# Patient Record
Sex: MALE | Race: WHITE | NOT HISPANIC OR LATINO | Employment: OTHER | URBAN - METROPOLITAN AREA
[De-identification: names, ages, dates, MRNs, and addresses within clinical notes are randomized per-mention and may not be internally consistent; named-entity substitution may affect disease eponyms.]

---

## 2022-04-25 ENCOUNTER — TELEPHONE (OUTPATIENT)
Dept: HEMATOLOGY ONCOLOGY | Facility: CLINIC | Age: 77
End: 2022-04-25

## 2022-04-25 ENCOUNTER — DOCUMENTATION (OUTPATIENT)
Dept: HEMATOLOGY ONCOLOGY | Facility: CLINIC | Age: 77
End: 2022-04-25

## 2022-04-25 ENCOUNTER — PATIENT OUTREACH (OUTPATIENT)
Dept: HEMATOLOGY ONCOLOGY | Facility: CLINIC | Age: 77
End: 2022-04-25

## 2022-04-25 NOTE — PROGRESS NOTES
Patient has an appointment with medical oncology on 6/24/22  Patient will be moving from [place] in June  I will make sure records are in chart for appointment

## 2022-04-25 NOTE — TELEPHONE ENCOUNTER
Reschedule Appointment     Who is calling in Spouse    Doctor Appointment Scheduled with Dr Carroll Farmer date and time 06/24 at 11:20am   New date and time 07/08 at 11:20am    Location [facility]   Patient verbalized understanding    yes

## 2022-04-25 NOTE — TELEPHONE ENCOUNTER
Soft Intake Form   Patient Details   Jesus Vaughn     1945     Reason For Appointment   Who is Calling? Spouse   If not patient, Name? Lalo Billingsley   DID YOU CONFIRM INSURANCE WITH PATIENT? Yes   Who is the Referring Doctor? Self Referral   What is the diagnosis? Clear Cell Renal Carcinoma   Has this diagnosis been confirmed by a biopsy/surgery? If yes, what is the date it was done? Yes, August 2018   Biopsy done at [place] [address]? If not, where was it done? [facility] , [address] [place] [address]   Was imaging done, and was it done at [facility]? If not, where was it done? Last Pet CT 2-   Have you been seen by another Oncologist?  If so, who and where (name of facility, city and state) Yes,  Archie Mckinley  [facility] , [address] [place] [address]   For 2nd Opinions Only: Are you currently undergoing treatment, or are you scheduled to start treatment? If yes, name of facility, city and state     For "History Of" only: Have you completed treatment? Have you had Genetic Testing done in the past?  If so, advise to bring test results to their visit No    Record Gathering Information   Did you advise to have records faxed to [phone]? Yes   Did you advise to have disks sent to the proper address with imaging? ("History of" Patients)  5 years of imaging for breast patients-Mammos, [place] etc Yes   Scheduling Information   Did you send new patient paperwork? Email or mail? Will arrive early to fill out paper work    What is the best call back number? (If the [facility] is calling, please use their number) [phone]   Miscellaneous Information    Patient is moving from [place] in June 2022   Patient is scheduled for 6-24-22 @ 11:20am with  Savita Muller

## 2022-04-25 NOTE — PROGRESS NOTES
Outreach to pt and wife and left voicemail  Introduced myself and Omid Ravi as their care coordination team   Advised that Lance Abel has already put in requests for pt's records in preparation for the appt in July  Assured them I would reach out closer to their appt time but if they need any assistance in the meantime, to please call  Received message from pt's wife, Parul Dozier, returning my call  Returned call to Parul Dozier and intorduced myself and Omid Ravi as pt's care coordination team   Explained that we are here to help with coordination of appts and care between teams, connection to resources, and provide assistance and support  They are following with pt's oncologist this week and have requested discs and records also  Pt currently receiving Opdivo and Xgeva treatment and doing well  Informed her I will call them again closer to the follow-up

## 2022-05-09 ENCOUNTER — TELEPHONE (OUTPATIENT)
Dept: HEMATOLOGY ONCOLOGY | Facility: CLINIC | Age: 77
End: 2022-05-09

## 2022-05-09 NOTE — TELEPHONE ENCOUNTER
Andrea Lezama from [address] [place] [phone] calling to inquire how she can send documentation for patient  There are over 200 pages  I gave her oncology email address

## 2022-05-10 ENCOUNTER — DOCUMENTATION (OUTPATIENT)
Dept: HEMATOLOGY ONCOLOGY | Facility: CLINIC | Age: 77
End: 2022-05-10

## 2022-05-10 NOTE — PROGRESS NOTES
Date slides requested: 5/10/22    Slides requested from: [facility]    Notified team: Yes      Date disk request sent: 5/10/22    Request sent to: [facility]    Notified team: Yes

## 2022-05-12 ENCOUNTER — DOCUMENTATION (OUTPATIENT)
Dept: HEMATOLOGY ONCOLOGY | Facility: CLINIC | Age: 77
End: 2022-05-12

## 2022-05-12 NOTE — TELEPHONE ENCOUNTER
Pathology received? 5/12/22    From? [employer] to? Alice/ Pam in Pathology       Via? Interoffice    Notified team/pool?  Yes

## 2022-05-12 NOTE — PROGRESS NOTES
Patient has med onc appt on 7/8/22  Pathology received? 5/12/22    From? [employer] to? Alice/ Pam in Pathology       Via? Interoffice    Notified team/pool?  Yes

## 2022-07-06 ENCOUNTER — TELEPHONE (OUTPATIENT)
Dept: HEMATOLOGY ONCOLOGY | Facility: CLINIC | Age: 77
End: 2022-07-06

## 2022-07-07 ENCOUNTER — PATIENT OUTREACH (OUTPATIENT)
Dept: HEMATOLOGY ONCOLOGY | Facility: CLINIC | Age: 77
End: 2022-07-07

## 2022-07-08 ENCOUNTER — TELEPHONE (OUTPATIENT)
Dept: HEMATOLOGY ONCOLOGY | Facility: CLINIC | Age: 77
End: 2022-07-08

## 2022-07-08 ENCOUNTER — CONSULT (OUTPATIENT)
Dept: HEMATOLOGY ONCOLOGY | Facility: CLINIC | Age: 77
End: 2022-07-08
Payer: MEDICARE

## 2022-07-08 VITALS
SYSTOLIC BLOOD PRESSURE: 140 MMHG | BODY MASS INDEX: 25.91 KG/M2 | HEART RATE: 69 BPM | TEMPERATURE: 96.9 F | OXYGEN SATURATION: 96 % | HEIGHT: 68 IN | DIASTOLIC BLOOD PRESSURE: 80 MMHG | WEIGHT: 171 LBS | RESPIRATION RATE: 16 BRPM

## 2022-07-08 DIAGNOSIS — C64.1 PRIMARY MALIGNANT NEOPLASM OF RIGHT KIDNEY WITH METASTASIS FROM KIDNEY TO OTHER SITE (HCC): Primary | ICD-10-CM

## 2022-07-08 DIAGNOSIS — C79.51 BONE METASTASES (HCC): ICD-10-CM

## 2022-07-08 PROCEDURE — 99205 OFFICE O/P NEW HI 60 MIN: CPT | Performed by: INTERNAL MEDICINE

## 2022-07-08 RX ORDER — ONDANSETRON 4 MG/1
4 TABLET, FILM COATED ORAL EVERY 8 HOURS PRN
COMMUNITY
End: 2022-07-29 | Stop reason: SDUPTHER

## 2022-07-08 RX ORDER — ONDANSETRON 4 MG/1
4 TABLET, ORALLY DISINTEGRATING ORAL EVERY 6 HOURS PRN
Qty: 30 TABLET | Refills: 1 | Status: SHIPPED | OUTPATIENT
Start: 2022-07-08 | End: 2022-08-03 | Stop reason: SDUPTHER

## 2022-07-08 RX ORDER — NIVOLUMAB 10 MG/ML
INJECTION INTRAVENOUS
COMMUNITY
End: 2022-07-08

## 2022-07-08 RX ORDER — PRAVASTATIN SODIUM 10 MG
10 TABLET ORAL DAILY
COMMUNITY

## 2022-07-08 RX ORDER — LISINOPRIL 20 MG/1
20 TABLET ORAL DAILY
COMMUNITY

## 2022-07-08 RX ORDER — OMEGA-3S/DHA/EPA/FISH OIL/D3 300MG-1000
400 CAPSULE ORAL DAILY
COMMUNITY

## 2022-07-08 RX ORDER — ASPIRIN 81 MG/1
81 TABLET, CHEWABLE ORAL DAILY
COMMUNITY

## 2022-07-08 RX ORDER — PHENOL 1.4 %
600 AEROSOL, SPRAY (ML) MUCOUS MEMBRANE 2 TIMES DAILY WITH MEALS
COMMUNITY

## 2022-07-08 RX ORDER — DENOSUMAB 120 MG/1.7ML
120 INJECTION SUBCUTANEOUS ONCE
COMMUNITY
End: 2022-08-15

## 2022-07-08 RX ORDER — ATENOLOL 25 MG/1
25 TABLET ORAL DAILY
COMMUNITY

## 2022-07-08 NOTE — PROGRESS NOTES
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    History of Presenting Illness:   The patient is 49-year-old  male with history of multiple skin cancers, history of melanoma when he was golfing and he had pain in the left thigh area, with subsequent limbing    Bone scan on 06/2018 showed abnormal increased uptake within the left femoral neck, inter trochanteric area and proximal day of feces, MRI showed abnormal marrow signal within the left femoral neck, neck, intertrochanteric region with extension to the proximal diaphoresis with mild bone expansion and endosteal scalloping, enlarged prostate measuring 4 8 x 3 6 cm fullness of the right kidney, right kidney biopsy showed high-grade renal cell carcinoma, clear cell subtype with possibility of sarcomatoid variant, positive for vimentin, CD10, comp 5 2, negative for PAX8, melan a, S100 and TTF1    Status post embolization to the tumor of the left hip in August 2018, and left total hip arthroplasty with pathological fracture, metastatic high-grade and sarcomatoid carcinoma consistent with grade 4 renal cell carcinoma, workup showed invading of the right adrenal gland, lung, bone involvement avid on PET scan    Status post 4 cycles of ipilimumab/nivolumab as of 11/01/2018 followed by maintenance nivolumab 480 mg every 4 weeks status post 15 fraction of radiation therapy to the left area    Regarding history of melanoma on his face requiring sentinel lymph node biopsy, history of 2 areas of melanoma in the back and the leg    He had been receiving Xgeva as of 11/07/2019 on monthly basis in [place] under Dr Frances Pepe    PET scan on 05/23/2022 showed necrotic left upper lobe lung mass measuring 2 5 x 2 4 cm with significant decrease from the initial PET scan with mild uptake nodule in the right lower lobe of the lung measuring 1 1 x 0 7 cm unchanged, nodule within the superior segment of the right lower lobe of the lung measuring 0 9 x 0 7 cm, mild uptake in the inferior pole of the right kidney measuring 1 7 x 1 6 cm, mild mesenteric, retroperitoneal, iliac, inguinal lymph node right adrenal nodule measuring 1 0 x 0 7 cm, left adrenal nodule measuring 2 2 x 1 6 cm and stable uptake in the left femoral head into the neck  He does not smoke or drink    He moved up to [place] to stay with his family    Denies any headache blurred vision diplopia odynophagia chest pain angina abdominal pain dysuria hematuria melena hematochezia he had mild left upper femur pain on and off does not wake him up at night denies any pruritus  Review of Systems - As stated in the HPI otherwise the fourteen point review of systems was negative  No past medical history on file  Social History     Socioeconomic History    Marital status: /Civil Union     Spouse name: None    Number of children: None    Years of education: None    Highest education level: None   Occupational History    None   Tobacco Use    Smoking status: Never Smoker    Smokeless tobacco: Never Used   Substance and Sexual Activity    Alcohol use: Never    Drug use: Never    Sexual activity: Not Currently   Other Topics Concern    None   Social History Narrative    None     Social Determinants of Health     Financial Resource Strain: Not on file   Food Insecurity: Not on file   Transportation Needs: Not on file   Physical Activity: Not on file   Stress: Not on file   Social Connections: Not on file   Intimate Partner Violence: Not on file   Housing Stability: Not on file       No family history on file      Allergies   Allergen Reactions    Latex Rash         Current Outpatient Medications:     aspirin 81 mg chewable tablet, Chew 81 mg daily, Disp: , Rfl:     atenolol (TENORMIN) 25 mg tablet, Take 25 mg by mouth daily, Disp: , Rfl:   calcium carbonate (OS-LITO) 600 MG tablet, Take 600 mg by mouth 2 (two) times a day with meals, Disp: , Rfl:     cholecalciferol (VITAMIN D3) 400 units tablet, Take 400 Units by mouth daily, Disp: , Rfl:     denosumab (Xgeva) 120 mg/1 7 mL, Inject 120 mg under the skin once, Disp: , Rfl:     lisinopril (ZESTRIL) 20 mg tablet, Take 20 mg by mouth daily, Disp: , Rfl:     ondansetron (Zofran ODT) 4 mg disintegrating tablet, Take 1 tablet (4 mg total) by mouth every 6 (six) hours as needed for nausea or vomiting, Disp: 30 tablet, Rfl: 1    ondansetron (ZOFRAN) 4 mg tablet, Take 4 mg by mouth every 8 (eight) hours as needed for nausea or vomiting, Disp: , Rfl:     pravastatin (PRAVACHOL) 10 mg tablet, Take 10 mg by mouth daily, Disp: , Rfl:       /80 (BP Location: Right arm, Patient Position: Sitting, Cuff Size: Adult)   Pulse 69   Temp (!) 96 9 °F (36 1 °C) (Temporal)   Resp 16   Ht 5' 8" (1 727 m)   Wt 77 6 kg (171 lb)   SpO2 96%   BMI 26 00 kg/m²       General Appearance:    Alert, oriented        Eyes:    PERRL   Ears:    Normal external ear canals, both ears   Nose:   Nares normal, septum midline   Throat:   Mucosa moist  Pharynx without injection  Neck:   Supple       Lungs:     Clear to auscultation bilaterally   Chest Wall:    No tenderness or deformity    Heart:    Regular rate and rhythm       Abdomen:     Soft, non-tender, bowel sounds +, no organomegaly           Extremities:   Extremities no cyanosis or edema       Skin:   no rash or icterus  Lymph nodes:   Cervical, supraclavicular, and axillary nodes normal   Neurologic:   CNII-XII intact, normal strength, sensation and reflexes     Throughout               No results found for this or any previous visit (from the past 48 hour(s))  No results found    ECOG :1      Assessment and plan:    67-year-old  male with 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 with multiple retroperitoneal, pelvic lymphadenopathy, bilateral adrenal gland involvement, bilateral lung involvement and left femur head involvement status post ORIF followed by radiation therapy he received 4 cycles of ipilimumab/nivolumab as of 11/01/2018 at [address] in [facility]    He had good response with subsequent continuation of nivolumab 480 mg every month    The last PET scan on 05/23/2022 showed favorable response involving the lungs, retroperitoneal, bilateral adrenal glands primary in the right kidney    Tolerating therapy very well, will continue with nivolumab 480 mg flat dose every month    Xgeva initially on monthly basis and later on space out the dose to every 3 months    CT scan of the chest abdomen and pelvis without IV or oral contrast in September 2022

## 2022-07-08 NOTE — TELEPHONE ENCOUNTER
Left detailed message for patient regarding updated infusion schedule  Advised patient to call back if he has any questions  I will call patient again later today

## 2022-07-08 NOTE — TELEPHONE ENCOUNTER
While we try to accommodate patient requests, our priority is to schedule treatment according to Doctor's orders and site availability  1  Does the Provider use the intake sheet or checkout note? note  2  What would be a preferred day of the week that would work best for your infusion appointment? Any day  3  Do you prefer mornings or afternoons for your appointments? AM  4  Are there any days or dates that do not work for your schedule, including any upcoming vacations? n/a  5  We are going to try our best to schedule you at the infusion center closest to your home  In the event that we are unable to what would be your next preferred infusion site or sites? 1  [facility]  2    3      6  Do you have transportation to take you to all of your appointments? yes  7   Would you like the infusion center to draw labs from your port? (disregard if patient doesn't have a port or need labs for infusion appointment) n/a

## 2022-07-11 NOTE — TELEPHONE ENCOUNTER
Spoke with patient to go over updated treatment schedule  Patient has questions as to whether he's supposed to be continuing with Xgeva  Please reach out to patient regarding his concern  Thank you!

## 2022-07-11 NOTE — TELEPHONE ENCOUNTER
Clarified with Dr Davian Gaxiola pt to continue [facility]  Alan-michelle will eventually be spaced out to every 3 months  Pt verbalized understanding

## 2022-07-11 NOTE — TELEPHONE ENCOUNTER
Pt questioning if AVS should have said cancel Xgeva instead of cancel Opdivo  Will clarify with Dr Ozzie Cabrera

## 2022-07-22 DIAGNOSIS — C64.1 PRIMARY MALIGNANT NEOPLASM OF RIGHT KIDNEY WITH METASTASIS FROM KIDNEY TO OTHER SITE (HCC): Primary | ICD-10-CM

## 2022-07-22 RX ORDER — SODIUM CHLORIDE 9 MG/ML
20 INJECTION, SOLUTION INTRAVENOUS ONCE
Status: CANCELLED | OUTPATIENT
Start: 2022-08-08

## 2022-07-25 ENCOUNTER — APPOINTMENT (EMERGENCY)
Dept: CT IMAGING | Facility: HOSPITAL | Age: 77
End: 2022-07-25
Payer: MEDICARE

## 2022-07-25 ENCOUNTER — TELEPHONE (OUTPATIENT)
Dept: HEMATOLOGY ONCOLOGY | Facility: CLINIC | Age: 77
End: 2022-07-25

## 2022-07-25 ENCOUNTER — HOSPITAL ENCOUNTER (EMERGENCY)
Facility: HOSPITAL | Age: 77
Discharge: HOME | End: 2022-07-25
Attending: EMERGENCY MEDICINE | Admitting: EMERGENCY MEDICINE
Payer: MEDICARE

## 2022-07-25 VITALS
RESPIRATION RATE: 18 BRPM | HEART RATE: 102 BPM | DIASTOLIC BLOOD PRESSURE: 80 MMHG | SYSTOLIC BLOOD PRESSURE: 152 MMHG | TEMPERATURE: 97.7 F | OXYGEN SATURATION: 98 %

## 2022-07-25 DIAGNOSIS — C64.1 PRIMARY MALIGNANT NEOPLASM OF RIGHT KIDNEY WITH METASTASIS FROM KIDNEY TO OTHER SITE (HCC): ICD-10-CM

## 2022-07-25 DIAGNOSIS — R19.7 DIARRHEA OF PRESUMED INFECTIOUS ORIGIN: Primary | ICD-10-CM

## 2022-07-25 DIAGNOSIS — C79.51 BONE METASTASES: Primary | ICD-10-CM

## 2022-07-25 LAB
ALBUMIN SERPL BCP-MCNC: 3.7 G/DL (ref 3.5–5)
ALP SERPL-CCNC: 70 U/L (ref 34–104)
ALT SERPL W P-5'-P-CCNC: 21 U/L (ref 7–52)
ANION GAP SERPL CALCULATED.3IONS-SCNC: 8 MMOL/L (ref 4–13)
AST SERPL W P-5'-P-CCNC: 17 U/L (ref 13–39)
BASOPHILS # BLD AUTO: 0.06 THOUSANDS/ΜL (ref 0–0.1)
BASOPHILS NFR BLD AUTO: 1 % (ref 0–1)
BILIRUB DIRECT SERPL-MCNC: 0.49 MG/DL (ref 0–0.2)
BILIRUB SERPL-MCNC: 1.11 MG/DL (ref 0.2–1)
BUN SERPL-MCNC: 10 MG/DL (ref 5–25)
CALCIUM SERPL-MCNC: 8.1 MG/DL (ref 8.4–10.2)
CHLORIDE SERPL-SCNC: 106 MMOL/L (ref 96–108)
CO2 SERPL-SCNC: 23 MMOL/L (ref 21–32)
CREAT SERPL-MCNC: 0.94 MG/DL (ref 0.6–1.3)
EOSINOPHIL # BLD AUTO: 0.06 THOUSAND/ΜL (ref 0–0.61)
EOSINOPHIL NFR BLD AUTO: 1 % (ref 0–6)
ERYTHROCYTE [DISTWIDTH] IN BLOOD BY AUTOMATED COUNT: 13.2 % (ref 11.6–15.1)
GFR SERPL CREATININE-BSD FRML MDRD: 76 ML/MIN/1.73SQ M
GLUCOSE SERPL-MCNC: 103 MG/DL (ref 65–140)
HCT VFR BLD AUTO: 43.8 % (ref 36.5–49.3)
HGB BLD-MCNC: 14.8 G/DL (ref 12–17)
IMM GRANULOCYTES # BLD AUTO: 0.08 THOUSAND/UL (ref 0–0.2)
IMM GRANULOCYTES NFR BLD AUTO: 1 % (ref 0–2)
LIPASE SERPL-CCNC: 189 U/L (ref 11–82)
LYMPHOCYTES # BLD AUTO: 1.02 THOUSANDS/ΜL (ref 0.6–4.47)
LYMPHOCYTES NFR BLD AUTO: 12 % (ref 14–44)
MCH RBC QN AUTO: 32.3 PG (ref 26.8–34.3)
MCHC RBC AUTO-ENTMCNC: 33.8 G/DL (ref 31.4–37.4)
MCV RBC AUTO: 96 FL (ref 82–98)
MONOCYTES # BLD AUTO: 0.98 THOUSAND/ΜL (ref 0.17–1.22)
MONOCYTES NFR BLD AUTO: 11 % (ref 4–12)
NEUTROPHILS # BLD AUTO: 6.49 THOUSANDS/ΜL (ref 1.85–7.62)
NEUTS SEG NFR BLD AUTO: 74 % (ref 43–75)
NRBC BLD AUTO-RTO: 0 /100 WBCS
PLATELET # BLD AUTO: 397 THOUSANDS/UL (ref 149–390)
PMV BLD AUTO: 8.6 FL (ref 8.9–12.7)
POTASSIUM SERPL-SCNC: 4.7 MMOL/L (ref 3.5–5.3)
PROT SERPL-MCNC: 6.9 G/DL (ref 6.4–8.4)
RBC # BLD AUTO: 4.58 MILLION/UL (ref 3.88–5.62)
SODIUM SERPL-SCNC: 137 MMOL/L (ref 135–147)
WBC # BLD AUTO: 8.69 THOUSAND/UL (ref 4.31–10.16)

## 2022-07-25 PROCEDURE — G1004 CDSM NDSC: HCPCS

## 2022-07-25 PROCEDURE — 99284 EMERGENCY DEPT VISIT MOD MDM: CPT | Performed by: EMERGENCY MEDICINE

## 2022-07-25 PROCEDURE — 96360 HYDRATION IV INFUSION INIT: CPT

## 2022-07-25 PROCEDURE — 83690 ASSAY OF LIPASE: CPT | Performed by: EMERGENCY MEDICINE

## 2022-07-25 PROCEDURE — 36415 COLL VENOUS BLD VENIPUNCTURE: CPT

## 2022-07-25 PROCEDURE — 80053 COMPREHEN METABOLIC PANEL: CPT | Performed by: EMERGENCY MEDICINE

## 2022-07-25 PROCEDURE — 99284 EMERGENCY DEPT VISIT MOD MDM: CPT

## 2022-07-25 PROCEDURE — 82248 BILIRUBIN DIRECT: CPT

## 2022-07-25 PROCEDURE — 85025 COMPLETE CBC W/AUTO DIFF WBC: CPT | Performed by: EMERGENCY MEDICINE

## 2022-07-25 PROCEDURE — 96361 HYDRATE IV INFUSION ADD-ON: CPT

## 2022-07-25 PROCEDURE — 74176 CT ABD & PELVIS W/O CONTRAST: CPT

## 2022-07-25 RX ORDER — METRONIDAZOLE 500 MG/1
500 TABLET ORAL EVERY 8 HOURS SCHEDULED
Qty: 30 TABLET | Refills: 0 | Status: SHIPPED | OUTPATIENT
Start: 2022-07-25 | End: 2022-08-02

## 2022-07-25 RX ADMIN — SODIUM CHLORIDE 1000 ML: 0.9 INJECTION, SOLUTION INTRAVENOUS at 13:45

## 2022-07-25 NOTE — DISCHARGE INSTRUCTIONS
Please take the antibiotic as prescribed  Avoid alcohol with this medication  Probiotics are recommended  Follow up with your PCP to have a stool sample done for C  Diff infection  Please return to the ER if you experience any of the following:    Worsening diarrhea associated with nausea/vomiting, profound dizziness/fainting, fever/chills, severe abdominal pain

## 2022-07-25 NOTE — ED PROVIDER NOTES
History  Chief Complaint   Patient presents with    Abdominal Pain     Pt states started with abdominal pain and diarrhea x3 weeks just moved from [place] a month ago only has Cancer Dr  No PCP yet      69yo M w/ h/o renal CA (on chemo q1mo, last chemo 2+mo ago) presenting for chronic diarrhea  3wks ago after moving from [place], Pt began having watery diarrhea with scant yellow-tinged stool that floats, and is malodorous  It occurs several times a day, especially worse at night, not provoked by anything, however, eating does bring it on  Has associated nausea, but it able to keep food down  No fever/chills, abdominal pain, bloody stool/urine, changes in voiding, SOB, CP, back pain, dizziness, vomiting, h/o abdominal surgeries, h/o gallstones, significant EtOH use  History provided by:  Spouse and patient  Abdominal Pain  Associated symptoms: diarrhea (malodorous; floats) and nausea    Associated symptoms: no chest pain, no chills, no constipation, no cough, no fatigue, no fever, no shortness of breath and no vomiting        Prior to Admission Medications   Prescriptions Last Dose Informant Patient Reported?  Taking?   aspirin 81 mg chewable tablet   Yes No   Sig: Chew 81 mg daily   atenolol (TENORMIN) 25 mg tablet   Yes No   Sig: Take 25 mg by mouth daily   calcium carbonate (OS-LITO) 600 MG tablet   Yes No   Sig: Take 600 mg by mouth 2 (two) times a day with meals   cholecalciferol (VITAMIN D3) 400 units tablet   Yes No   Sig: Take 400 Units by mouth daily   denosumab (Xgeva) 120 mg/1 7 mL   Yes No   Sig: Inject 120 mg under the skin once   lisinopril (ZESTRIL) 20 mg tablet  Self Yes No   Sig: Take 20 mg by mouth daily   ondansetron (ZOFRAN) 4 mg tablet   Yes No   Sig: Take 4 mg by mouth every 8 (eight) hours as needed for nausea or vomiting   ondansetron (Zofran ODT) 4 mg disintegrating tablet   No No   Sig: Take 1 tablet (4 mg total) by mouth every 6 (six) hours as needed for nausea or vomiting   pravastatin (PRAVACHOL) 10 mg tablet   Yes No   Sig: Take 10 mg by mouth daily      Facility-Administered Medications: None       Past Medical History:   Diagnosis Date    Hypertension     Kidney cancer, primary, 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 [facility])     MI (myocardial infarction) ([facility] [address] )        No past surgical history on file  No family history on file  I have reviewed and agree with the history as documented  E-Cigarette/Vaping     E-Cigarette/Vaping Substances     Social History     Tobacco Use    Smoking status: Never Smoker    Smokeless tobacco: Never Used   Substance Use Topics    Alcohol use: Never    Drug use: Never        Review of Systems   Constitutional: Positive for appetite change  Negative for activity change, chills, fatigue and fever  HENT: Negative  Eyes: Negative  Respiratory: Negative for apnea, cough, chest tightness and shortness of breath  Cardiovascular: Positive for leg swelling (Baseline)  Negative for chest pain and palpitations  Gastrointestinal: Positive for abdominal pain, diarrhea (malodorous; floats) and nausea  Negative for abdominal distention, blood in stool, constipation, rectal pain and vomiting  Genitourinary: Negative  Musculoskeletal: Negative  Skin: Negative  Allergic/Immunologic: Negative  Neurological: Negative for dizziness, syncope, weakness, light-headedness, numbness and headaches  Psychiatric/Behavioral: Negative for agitation and behavioral problems         Physical Exam  ED Triage Vitals   Temperature Pulse Respirations Blood Pressure SpO2   07/25/22 1146 07/25/22 1146 07/25/22 1146 07/25/22 1146 07/25/22 1146   97 7 °F (36 5 °C) (!) 110 18 140/79 96 %      Temp Source Heart Rate Source Patient Position - Orthostatic VS BP Location FiO2 (%)   07/25/22 1146 07/25/22 1146 07/25/22 1613 07/25/22 1146 --   Oral Monitor Sitting Left arm       Pain Score       07/25/22 1613       No Pain             Orthostatic Vital Signs  Vitals: 07/25/22 1146 07/25/22 1613   BP: 140/79 152/80   Pulse: (!) 110 102   Patient Position - Orthostatic VS:  Sitting       Physical Exam  Constitutional:       General: He is not in acute distress  Appearance: Normal appearance  He is not ill-appearing  HENT:      Head: Normocephalic and atraumatic  Right Ear: External ear normal       Left Ear: External ear normal       Nose: Nose normal  No rhinorrhea  Eyes:      General: No scleral icterus  Conjunctiva/sclera: Conjunctivae normal    Cardiovascular:      Rate and Rhythm: Normal rate and regular rhythm  Pulses: Normal pulses  Heart sounds: Normal heart sounds  Pulmonary:      Effort: Pulmonary effort is normal  No respiratory distress  Breath sounds: Normal breath sounds  Chest:      Chest wall: No tenderness  Abdominal:      General: Abdomen is flat  Bowel sounds are normal  There is distension  Tenderness: There is no abdominal tenderness  There is no right CVA tenderness or left CVA tenderness  Hernia: No hernia is present  Musculoskeletal:      Right lower leg: Edema present  Left lower leg: Edema present  Skin:     General: Skin is warm and dry  Capillary Refill: Capillary refill takes less than 2 seconds  Comments: Poor skin turgor   Neurological:      General: No focal deficit present  Mental Status: He is alert and oriented to person, place, and time  Mental status is at baseline     Psychiatric:         Mood and Affect: Mood normal          Behavior: Behavior normal          ED Medications  Medications   sodium chloride 0 9 % bolus 1,000 mL (0 mL Intravenous Stopped 7/25/22 1519)       Diagnostic Studies  Results Reviewed     Procedure Component Value Units Date/Time    Bilirubin, direct [[id number]]  (Abnormal) Collected: 07/25/22 1157    Lab Status: Final result Specimen: Blood from Arm, Left Updated: 07/25/22 1510     Bilirubin, Direct 0 49 mg/dL     Clostridium difficile toxin by PCR with EIA [[id number]]     Lab Status: No result Specimen: Stool     Stool Enteric Bacterial Panel by PCR [[id number]]     Lab Status: No result Specimen: Stool     Comprehensive metabolic panel [[id number]]  (Abnormal) Collected: 07/25/22 1157    Lab Status: Final result Specimen: Blood from Arm, Left Updated: 07/25/22 1232     Sodium 137 mmol/L      Potassium 4 7 mmol/L      Chloride 106 mmol/L      CO2 23 mmol/L      ANION GAP 8 mmol/L      BUN 10 mg/dL      Creatinine 0 94 mg/dL      Glucose 103 mg/dL      Calcium 8 1 mg/dL      AST 17 U/L      ALT 21 U/L      Alkaline Phosphatase 70 U/L      Total Protein 6 9 g/dL      Albumin 3 7 g/dL      Total Bilirubin 1 11 mg/dL      eGFR 76 ml/min/1 73sq m     Narrative:      National Kidney Disease Foundation guidelines for Chronic Kidney Disease (CKD):     Stage 1 with normal or high GFR (GFR > 90 mL/min/1 73 square meters)    Stage 2 Mild CKD (GFR = 60-89 mL/min/1 73 square meters)    Stage 3A Moderate CKD (GFR = 45-59 mL/min/1 73 square meters)    Stage 3B Moderate CKD (GFR = 30-44 mL/min/1 73 square meters)    Stage 4 Severe CKD (GFR = 15-29 mL/min/1 73 square meters)    Stage 5 End Stage CKD (GFR <15 mL/min/1 73 square meters)  Note: GFR calculation is accurate only with a steady state creatinine    Lipase [[id number]]  (Abnormal) Collected: 07/25/22 1157    Lab Status: Final result Specimen: Blood from Arm, Left Updated: 07/25/22 1232     Lipase 189 u/L     CBC and differential [[id number]]  (Abnormal) Collected: 07/25/22 1157    Lab Status: Final result Specimen: Blood from Arm, Left Updated: 07/25/22 1203     WBC 8 69 Thousand/uL      RBC 4 58 Million/uL      Hemoglobin 14 8 g/dL      Hematocrit 43 8 %      MCV 96 fL      MCH 32 3 pg      MCHC 33 8 g/dL      RDW 13 2 %      MPV 8 6 fL      Platelets 104 Thousands/uL      nRBC 0 /100 WBCs      Neutrophils Relative 74 %      Immat GRANS % 1 %      Lymphocytes Relative 12 %      Monocytes Relative 11 % Eosinophils Relative 1 %      Basophils Relative 1 %      Neutrophils Absolute 6 49 Thousands/µL      Immature Grans Absolute 0 08 Thousand/uL      Lymphocytes Absolute 1 02 Thousands/µL      Monocytes Absolute 0 98 Thousand/µL      Eosinophils Absolute 0 06 Thousand/µL      Basophils Absolute 0 06 Thousands/µL                  CT abdomen pelvis wo contrast   Final Result by Nathan Aguilar MD (07/25 1605)   No peripancreatic fluid collection seen   No biliary dilation seen   Fluid-filled bowel loops throughout the abdomen without bowel obstruction               Workstation performed: [id number]               Procedures  Procedures      ED Course                                       MDM  Number of Diagnoses or Management Options  Diarrhea of presumed infectious origin  Diagnosis management comments: Pt on chemotherapy presenting for chronic diarrhea  CT abdomen negative for pancreatitis  Lipase <3x ULN  Likely 2/2 C  Diff infection  Pt improved since presenting to the ED and was unable to provide a stool sample  Advised Pt to f/u with PCP to provide a sample  Empirically treating for C  Diff (chronic diarrhea + malodorous stool + waxing/waning diarrhea pattern + on chemo)  Disposition  Final diagnoses:   Diarrhea of presumed infectious origin     Time reflects when diagnosis was documented in both MDM as applicable and the Disposition within this note     Time User Action Codes Description Comment    7/25/2022  4:36 PM Chelsey Cameron Add [R19 [id number]] Diarrhea of presumed infectious origin       ED Disposition     ED Disposition   Discharge    Condition   Stable    Date/Time   Mon Jul 25, 2022  4:38 PM    Comment   Isael Walker discharge to home/self care                 Follow-up Information     Follow up With Specialties Details Why Contact Info Additional [address] Emergency Department Emergency Medicine Go to  If symptoms worsen [address] [facility] [address] [facility] Emergency Department, [address], [place], [place], [address]          Discharge Medication List as of 7/25/2022  4:39 PM      START taking these medications    Details   metroNIDAZOLE (FLAGYL) 500 mg tablet Take 1 tablet (500 mg total) by mouth every 8 (eight) hours for 10 days, Starting Mon 7/25/2022, Until Thu 8/4/2022, Print         CONTINUE these medications which have NOT CHANGED    Details   aspirin 81 mg chewable tablet Chew 81 mg daily, Historical Med      atenolol (TENORMIN) 25 mg tablet Take 25 mg by mouth daily, Historical Med      calcium carbonate (OS-LITO) 600 MG tablet Take 600 mg by mouth 2 (two) times a day with meals, Historical Med      cholecalciferol (VITAMIN D3) 400 units tablet Take 400 Units by mouth daily, Historical Med      denosumab (Xgeva) 120 mg/1 7 mL Inject 120 mg under the skin once, Historical Med      lisinopril (ZESTRIL) 20 mg tablet Take 20 mg by mouth daily, Historical Med      ondansetron (Zofran ODT) 4 mg disintegrating tablet Take 1 tablet (4 mg total) by mouth every 6 (six) hours as needed for nausea or vomiting, Starting Fri 7/8/2022, Normal      ondansetron (ZOFRAN) 4 mg tablet Take 4 mg by mouth every 8 (eight) hours as needed for nausea or vomiting, Historical Med      pravastatin (PRAVACHOL) 10 mg tablet Take 10 mg by mouth daily, Historical Med           No discharge procedures on file  PDMP Review     None           ED Provider  Attending physically available and evaluated Deon Mace I managed the patient along with the ED Attending      Electronically Signed by         Alexa Delatorre MD  07/25/22 1800

## 2022-07-25 NOTE — TELEPHONE ENCOUNTER
Spoke with wife who reports pt has had nausea since 7/8 and diarrhea started not long after  He has been taking Zofran and Imodium without much relief  Today had 3-4 episodes of diarrhea already and was going all night long  Last Nivolumab was 5/26 in [place]  Discussed symptoms are likely not related since it's been nearly two months at this point  Pt will go to ED for evaluation

## 2022-07-25 NOTE — TELEPHONE ENCOUNTER
CALL RETURN FORM   Reason for patient call? patient's wife Norris Steele is calling in regards to her  having loose stool and not feeling well over the last week   Patient's primary oncologist? Dr Roxane Chapa   Name of person the patient was calling for? Val   Any additional information to add, if applicable? Best call back number is [phone]   Informed patient that the message will be forwarded to the team and someone will get back to them as soon as possible    Did you relay this information to the patient?  Yes

## 2022-07-26 ENCOUNTER — TELEPHONE (OUTPATIENT)
Dept: HEMATOLOGY ONCOLOGY | Facility: CLINIC | Age: 77
End: 2022-07-26

## 2022-07-26 NOTE — TELEPHONE ENCOUNTER
Pt scheduled to start on 8/8  Cycle 2 falls on the holiday (9/5)  I scheduled him on 9/6 for this  Once dates are changed accordingly we can schedule the rest of his cycles  Thanks!     Gisselle Burr

## 2022-07-26 NOTE — TELEPHONE ENCOUNTER
Pt would like to defer treatment one week due to recent ED visit for diarrhea  Currently scheduled for 7/29 at [facility]  Please reschedule and call him with new appts  Thank you      1  Does the Provider use the intake sheet or checkout note? note  2  What would be a preferred day of the week that would work best for your infusion appointment? Any day  3  Do you prefer mornings or afternoons for your appointments? AM  4  Are there any days or dates that do not work for your schedule, including any upcoming vacations? n/a  5  We are going to try our best to schedule you at the infusion center closest to your home  In the event that we are unable to what would be your next preferred infusion site or sites?      1  [facility]  2      3         6  Do you have transportation to take you to all of your appointments? yes  7   Would you like the infusion center to draw labs from your port? (disregard if patient doesn't have a port or need labs for infusion appointment) n/a

## 2022-07-26 NOTE — TELEPHONE ENCOUNTER
[facility] does not have availability next week  Can schedule pt on 8/8  Please advise how to proceed

## 2022-07-26 NOTE — ED ATTENDING ATTESTATION
7/25/2022  INate DO, saw and evaluated the patient  I have discussed the patient with the resident/non-physician practitioner and agree with the resident's/non-physician practitioner's findings, Plan of Care, and MDM as documented in the resident's/non-physician practitioner's note, except where noted  All available labs and Radiology studies were reviewed  I was present for key portions of any procedure(s) performed by the resident/non-physician practitioner and I was immediately available to provide assistance  At this point I agree with the current assessment done in the Emergency Department  I have conducted an independent evaluation of this patient a history and physical is as follows:    75 yo M coming in w/ a 2+ week history of diarrhea, multiple episodes daily, watery, malodorous  No fevers/chills  On physical exam: pt very well appearing, in NAD  mucous membranes dry  CTA b/l , heart RRR  Abdomen NT, ND, no R/R/G  Neuro intact, gcs 15  Cap refill < 2 sec, skin warm and dry  Poor skin turgor  ED Course     Labs indicate mild dehydration  CT abd/pelvis shows liquid stool, no signs of severe colonic inflammation  Stool studies ordered, however could not provide a sample  Treat for empiric C  Diff given duration of diarrhea  Also may be side effect from chemotherapy, but hasn't had this before  Stable for d/c home, RTER precautions      Critical Care Time  Procedures

## 2022-07-26 NOTE — TELEPHONE ENCOUNTER
Spoke with pt's wife who states he had 8 episodes of diarrhea last night  She is going to pickup the Flagyl this morning  Reviewed the CT scan with her and let her know it did mention diverticulosis but no pancreatitis  Will ask Mike Ragland if pt okay to be treated this week  Wife also asking if pt still needs CT scan

## 2022-07-27 NOTE — TELEPHONE ENCOUNTER
Spoke with patient and wife to go over updated infusion schedule  They are aware and okay with all dates and times  [facility], dates for [facility] are updated

## 2022-07-27 NOTE — TELEPHONE ENCOUNTER
Patient has been scheduled out  Date was only changed for Cycle 1  Please have the rest of the dates changed  Thank you

## 2022-07-29 ENCOUNTER — HOSPITAL ENCOUNTER (INPATIENT)
Facility: HOSPITAL | Age: 77
LOS: 4 days | Discharge: HOME | DRG: 394 | End: 2022-08-02
Attending: EMERGENCY MEDICINE | Admitting: INTERNAL MEDICINE
Payer: MEDICARE

## 2022-07-29 DIAGNOSIS — R11.2 NAUSEA VOMITING AND DIARRHEA: ICD-10-CM

## 2022-07-29 DIAGNOSIS — E86.0 DEHYDRATION: ICD-10-CM

## 2022-07-29 DIAGNOSIS — K29.70 GASTRITIS: ICD-10-CM

## 2022-07-29 DIAGNOSIS — R19.7 NAUSEA VOMITING AND DIARRHEA: ICD-10-CM

## 2022-07-29 DIAGNOSIS — R19.7 DIARRHEA, UNSPECIFIED TYPE: ICD-10-CM

## 2022-07-29 DIAGNOSIS — N17.9 AKI (ACUTE KIDNEY INJURY) (HCC): Primary | ICD-10-CM

## 2022-07-29 DIAGNOSIS — R19.7 DIARRHEA OF PRESUMED INFECTIOUS ORIGIN: ICD-10-CM

## 2022-07-29 DIAGNOSIS — C64.1 PRIMARY MALIGNANT NEOPLASM OF RIGHT KIDNEY WITH METASTASIS FROM KIDNEY TO OTHER SITE (HCC): ICD-10-CM

## 2022-07-29 DIAGNOSIS — R11.2 NAUSEA AND VOMITING, UNSPECIFIED VOMITING TYPE: ICD-10-CM

## 2022-07-29 PROBLEM — I10 HYPERTENSION: Status: ACTIVE | Noted: 2022-07-29

## 2022-07-29 LAB
2HR DELTA HS TROPONIN: 2 NG/L
4HR DELTA HS TROPONIN: 2 NG/L
ALBUMIN SERPL BCP-MCNC: 3.8 G/DL (ref 3.5–5)
ALP SERPL-CCNC: 73 U/L (ref 34–104)
ALT SERPL W P-5'-P-CCNC: 36 U/L (ref 7–52)
ANION GAP SERPL CALCULATED.3IONS-SCNC: 10 MMOL/L (ref 4–13)
AST SERPL W P-5'-P-CCNC: 32 U/L (ref 13–39)
BASOPHILS # BLD AUTO: 0.04 THOUSANDS/ΜL (ref 0–0.1)
BASOPHILS NFR BLD AUTO: 0 % (ref 0–1)
BILIRUB SERPL-MCNC: 1.32 MG/DL (ref 0.2–1)
BUN SERPL-MCNC: 22 MG/DL (ref 5–25)
CALCIUM SERPL-MCNC: 8.1 MG/DL (ref 8.4–10.2)
CARDIAC TROPONIN I PNL SERPL HS: 13 NG/L
CARDIAC TROPONIN I PNL SERPL HS: 15 NG/L
CARDIAC TROPONIN I PNL SERPL HS: 15 NG/L
CHLORIDE SERPL-SCNC: 111 MMOL/L (ref 96–108)
CO2 SERPL-SCNC: 16 MMOL/L (ref 21–32)
CREAT SERPL-MCNC: 1.74 MG/DL (ref 0.6–1.3)
EOSINOPHIL # BLD AUTO: 0.02 THOUSAND/ΜL (ref 0–0.61)
EOSINOPHIL NFR BLD AUTO: 0 % (ref 0–6)
ERYTHROCYTE [DISTWIDTH] IN BLOOD BY AUTOMATED COUNT: 13.4 % (ref 11.6–15.1)
FLUAV RNA RESP QL NAA+PROBE: NEGATIVE
FLUBV RNA RESP QL NAA+PROBE: NEGATIVE
GFR SERPL CREATININE-BSD FRML MDRD: 36 ML/MIN/1.73SQ M
GLUCOSE SERPL-MCNC: 133 MG/DL (ref 65–140)
HCT VFR BLD AUTO: 46 % (ref 36.5–49.3)
HGB BLD-MCNC: 15.5 G/DL (ref 12–17)
IMM GRANULOCYTES # BLD AUTO: 0.06 THOUSAND/UL (ref 0–0.2)
IMM GRANULOCYTES NFR BLD AUTO: 1 % (ref 0–2)
LYMPHOCYTES # BLD AUTO: 1.1 THOUSANDS/ΜL (ref 0.6–4.47)
LYMPHOCYTES NFR BLD AUTO: 12 % (ref 14–44)
MAGNESIUM SERPL-MCNC: 2.1 MG/DL (ref 1.9–2.7)
MCH RBC QN AUTO: 31.7 PG (ref 26.8–34.3)
MCHC RBC AUTO-ENTMCNC: 33.7 G/DL (ref 31.4–37.4)
MCV RBC AUTO: 94 FL (ref 82–98)
MONOCYTES # BLD AUTO: 0.86 THOUSAND/ΜL (ref 0.17–1.22)
MONOCYTES NFR BLD AUTO: 9 % (ref 4–12)
NEUTROPHILS # BLD AUTO: 7.37 THOUSANDS/ΜL (ref 1.85–7.62)
NEUTS SEG NFR BLD AUTO: 78 % (ref 43–75)
NRBC BLD AUTO-RTO: 0 /100 WBCS
PLATELET # BLD AUTO: 442 THOUSANDS/UL (ref 149–390)
PMV BLD AUTO: 8.3 FL (ref 8.9–12.7)
POTASSIUM SERPL-SCNC: 3.7 MMOL/L (ref 3.5–5.3)
PROCALCITONIN SERPL-MCNC: 0.38 NG/ML
PROT SERPL-MCNC: 6.9 G/DL (ref 6.4–8.4)
RBC # BLD AUTO: 4.89 MILLION/UL (ref 3.88–5.62)
RSV RNA RESP QL NAA+PROBE: NEGATIVE
SARS-COV-2 RNA RESP QL NAA+PROBE: NEGATIVE
SODIUM SERPL-SCNC: 137 MMOL/L (ref 135–147)
WBC # BLD AUTO: 9.45 THOUSAND/UL (ref 4.31–10.16)

## 2022-07-29 PROCEDURE — 87505 NFCT AGENT DETECTION GI: CPT | Performed by: EMERGENCY MEDICINE

## 2022-07-29 PROCEDURE — 99285 EMERGENCY DEPT VISIT HI MDM: CPT | Performed by: EMERGENCY MEDICINE

## 2022-07-29 PROCEDURE — 80053 COMPREHEN METABOLIC PANEL: CPT | Performed by: EMERGENCY MEDICINE

## 2022-07-29 PROCEDURE — 84484 ASSAY OF TROPONIN QUANT: CPT | Performed by: EMERGENCY MEDICINE

## 2022-07-29 PROCEDURE — 0241U HB NFCT DS VIR RESP RNA 4 TRGT: CPT | Performed by: NURSE PRACTITIONER

## 2022-07-29 PROCEDURE — 83735 ASSAY OF MAGNESIUM: CPT | Performed by: EMERGENCY MEDICINE

## 2022-07-29 PROCEDURE — 96374 THER/PROPH/DIAG INJ IV PUSH: CPT

## 2022-07-29 PROCEDURE — 99223 1ST HOSP IP/OBS HIGH 75: CPT | Performed by: NURSE PRACTITIONER

## 2022-07-29 PROCEDURE — 96361 HYDRATE IV INFUSION ADD-ON: CPT

## 2022-07-29 PROCEDURE — 93005 ELECTROCARDIOGRAM TRACING: CPT

## 2022-07-29 PROCEDURE — 36415 COLL VENOUS BLD VENIPUNCTURE: CPT | Performed by: EMERGENCY MEDICINE

## 2022-07-29 PROCEDURE — 99284 EMERGENCY DEPT VISIT MOD MDM: CPT

## 2022-07-29 PROCEDURE — 87493 C DIFF AMPLIFIED PROBE: CPT | Performed by: EMERGENCY MEDICINE

## 2022-07-29 PROCEDURE — 84145 PROCALCITONIN (PCT): CPT | Performed by: NURSE PRACTITIONER

## 2022-07-29 PROCEDURE — 85025 COMPLETE CBC W/AUTO DIFF WBC: CPT | Performed by: EMERGENCY MEDICINE

## 2022-07-29 RX ORDER — ACETAMINOPHEN 325 MG/1
650 TABLET ORAL EVERY 6 HOURS PRN
Status: DISCONTINUED | OUTPATIENT
Start: 2022-07-29 | End: 2022-08-02 | Stop reason: HOSPADM

## 2022-07-29 RX ORDER — ATENOLOL 25 MG/1
25 TABLET ORAL DAILY
Status: DISCONTINUED | OUTPATIENT
Start: 2022-07-30 | End: 2022-08-02 | Stop reason: HOSPADM

## 2022-07-29 RX ORDER — SODIUM CHLORIDE, SODIUM LACTATE, POTASSIUM CHLORIDE, CALCIUM CHLORIDE 600; 310; 30; 20 MG/100ML; MG/100ML; MG/100ML; MG/100ML
100 INJECTION, SOLUTION INTRAVENOUS CONTINUOUS
Status: DISPENSED | OUTPATIENT
Start: 2022-07-29 | End: 2022-07-30

## 2022-07-29 RX ORDER — METRONIDAZOLE 500 MG/100ML
500 INJECTION, SOLUTION INTRAVENOUS EVERY 8 HOURS
Status: DISCONTINUED | OUTPATIENT
Start: 2022-07-29 | End: 2022-07-31

## 2022-07-29 RX ORDER — ONDANSETRON 2 MG/ML
4 INJECTION INTRAMUSCULAR; INTRAVENOUS ONCE
Status: COMPLETED | OUTPATIENT
Start: 2022-07-29 | End: 2022-07-29

## 2022-07-29 RX ORDER — PRAVASTATIN SODIUM 10 MG
10 TABLET ORAL DAILY
Status: DISCONTINUED | OUTPATIENT
Start: 2022-07-30 | End: 2022-08-02 | Stop reason: HOSPADM

## 2022-07-29 RX ORDER — ASPIRIN 81 MG/1
81 TABLET, CHEWABLE ORAL DAILY
Status: DISCONTINUED | OUTPATIENT
Start: 2022-07-30 | End: 2022-08-02 | Stop reason: HOSPADM

## 2022-07-29 RX ORDER — ONDANSETRON 2 MG/ML
4 INJECTION INTRAMUSCULAR; INTRAVENOUS EVERY 6 HOURS PRN
Status: DISCONTINUED | OUTPATIENT
Start: 2022-07-29 | End: 2022-08-02 | Stop reason: HOSPADM

## 2022-07-29 RX ORDER — CALCIUM CARBONATE 500(1250)
1 TABLET ORAL 2 TIMES DAILY WITH MEALS
Status: DISCONTINUED | OUTPATIENT
Start: 2022-07-30 | End: 2022-08-02 | Stop reason: HOSPADM

## 2022-07-29 RX ORDER — OMEGA-3S/DHA/EPA/FISH OIL/D3 300MG-1000
400 CAPSULE ORAL DAILY
Status: DISCONTINUED | OUTPATIENT
Start: 2022-07-30 | End: 2022-08-02 | Stop reason: HOSPADM

## 2022-07-29 RX ORDER — HEPARIN SODIUM 5000 [USP'U]/ML
5000 INJECTION, SOLUTION INTRAVENOUS; SUBCUTANEOUS EVERY 8 HOURS SCHEDULED
Status: DISCONTINUED | OUTPATIENT
Start: 2022-07-29 | End: 2022-08-02 | Stop reason: HOSPADM

## 2022-07-29 RX ADMIN — ONDANSETRON 4 MG: 2 INJECTION INTRAMUSCULAR; INTRAVENOUS at 18:44

## 2022-07-29 RX ADMIN — HEPARIN SODIUM 5000 UNITS: 5000 INJECTION INTRAVENOUS; SUBCUTANEOUS at 21:46

## 2022-07-29 RX ADMIN — SODIUM CHLORIDE 1000 ML: 9 INJECTION, SOLUTION INTRAVENOUS at 18:42

## 2022-07-29 RX ADMIN — METRONIDAZOLE 500 MG: 500 INJECTION, SOLUTION INTRAVENOUS at 23:03

## 2022-07-29 RX ADMIN — SODIUM CHLORIDE, SODIUM LACTATE, POTASSIUM CHLORIDE, AND CALCIUM CHLORIDE 100 ML/HR: .6; .31; .03; .02 INJECTION, SOLUTION INTRAVENOUS at 21:46

## 2022-07-29 NOTE — ED PROVIDER NOTES
History  Chief Complaint   Patient presents with    Vomiting     Pt presents to ED due to persistent c/o nausea/vomiting/diarrhea  Pt here Monday  Pt unable to provide stool sample on Monday, brought sample today  78-year-old male presents to the emergency department complaining of continuous episodes of diarrhea, nausea, vomiting, fatigue, patient reports he was here on Monday and was seen and evaluated, the provider then attempted to get a stool sample to do fecal studies, however the patient was unable to give a sample at that time  The patient was encouraged to follow up with primary care for further analysis of his stools for possible infectious diarrhea, he does not have a primary care doctor and so returned here to the emergency department with a stool sample  The patient denies any fever, headache, does endorse a single episode of mild lightheadedness after stooling which was transient and only occurred once, no chest pain, cough, shortness of breath, hematemesis, hematochezia, melena, dysuria or hematuria, and no other complaints  The patient patient's wife at bedside notes that the patient has had so much liquid vomiting and stools that he feels his urine has decreased  Prior to Admission Medications   Prescriptions Last Dose Informant Patient Reported?  Taking?   aspirin 81 mg chewable tablet   Yes Yes   Sig: Chew 81 mg daily   atenolol (TENORMIN) 25 mg tablet   Yes Yes   Sig: Take 25 mg by mouth daily   calcium carbonate (OS-LITO) 600 MG tablet   Yes Yes   Sig: Take 600 mg by mouth 2 (two) times a day with meals   cholecalciferol (VITAMIN D3) 400 units tablet   Yes Yes   Sig: Take 400 Units by mouth daily   denosumab (Xgeva) 120 mg/1 7 mL   Yes Yes   Sig: Inject 120 mg under the skin once   lisinopril (ZESTRIL) 20 mg tablet  Self Yes Yes   Sig: Take 20 mg by mouth daily   metroNIDAZOLE (FLAGYL) 500 mg tablet   No Yes   Sig: Take 1 tablet (500 mg total) by mouth every 8 (eight) hours for 10 days   pravastatin (PRAVACHOL) 10 mg tablet   Yes Yes   Sig: Take 10 mg by mouth daily      Facility-Administered Medications: None       Past Medical History:   Diagnosis Date    Hypertension     Kidney cancer, primary, 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 [facility])     MI (myocardial infarction) ([facility] )        History reviewed  No pertinent surgical history  History reviewed  No pertinent family history  I have reviewed and agree with the history as documented  E-Cigarette/Vaping     E-Cigarette/Vaping Substances     Social History     Tobacco Use    Smoking status: Never Smoker    Smokeless tobacco: Never Used   Substance Use Topics    Alcohol use: Never    Drug use: Never       Review of Systems   Constitutional: Negative for fever  HENT: Negative for congestion  Eyes: Negative for visual disturbance  Respiratory: Negative for cough and shortness of breath  Cardiovascular: Negative for chest pain  Gastrointestinal: Positive for diarrhea and vomiting  Negative for abdominal pain  Endocrine: Negative for polyuria  Genitourinary: Negative for dysuria and hematuria  Musculoskeletal: Negative for myalgias  Neurological: Positive for light-headedness  Negative for headaches  Physical Exam  Physical Exam  Constitutional:       Appearance: Normal appearance  HENT:      Head: Normocephalic and atraumatic  Right Ear: External ear normal       Left Ear: External ear normal       Mouth/Throat:      Mouth: Mucous membranes are moist       Pharynx: Oropharynx is clear  Eyes:      Conjunctiva/sclera: Conjunctivae normal       Pupils: Pupils are equal, round, and reactive to light  Cardiovascular:      Rate and Rhythm: Normal rate and regular rhythm  Heart sounds: Normal heart sounds  Pulmonary:      Breath sounds: Normal breath sounds  Abdominal:      General: Abdomen is flat  There is no distension  Palpations: Abdomen is soft     Musculoskeletal:         General: No deformity  Normal range of motion  Cervical back: Normal range of motion  Skin:     General: Skin is warm and dry  Neurological:      General: No focal deficit present  Mental Status: He is alert and oriented to person, place, and time     Psychiatric:         Mood and Affect: Mood normal          Behavior: Behavior normal          Vital Signs  ED Triage Vitals [07/29/22 1737]   Temperature Pulse Respirations Blood Pressure SpO2   98 °F (36 7 °C) (!) 115 16 116/69 96 %      Temp Source Heart Rate Source Patient Position - Orthostatic VS BP Location FiO2 (%)   Oral Monitor Sitting Left arm --      Pain Score       No Pain           Vitals:    08/02/22 1514 08/02/22 1519 08/02/22 1528 08/02/22 1613   BP: 93/51 127/59  127/67   Pulse: 60 67 68 75   Patient Position - Orthostatic VS:    Sitting         Visual Acuity      ED Medications  Medications   lactated ringers infusion (0 mL/hr Intravenous Stopped 7/31/22 0406)   sodium chloride 0 9 % bolus 1,000 mL (0 mL Intravenous Stopped 7/29/22 2039)   ondansetron (ZOFRAN) injection 4 mg (4 mg Intravenous Given 7/29/22 1844)   potassium chloride (K-DUR,KLOR-CON) CR tablet 40 mEq (40 mEq Oral Given 7/30/22 0641)   loperamide (IMODIUM) capsule 2 mg (2 mg Oral Given 7/30/22 2041)   potassium chloride (K-DUR,KLOR-CON) CR tablet 40 mEq (40 mEq Oral Given 7/31/22 0621)   magnesium sulfate 2 g/50 mL IVPB (premix) 2 g (2 g Intravenous New Bag 7/31/22 0918)   calcium gluconate 1 g in sodium chloride 0 9% 50 mL (premix) (1 g Intravenous New Bag 7/31/22 1431)   polyethylene glycol (GLYCOLAX) bowel prep 238 g (238 g Oral Given 8/1/22 1654)   simethicone (MYLICON) oral suspension (40 mg Oral Given 8/2/22 1458)       Diagnostic Studies  Results Reviewed     Procedure Component Value Units Date/Time    Stool Enteric Bacterial Panel by PCR [[id number]]  (Normal) Collected: 07/29/22 1919    Lab Status: Final result Specimen: Stool from Per Rectum Updated: 07/30/22 6947 Salmonella sp PCR None Detected     Shigella sp/Enteroinvasive E  coli (EIEC) PCR None Detected     Campylobacter sp (jejuni and coli) PCR None Detected     Shiga toxin 1/Shiga toxin 2 genes PCR None Detected    Clostridium difficile toxin by PCR with EIA [[id number]]  (Normal) Collected: 07/29/22 1919    Lab Status: Final result Specimen: Stool from Per Rectum Updated: 07/30/22 0911     C difficile toxin by PCR Negative    HS Troponin I 4hr [[id number]]  (Normal) Collected: 07/29/22 2311    Lab Status: Final result Specimen: Blood from Arm, Left Updated: 07/29/22 2358     hs TnI 4hr 15 ng/L      Delta 4hr hsTnI 2 ng/L     COVID/FLU/RSV [[id number]]  (Normal) Collected: 07/29/22 2104    Lab Status: Final result Specimen: Nares from Nose Updated: 07/29/22 2147     SARS-CoV-2 Negative     INFLUENZA A PCR Negative     INFLUENZA B PCR Negative     RSV PCR Negative    Narrative:      FOR PEDIATRIC PATIENTS - copy/paste COVID Guidelines URL to browser: [email or web address]  [employer]    SARS-CoV-2 assay is a Nucleic Acid Amplification assay intended for the  qualitative detection of nucleic acid from SARS-CoV-2 in nasopharyngeal  swabs  Results are for the presumptive identification of SARS-CoV-2 RNA  Positive results are indicative of infection with SARS-CoV-2, the virus  causing COVID-19, but do not rule out bacterial infection or co-infection  with other viruses  Laboratories within the [place] and its  territories are required to report all positive results to the appropriate  public health authorities  Negative results do not preclude SARS-CoV-2  infection and should not be used as the sole basis for treatment or other  patient management decisions  Negative results must be combined with  clinical observations, patient history, and epidemiological information  This test has not been FDA cleared or approved      This test has been authorized by FDA under an Emergency Use Authorization  (EUA)  This test is only authorized for the duration of time the  declaration that circumstances exist justifying the authorization of the  emergency use of an in vitro diagnostic tests for detection of SARS-CoV-2  virus and/or diagnosis of COVID-19 infection under section 564(b)(1) of  the Act, 21 U  S C  870DPK-4(H)(5), unless the authorization is terminated  or revoked sooner  The test has been validated but independent review by FDA  and CLIA is pending  Test performed using [employer] Gene[employer]pert: This RT-PCR assay targets N2,  a region unique to SARS-CoV-2  A conserved region in the E-gene was chosen  for pan-Sarbecovirus detection which includes SARS-CoV-2      Procalcitonin [[id number]]  (Abnormal) Collected: 07/29/22 1840    Lab Status: Final result Specimen: Blood from Arm, Right Updated: 07/29/22 2141     Procalcitonin 0 38 ng/ml     HS Troponin I 2hr [[id number]]  (Normal) Collected: 07/29/22 2101    Lab Status: Final result Specimen: Blood from Arm, Right Updated: 07/29/22 2129     hs TnI 2hr 15 ng/L      Delta 2hr hsTnI 2 ng/L     HS Troponin 0hr (reflex protocol) [[id number]]  (Normal) Collected: 07/29/22 1840    Lab Status: Final result Specimen: Blood from Arm, Right Updated: 07/29/22 1924     hs TnI 0hr 13 ng/L     Comprehensive metabolic panel [[id number]]  (Abnormal) Collected: 07/29/22 1840    Lab Status: Final result Specimen: Blood from Arm, Right Updated: 07/29/22 1905     Sodium 137 mmol/L      Potassium 3 7 mmol/L      Chloride 111 mmol/L      CO2 16 mmol/L      ANION GAP 10 mmol/L      BUN 22 mg/dL      Creatinine 1 74 mg/dL      Glucose 133 mg/dL      Calcium 8 1 mg/dL      AST 32 U/L      ALT 36 U/L      Alkaline Phosphatase 73 U/L      Total Protein 6 9 g/dL      Albumin 3 8 g/dL      Total Bilirubin 1 32 mg/dL      eGFR 36 ml/min/1 73sq m     Narrative:      [place] guidelines for Chronic Kidney Disease (CKD):     Stage 1 with normal or high GFR (GFR > 90 mL/min/1 73 square meters)    Stage 2 Mild CKD (GFR = 60-89 mL/min/1 73 square meters)    Stage 3A Moderate CKD (GFR = 45-59 mL/min/1 73 square meters)    Stage 3B Moderate CKD (GFR = 30-44 mL/min/1 73 square meters)    Stage 4 Severe CKD (GFR = 15-29 mL/min/1 73 square meters)    Stage 5 End Stage CKD (GFR <15 mL/min/1 73 square meters)  Note: GFR calculation is accurate only with a steady state creatinine    Magnesium [[id number]]  (Normal) Collected: 07/29/22 1840    Lab Status: Final result Specimen: Blood from Arm, Right Updated: 07/29/22 1905     Magnesium 2 1 mg/dL     CBC and differential [[id number]]  (Abnormal) Collected: 07/29/22 1840    Lab Status: Final result Specimen: Blood from Arm, Right Updated: 07/29/22 1850     WBC 9 45 Thousand/uL      RBC 4 89 Million/uL      Hemoglobin 15 5 g/dL      Hematocrit 46 0 %      MCV 94 fL      MCH 31 7 pg      MCHC 33 7 g/dL      RDW 13 4 %      MPV 8 3 fL      Platelets 047 Thousands/uL      nRBC 0 /100 WBCs      Neutrophils Relative 78 %      Immat GRANS % 1 %      Lymphocytes Relative 12 %      Monocytes Relative 9 %      Eosinophils Relative 0 %      Basophils Relative 0 %      Neutrophils Absolute 7 37 Thousands/µL      Immature Grans Absolute 0 06 Thousand/uL      Lymphocytes Absolute 1 10 Thousands/µL      Monocytes Absolute 0 86 Thousand/µL      Eosinophils Absolute 0 02 Thousand/µL      Basophils Absolute 0 04 Thousands/µL                  No orders to display              Procedures  ECG 12 Lead Documentation Only    Date/Time: 7/29/2022 6:55 PM  Performed by: Antonietta Mo MD  Authorized by: Antonietta Mo MD     ECG reviewed by me, the ED Provider: yes    Patient location:  ED  Previous ECG:     Previous ECG:  Unavailable  Interpretation:     Interpretation: normal    Rate:     ECG rate:  95    ECG rate assessment: normal    Rhythm:     Rhythm: sinus rhythm    Ectopy:     Ectopy: PVCs    QRS:     QRS axis:  Normal QRS intervals:  Normal  Conduction:     Conduction: normal    ST segments:     ST segments:  Normal  T waves:     T waves: normal               ED Course       SBIRT 22yo+    Flowsheet Row Most Recent Value   SBIRT (25 yo +)    In order to provide better care to our patients, we are screening all of our patients for alcohol and drug use  Would it be okay to ask you these screening questions? Yes Filed at: 07/29/2022 1840   Initial Alcohol Screen: US AUDIT-C     1  How often do you have a drink containing alcohol? 0 Filed at: 07/29/2022 1840   2  How many drinks containing alcohol do you have on a typical day you are drinking? 0 Filed at: 07/29/2022 1840   3b  FEMALE Any Age, or MALE 65+: How often do you have 4 or more drinks on one occassion? 0 Filed at: 07/29/2022 1840   Audit-C Score 0 Filed at: 07/29/2022 1840   CINDY: How many times in the past year have you    Used an illegal drug or used a prescription medication for non-medical reasons? Never Filed at: 07/29/2022 1840                    MDM  Number of Diagnoses or Management Options  [place] [facility] )  Dehydration  Diarrhea of presumed infectious origin  Diagnosis management comments: Patient will be evaluated for dehydration, acute kidney injury, and stool studies will be attempted to be obtained from patient's home stool sample  Patient will be given fluids, Zofran, and will have a p o  Challenge prior to discharge to ensure he is able to tolerate p o  Intake at home  Patient will be given a prescription for Zofran if discharged, and will be given instructions on how to follow-up with GI and primary care for further management  1957  Patient on evaluation was found to have an acute kidney injury, patient therefore will be admitted for further management, stool studies have been sent, patient is agreeable with treatment plan        Disposition  Final diagnoses:   [place] (acute kidney injury) ([facility] )   Dehydration   Diarrhea of presumed infectious origin     Time reflects when diagnosis was documented in both MDM as applicable and the Disposition within this note     Time User Action Codes Description Comment    7/29/2022  7:52 PM Roosevelt [place] Add [N17 9] [place] (acute kidney injury) ([place] [address] )     7/29/2022  7:52 PM Page Pel [E86 0] Dehydration     7/29/2022  7:53 PM Roosevelt Plush Add [R19 7] Diarrhea of presumed infectious origin     7/30/2022 10:28 AM Janelle Shaver Add [C64 1] 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 ([place] [address] )     7/31/2022 10:47 AM Tal Huston Add [R11 2,  R19 7] Nausea vomiting and diarrhea     8/1/2022  3:31 PM Parul Ceballos [R19 7] Diarrhea, unspecified type     8/2/2022 10:12 AM Rocael Jarquin Add [R11 2] Nausea and vomiting, unspecified vomiting type     8/2/2022  2:50 PM Any Kathleen Modify [N17 9] [place] (acute kidney injury) ([place] [address] )     8/2/2022  2:50 PM Any Kathleen Modify [E86 0] Dehydration     8/2/2022  2:50 PM Any Kathleen Modify [R19 7] Diarrhea of presumed infectious origin     8/2/2022  2:50 PM Any Kathleen Modify [C64 1] 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 [facility])     8/2/2022  2:50 PM Any Kathleen Modify [R11 2,  R19 7] Nausea vomiting and diarrhea     8/2/2022  2:50 PM Any Kathleen Modify [R19 7] Diarrhea, unspecified type     8/2/2022  2:50 PM Any Kathleen Modify [R11 2] Nausea and vomiting, unspecified vomiting type     8/2/2022  4:59 PM Isac, [address] [place] [address] [N17 9] [place] (acute kidney injury) ([place] [address] )     8/2/2022  4:59 PM Orlean Jean-Paul [E86 0] Dehydration     8/2/2022  4:59 PM Orlejoao Jean-Paul [R19 7] Diarrhea of presumed infectious origin     8/2/2022  4:59 PM Isac, [place] [facility] [C64 1] 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 [facility])     8/2/2022  4:59 PM Isac, [address] [facility] [R11 2,  R19 7] Nausea vomiting and diarrhea 8/2/2022  4:59 PM IsacAlena [R19 7] Diarrhea, unspecified type     8/2/2022  4:59 PM Isac, [address] [place] [address] [R11 2] Nausea and vomiting, unspecified vomiting type     8/2/2022  4:59 PM Isac, [address] [place] [address] Rosie [K29 70] Gastritis       ED Disposition     ED Disposition   Admit    Condition   Stable    Date/Time   Fri Jul 29, 2022  7:52 PM    Comment   Case was discussed with Dr Fallon Camacho and the patient's admission status was agreed to be Admission Status: inpatient status to the service of Dr Fallon Camacho   Follow-up Information     Follow up With Specialties Details Why [address]  [facility], DO Internal Medicine Call in 1 week(s)  [address] [facility] [address] [place] [address] [place] Rosie Valdovinos MD Hematology, Hematology and Oncology, Oncology Follow up in 1 week(s)  [address]  [phone]      Alexandra Christian MD Gastroenterology Follow up in 2 week(s)  [address] [facility]  [phone]            Discharge Medication List as of 8/2/2022  5:25 PM      START taking these medications    Details   cholestyramine (QUESTRAN) 4 g packet Take 1 packet (4 g total) by mouth 2 (two) times a day with meals, Starting Tue 8/2/2022, Until Thu 9/1/2022, Normal      omeprazole (PriLOSEC) 40 MG capsule Take 1 capsule (40 mg total) by mouth 2 (two) times a day, Starting Tue 8/2/2022, Until Mon 10/31/2022, Normal      psyllium (METAMUCIL) packet Take 1 packet by mouth daily Do not take within two hour window of taking medications  , Starting Tue 8/2/2022, No Print         CONTINUE these medications which have NOT CHANGED    Details   aspirin 81 mg chewable tablet Chew 81 mg daily, Historical Med      atenolol (TENORMIN) 25 mg tablet Take 25 mg by mouth daily, Historical Med      calcium carbonate (OS-LITO) 600 MG tablet Take 600 mg by mouth 2 (two) times a day with meals, Historical Med      cholecalciferol (VITAMIN D3) 400 units tablet Take 400 Units by mouth daily, Historical Med      denosumab (Xgeva) 120 mg/1 7 mL Inject 120 mg under the skin once, Historical Med      lisinopril (ZESTRIL) 20 mg tablet Take 20 mg by mouth daily, Historical Med      pravastatin (PRAVACHOL) 10 mg tablet Take 10 mg by mouth daily, Historical Med      ondansetron (Zofran ODT) 4 mg disintegrating tablet Take 1 tablet (4 mg total) by mouth every 6 (six) hours as needed for nausea or vomiting, Starting Fri 7/8/2022, Normal         STOP taking these medications       metroNIDAZOLE (FLAGYL) 500 mg tablet Comments:   Reason for Stopping:               Outpatient Discharge Orders   Discharge Diet     Discharge Diet     Activity as tolerated       PDMP Review     None          ED Provider  Electronically Signed by           Jonatan Larsen MD  08/13/22 0486

## 2022-07-30 PROBLEM — E87.6 HYPOKALEMIA: Status: ACTIVE | Noted: 2022-07-30

## 2022-07-30 LAB
ANION GAP SERPL CALCULATED.3IONS-SCNC: 8 MMOL/L (ref 4–13)
ATRIAL RATE: 99 BPM
BASOPHILS # BLD AUTO: 0.05 THOUSANDS/ΜL (ref 0–0.1)
BASOPHILS NFR BLD AUTO: 1 % (ref 0–1)
BUN SERPL-MCNC: 21 MG/DL (ref 5–25)
C DIFF TOX GENS STL QL NAA+PROBE: NEGATIVE
CALCIUM SERPL-MCNC: 7.3 MG/DL (ref 8.4–10.2)
CAMPYLOBACTER DNA SPEC NAA+PROBE: NORMAL
CHLORIDE SERPL-SCNC: 115 MMOL/L (ref 96–108)
CO2 SERPL-SCNC: 15 MMOL/L (ref 21–32)
CREAT SERPL-MCNC: 1.31 MG/DL (ref 0.6–1.3)
EOSINOPHIL # BLD AUTO: 0.07 THOUSAND/ΜL (ref 0–0.61)
EOSINOPHIL NFR BLD AUTO: 1 % (ref 0–6)
ERYTHROCYTE [DISTWIDTH] IN BLOOD BY AUTOMATED COUNT: 13.7 % (ref 11.6–15.1)
GFR SERPL CREATININE-BSD FRML MDRD: 51 ML/MIN/1.73SQ M
GLUCOSE SERPL-MCNC: 107 MG/DL (ref 65–140)
HCT VFR BLD AUTO: 37.4 % (ref 36.5–49.3)
HGB BLD-MCNC: 13 G/DL (ref 12–17)
IMM GRANULOCYTES # BLD AUTO: 0.06 THOUSAND/UL (ref 0–0.2)
IMM GRANULOCYTES NFR BLD AUTO: 1 % (ref 0–2)
LYMPHOCYTES # BLD AUTO: 1.12 THOUSANDS/ΜL (ref 0.6–4.47)
LYMPHOCYTES NFR BLD AUTO: 12 % (ref 14–44)
MCH RBC QN AUTO: 32.4 PG (ref 26.8–34.3)
MCHC RBC AUTO-ENTMCNC: 34.8 G/DL (ref 31.4–37.4)
MCV RBC AUTO: 93 FL (ref 82–98)
MONOCYTES # BLD AUTO: 1 THOUSAND/ΜL (ref 0.17–1.22)
MONOCYTES NFR BLD AUTO: 11 % (ref 4–12)
NEUTROPHILS # BLD AUTO: 7.02 THOUSANDS/ΜL (ref 1.85–7.62)
NEUTS SEG NFR BLD AUTO: 74 % (ref 43–75)
NRBC BLD AUTO-RTO: 0 /100 WBCS
P AXIS: 63 DEGREES
PLATELET # BLD AUTO: 365 THOUSANDS/UL (ref 149–390)
PMV BLD AUTO: 8.6 FL (ref 8.9–12.7)
POTASSIUM SERPL-SCNC: 3.3 MMOL/L (ref 3.5–5.3)
PR INTERVAL: 152 MS
PROCALCITONIN SERPL-MCNC: 0.3 NG/ML
QRS AXIS: 61 DEGREES
QRSD INTERVAL: 76 MS
QT INTERVAL: 344 MS
QTC INTERVAL: 433 MS
RBC # BLD AUTO: 4.01 MILLION/UL (ref 3.88–5.62)
SALMONELLA DNA SPEC QL NAA+PROBE: NORMAL
SHIGA TOXIN STX GENE SPEC NAA+PROBE: NORMAL
SHIGELLA DNA SPEC QL NAA+PROBE: NORMAL
SODIUM SERPL-SCNC: 138 MMOL/L (ref 135–147)
T WAVE AXIS: 34 DEGREES
VENTRICULAR RATE: 95 BPM
WBC # BLD AUTO: 9.32 THOUSAND/UL (ref 4.31–10.16)

## 2022-07-30 PROCEDURE — 93010 ELECTROCARDIOGRAM REPORT: CPT | Performed by: INTERNAL MEDICINE

## 2022-07-30 PROCEDURE — 85025 COMPLETE CBC W/AUTO DIFF WBC: CPT | Performed by: NURSE PRACTITIONER

## 2022-07-30 PROCEDURE — 80048 BASIC METABOLIC PNL TOTAL CA: CPT | Performed by: NURSE PRACTITIONER

## 2022-07-30 PROCEDURE — 99232 SBSQ HOSP IP/OBS MODERATE 35: CPT | Performed by: INTERNAL MEDICINE

## 2022-07-30 PROCEDURE — 84145 PROCALCITONIN (PCT): CPT | Performed by: NURSE PRACTITIONER

## 2022-07-30 RX ORDER — POTASSIUM CHLORIDE 20 MEQ/1
40 TABLET, EXTENDED RELEASE ORAL ONCE
Status: COMPLETED | OUTPATIENT
Start: 2022-07-30 | End: 2022-07-30

## 2022-07-30 RX ORDER — LOPERAMIDE HYDROCHLORIDE 2 MG/1
2 CAPSULE ORAL ONCE
Status: COMPLETED | OUTPATIENT
Start: 2022-07-30 | End: 2022-07-30

## 2022-07-30 RX ORDER — CEFTRIAXONE 1 G/50ML
1000 INJECTION, SOLUTION INTRAVENOUS EVERY 24 HOURS
Status: DISCONTINUED | OUTPATIENT
Start: 2022-07-30 | End: 2022-07-31

## 2022-07-30 RX ADMIN — ONDANSETRON 4 MG: 2 INJECTION INTRAMUSCULAR; INTRAVENOUS at 11:56

## 2022-07-30 RX ADMIN — ONDANSETRON 4 MG: 2 INJECTION INTRAMUSCULAR; INTRAVENOUS at 05:20

## 2022-07-30 RX ADMIN — METRONIDAZOLE 500 MG: 500 INJECTION, SOLUTION INTRAVENOUS at 14:45

## 2022-07-30 RX ADMIN — HEPARIN SODIUM 5000 UNITS: 5000 INJECTION INTRAVENOUS; SUBCUTANEOUS at 05:10

## 2022-07-30 RX ADMIN — PRAVASTATIN SODIUM 10 MG: 10 TABLET ORAL at 08:11

## 2022-07-30 RX ADMIN — CALCIUM 1 TABLET: 500 TABLET ORAL at 08:11

## 2022-07-30 RX ADMIN — CALCIUM 1 TABLET: 500 TABLET ORAL at 17:49

## 2022-07-30 RX ADMIN — CEFTRIAXONE 1000 MG: 1 INJECTION, SOLUTION INTRAVENOUS at 10:54

## 2022-07-30 RX ADMIN — ATENOLOL 25 MG: 25 TABLET ORAL at 08:11

## 2022-07-30 RX ADMIN — ASPIRIN 81 MG CHEWABLE TABLET 81 MG: 81 TABLET CHEWABLE at 08:11

## 2022-07-30 RX ADMIN — HEPARIN SODIUM 5000 UNITS: 5000 INJECTION INTRAVENOUS; SUBCUTANEOUS at 20:41

## 2022-07-30 RX ADMIN — POTASSIUM CHLORIDE 40 MEQ: 1500 TABLET, EXTENDED RELEASE ORAL at 06:41

## 2022-07-30 RX ADMIN — HEPARIN SODIUM 5000 UNITS: 5000 INJECTION INTRAVENOUS; SUBCUTANEOUS at 14:45

## 2022-07-30 RX ADMIN — LOPERAMIDE HYDROCHLORIDE 2 MG: 2 CAPSULE ORAL at 20:41

## 2022-07-30 RX ADMIN — CHOLECALCIFEROL TAB 10 MCG (400 UNIT) 400 UNITS: 10 TAB at 08:11

## 2022-07-30 RX ADMIN — METRONIDAZOLE 500 MG: 500 INJECTION, SOLUTION INTRAVENOUS at 05:10

## 2022-07-30 RX ADMIN — METRONIDAZOLE 500 MG: 500 INJECTION, SOLUTION INTRAVENOUS at 20:41

## 2022-07-30 RX ADMIN — SODIUM CHLORIDE, SODIUM LACTATE, POTASSIUM CHLORIDE, AND CALCIUM CHLORIDE 100 ML/HR: .6; .31; .03; .02 INJECTION, SOLUTION INTRAVENOUS at 09:08

## 2022-07-30 NOTE — ASSESSMENT & PLAN NOTE
· Presentation:  Patient presents with complaints of continued episodes of diarrhea, nausea and vomiting for the past 3 weeks  Of note, patient moved from [place] 3 weeks ago for continued renal cancer chemotherapy treatment with most recent treatment 2 months ago  He denies hematemesis, hematochezia, melena, fever, chills or abdominal pain  · CT A/P on 07/25/2022: No peripancreatic fluid collections seen  No biliary dilation seen  Fluid-filled bowel loops throughout the abdomen without bowel obstruction  · C diff negative, COVID negative  · Stool enteric bacterial panel pending  · IV hydration Ringer lactate 100 mL/hour  · IV antiemetics  · Started on PO Flagyl on ED discharge on 07/25/2022  Continue IV Flagyl  · Added IV ceftriaxone day 1  · Procalcitonin 0 38, repeat in AM   · Clear liquid diet, advance as tolerated

## 2022-07-30 NOTE — ASSESSMENT & PLAN NOTE
 Creatinine upon admission: 1 74  o Baseline: 0 9    Lab Results   Component Value Date    CREATININE 1 31 (H) 07/30/2022    CREATININE 1 74 (H) 07/29/2022    CREATININE 0 94 07/25/2022       Suspect prerenal secondary to N/V/D    Treatment:  Continue IV hydration   Avoid nephrotoxins and hypotension   Urinary tension protocol   Monitor BMP tomorrow paul Marte

## 2022-07-30 NOTE — H&P
[facility]  H&P- Eugenia Chung 1945, 76 y o  male MRN: [id number]  Unit/Bed#: S -01 Encounter: [id number]  Primary Care Provider: No primary care provider on file  Date and time admitted to hospital: 7/29/2022  6:04 PM    * [place] (acute kidney injury) [facility])  Assessment & Plan   Creatinine upon admission: 1 74  o Baseline: 0 9   Suspect prerenal secondary to N/V/D    Treatment: IV hydration   Avoid nephrotoxins and hypotension   Urinary tension protocol   Monitor [facility]  Keila Reilly Nephrology consult  Nausea vomiting and diarrhea  Assessment & Plan  · Presentation:  Patient presents with complaints of continued episodes of diarrhea, nausea and vomiting for the past 3 weeks  Of note, patient moved from [place] 3 weeks ago for continued renal cancer chemotherapy treatment with most recent treatment 2 months ago  He denies hematemesis, hematochezia, melena, fever, chills or abdominal pain  · CT A/P on 07/25/2022: No peripancreatic fluid collections seen  No biliary dilation seen  Fluid-filled bowel loops throughout the abdomen without bowel obstruction  · Stool enteric bacterial panel and C diff sample pending  · IV hydration  · IV antiemetics  · Started on PO Flagyl on ED discharge on 07/25/2022  Continue IV Flagyl  · Procalcitonin 0 38, repeat in AM   · Clear liquid diet, advance as tolerated  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 [facility])  Assessment & Plan  · Patient has recently established care with Dr Ryan Newton of Hematology-Oncology  · 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 with multiple retroperitoneal, pelvic lymphadenopathy, bilateral adrenal gland involvement, bilateral lung involvement and left femur head involvement status post ORIF followed by radiation therapy he received 4 cycles of ipilimumab/nivolumab as of 11/01/2018 in [facility]    · Most recent PET scan on 05/23/2022 displayed favorable response involving the lungs, retroperitoneal, bilateral adrenal glands and primary in the right kidney  · Current treatment plan of nivolumab 480 mg every month and Xgeva every 3 months  · Patient moved from [place] 3 weeks ago for continued cancer chemotherapy treatment with most recent treatment 2 months ago in [facility]  Patient was scheduled for first chemo treatment here today but not able to receive due to current state of health  Hypertension  Assessment & Plan   Blood pressure is reviewed and acceptable   o Last recorded pressure:  116/69   Continue atenolol  Hold lisinopril in the setting of [place]   Monitor blood pressure  VTE Pharmacologic Prophylaxis: VTE Score: 6 High Risk (Score >/= 5) - Pharmacological DVT Prophylaxis Ordered: heparin  Sequential Compression Devices Ordered  Code Status: Level 3 - DNAR and DNI per patient  Discussion with family: Updated  (wife and son) at bedside  Anticipated Length of Stay: Patient will be admitted on an inpatient basis with an anticipated length of stay of greater than 2 midnights secondary to V[place] and N/V/D  Total Time for Visit, including Counseling / Coordination of Care: 70 minutes Greater than 50% of this total time spent on direct patient counseling and coordination of care  Chief Complaint:  Nausea, vomiting and diarrhea    History of Present Illness:  Sophia Lee is a 76 y o  male with a PMH of stage IV renal carcinoma with metastasis, hypertension and MI who presents with continued episodes of diarrhea, nausea and vomiting for the past 3 weeks  Patient [facility] on 07/25/2022 for similar complaints  At that time, he was unable to provide stool sample and was discharged home for outpatient follow-up  However, due to recent relocation to this area from [place] he does not have established care with a PCP; therefore, he returned to the ED    Patient moved from [place] 3 weeks ago for continued renal cancer chemotherapy treatment with most recent treatment 2 months ago in [facility]  Patient was scheduled for first chemo treatment here today but not able to receive due to current state of health  He denies hematemesis, hematochezia, melena, fever, chills or abdominal pain  Review of Systems:  Review of Systems   Constitutional: Negative for chills and fever  Respiratory: Negative for chest tightness and shortness of breath  Cardiovascular: Negative for chest pain  Gastrointestinal: Positive for diarrhea, nausea and vomiting  Negative for abdominal pain and blood in stool  All other systems reviewed and are negative  Past Medical and Surgical History:   Past Medical History:   Diagnosis Date    Hypertension     Kidney cancer, primary, 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 [facility])     MI (myocardial infarction) ([facility] [address] )        History reviewed  No pertinent surgical history  Meds/Allergies:  Prior to Admission medications    Medication Sig Start Date End Date Taking?  Authorizing Provider   aspirin 81 mg chewable tablet Chew 81 mg daily   Yes Historical Provider, MD   atenolol (TENORMIN) 25 mg tablet Take 25 mg by mouth daily   Yes Historical Provider, MD   calcium carbonate (OS-LITO) 600 MG tablet Take 600 mg by mouth 2 (two) times a day with meals   Yes Historical Provider, MD   cholecalciferol (VITAMIN D3) 400 units tablet Take 400 Units by mouth daily   Yes Historical Provider, MD   denosumab (Xgeva) 120 mg/1 7 mL Inject 120 mg under the skin once   Yes Historical Provider, MD   lisinopril (ZESTRIL) 20 mg tablet Take 20 mg by mouth daily   Yes Historical Provider, MD   metroNIDAZOLE (FLAGYL) 500 mg tablet Take 1 tablet (500 mg total) by mouth every 8 (eight) hours for 10 days 7/25/22 8/4/22 Yes Rashawn Bryant MD   ondansetron (Zofran ODT) 4 mg disintegrating tablet Take 1 tablet (4 mg total) by mouth every 6 (six) hours as needed for nausea or vomiting 7/8/22  Yes Delano Dubin Ezekiel Escalona MD   pravastatin (PRAVACHOL) 10 mg tablet Take 10 mg by mouth daily   Yes Historical Provider, MD   ondansetron (ZOFRAN) 4 mg tablet Take 4 mg by mouth every 8 (eight) hours as needed for nausea or vomiting  7/29/22 Yes Historical Provider, MD     I have reviewed home medications with patient personally  Allergies: Allergies   Allergen Reactions    Latex Rash       Social History:  Marital Status: /Civil Union   Occupation: Unknown  Patient Pre-hospital Living Situation: Home, With spouse  Patient Pre-hospital Level of Mobility: walks  Patient Pre-hospital Diet Restrictions: None  Substance Use History:   Social History     Substance and Sexual Activity   Alcohol Use Never     Social History     Tobacco Use   Smoking Status Never Smoker   Smokeless Tobacco Never Used     Social History     Substance and Sexual Activity   Drug Use Never       Family History:  History reviewed  No pertinent family history  Physical Exam:     Vitals:   Blood Pressure: 148/82 (07/29/22 2124)  Pulse: 96 (07/29/22 2124)  Temperature: (!) 97 4 °F (36 3 °C) (07/29/22 2124)  Temp Source: Oral (07/29/22 2100)  Respirations: 17 (07/29/22 2124)  Height: 5' 8" (172 7 cm) (07/29/22 2100)  Weight - Scale: 67 5 kg (148 lb 13 oz) (07/29/22 2119)  SpO2: 96 % (07/29/22 2124)    Physical Exam  Vitals and nursing note reviewed  Constitutional:       General: He is not in acute distress  Appearance: He is not toxic-appearing or diaphoretic  HENT:      Head: Normocephalic  Nose: Nose normal       Mouth/Throat:      Pharynx: Oropharynx is clear  Eyes:      General: No scleral icterus  Conjunctiva/sclera: Conjunctivae normal    Cardiovascular:      Rate and Rhythm: Normal rate and regular rhythm  Pulses: Normal pulses  Heart sounds: Normal heart sounds  No murmur heard  Pulmonary:      Effort: Pulmonary effort is normal  No respiratory distress  Breath sounds: Normal breath sounds   No wheezing, rhonchi or rales  Chest:      Chest wall: No tenderness  Abdominal:      General: Bowel sounds are normal  There is no distension  Palpations: Abdomen is soft  Tenderness: There is no abdominal tenderness  Musculoskeletal:         General: No swelling or deformity  Normal range of motion  Cervical back: Normal range of motion  Skin:     General: Skin is warm and dry  Capillary Refill: Capillary refill takes 2 to 3 seconds  Neurological:      Mental Status: He is alert and oriented to person, place, and time  Psychiatric:         Speech: Speech normal           Additional Data:     Lab Results:  Results from last 7 days   Lab Units 07/29/22  1840   WBC Thousand/uL 9 45   HEMOGLOBIN g/dL 15 5   HEMATOCRIT % 46 0   PLATELETS Thousands/uL 442*   NEUTROS PCT % 78*   LYMPHS PCT % 12*   MONOS PCT % 9   EOS PCT % 0     Results from last 7 days   Lab Units 07/29/22  1840   SODIUM mmol/L 137   POTASSIUM mmol/L 3 7   CHLORIDE mmol/L 111*   CO2 mmol/L 16*   BUN mg/dL 22   CREATININE mg/dL 1 74*   ANION GAP mmol/L 10   CALCIUM mg/dL 8 1*   ALBUMIN g/dL 3 8   TOTAL BILIRUBIN mg/dL 1 32*   ALK PHOS U/L 73   ALT U/L 36   AST U/L 32   GLUCOSE RANDOM mg/dL 133                 Results from last 7 days   Lab Units 07/29/22  1840   PROCALCITONIN ng/ml 0 38*       Imaging: Personally reviewed the following imaging: abdominal/pelvic CT  No orders to display       EKG and Other Studies Reviewed on Admission:   · EKG: Sinus rhythm with occasional PVCs, heart rate 95       ** Please Note: This note has been constructed using a voice recognition system   **

## 2022-07-30 NOTE — PROGRESS NOTES
[facility]  Progress Note Sofya oRwe 1945, 76 y o  male MRN: [id number]  Unit/Bed#: S -01 Encounter: [id number]  Primary Care Provider: No primary care provider on file  Date and time admitted to hospital: 7/29/2022  6:04 PM    * [place] (acute kidney injury) [facility])  Assessment & Plan   Creatinine upon admission: 1 74  o Baseline: 0 9    Lab Results   Component Value Date    CREATININE 1 31 (H) 07/30/2022    CREATININE 1 74 (H) 07/29/2022    CREATININE 0 94 07/25/2022       Suspect prerenal secondary to N/V/D    Treatment:  Continue IV hydration   Avoid nephrotoxins and hypotension   Urinary tension protocol   Monitor BMP tomorrow paul Lomas Hypokalemia  Assessment & Plan  Lab Results   Component Value Date    K 3 3 (L) 07/30/2022    K 3 7 07/29/2022      Repleted potassium 40 mEq oral 1 dose this morning  Monitor daily serum potassium and replete as needed  Hypertension  Assessment & Plan   Blood pressure is reviewed and acceptable   o Last recorded pressure:  116/69   Continue atenolol  Hold lisinopril in the setting of [place]   Monitor blood pressure  Nausea vomiting and diarrhea  Assessment & Plan  · Presentation:  Patient presents with complaints of continued episodes of diarrhea, nausea and vomiting for the past 3 weeks  Of note, patient moved from [place] 3 weeks ago for continued renal cancer chemotherapy treatment with most recent treatment 2 months ago  He denies hematemesis, hematochezia, melena, fever, chills or abdominal pain  · CT A/P on 07/25/2022: No peripancreatic fluid collections seen  No biliary dilation seen  Fluid-filled bowel loops throughout the abdomen without bowel obstruction  · C diff negative, COVID negative  · Stool enteric bacterial panel pending  · IV hydration Ringer lactate 100 mL/hour  · IV antiemetics  · Started on PO Flagyl on ED discharge on 07/25/2022  Continue IV Flagyl    · Added IV ceftriaxone day 1  · Procalcitonin 0 38, repeat in AM   · Clear liquid diet, advance as tolerated  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 [facility])  Assessment & Plan  · Patient has recently established care with Dr Jessie Acuna of Hematology-Oncology  · 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 with multiple retroperitoneal, pelvic lymphadenopathy, bilateral adrenal gland involvement, bilateral lung involvement and left femur head involvement status post ORIF followed by radiation therapy he received 4 cycles of ipilimumab/nivolumab as of 11/01/2018 in [place] at [facility]  · Most recent PET scan on 05/23/2022 displayed favorable response involving the lungs, retroperitoneal, bilateral adrenal glands and primary in the right kidney  · Current treatment plan of nivolumab 480 mg every month and Xgeva every 3 months  · Patient moved from [place] 3 weeks ago for continued cancer chemotherapy treatment with most recent treatment 2 months ago in [place]  Patient was scheduled for first chemo treatment here today but not able to receive due to current state of health  Plans: In-patient consult to Oncology  Follow-up with your oncologist Dr Jessie Acuna as an outpatient after discharge  VTE Pharmacologic Prophylaxis: VTE Score: 6 High Risk (Score >/= 5) - Pharmacological DVT Prophylaxis Ordered: heparin  Sequential Compression Devices Ordered  Patient Centered Rounds: I performed bedside rounds with nursing staff today  Discussions with Specialists or Other Care Team Provider:  Oncology    Education and Discussions with Family / Patient: Attempted to update  (wife) via phone  Left voicemail  Current Length of Stay: 1 day(s)  Current Patient Status: Inpatient   Discharge Plan: Pending  Patient still needs to be treated for V[place] in the setting of nausea and diarrhea  likely DC 24-48 hours      Code Status: Level 3 - DNAR and DNI    Subjective:   Patient was lying comfortably in bed this morning  He was receiving IV fluids  He reports that he had 3 watery bowel movements last night and 1 bowel movement this morning  Did not notice any blood in stool  Denied abdominal pain, nausea, vomiting, fever, chills  He is on clear liquid diet  He wonders any chance of oncology consult today  Objective:     Vitals:   Temp (24hrs), Av 8 °F (36 6 °C), Min:97 4 °F (36 3 °C), Max:98 2 °F (36 8 °C)    Temp:  [97 4 °F (36 3 °C)-98 2 °F (36 8 °C)] 97 4 °F (36 3 °C)  HR:  [] 94  Resp:  [16-18] 17  BP: (116-148)/(69-82) 140/72  SpO2:  [96 %-99 %] 96 %  Body mass index is 22 63 kg/m²  Input and Output Summary (last 24 hours): Intake/Output Summary (Last 24 hours) at 2022 1253  Last data filed at 2022 0908  Gross per 24 hour   Intake 2280 ml   Output --   Net 2280 ml       Physical Exam:   Physical Exam  Vitals and nursing note reviewed  Constitutional:       General: He is not in acute distress  Appearance: He is well-developed  HENT:      Head: Normocephalic and atraumatic  Mouth/Throat:      Mouth: Mucous membranes are moist       Pharynx: Oropharynx is clear  Eyes:      Extraocular Movements: Extraocular movements intact  Conjunctiva/sclera: Conjunctivae normal       Pupils: Pupils are equal, round, and reactive to light  Cardiovascular:      Rate and Rhythm: Normal rate and regular rhythm  Heart sounds: Normal heart sounds  No murmur heard  Pulmonary:      Effort: Pulmonary effort is normal  No respiratory distress  Breath sounds: Normal breath sounds  No wheezing or rales  Abdominal:      General: Bowel sounds are normal  There is no distension  Palpations: Abdomen is soft  Tenderness: There is no abdominal tenderness  There is no guarding  Musculoskeletal:         General: No swelling  Cervical back: Neck supple  Right lower leg: No edema        Left lower leg: No edema  Skin:     General: Skin is warm and dry  Capillary Refill: Capillary refill takes less than 2 seconds  Coloration: Skin is not jaundiced or pale  Findings: No bruising or lesion  Neurological:      Mental Status: He is alert  Additional Data:     Labs:  Results from last 7 days   Lab Units 07/30/22  0500   WBC Thousand/uL 9 32   HEMOGLOBIN g/dL 13 0   HEMATOCRIT % 37 4   PLATELETS Thousands/uL 365   NEUTROS PCT % 74   LYMPHS PCT % 12*   MONOS PCT % 11   EOS PCT % 1     Results from last 7 days   Lab Units 07/30/22  0500 07/29/22  1840   SODIUM mmol/L 138 137   POTASSIUM mmol/L 3 3* 3 7   CHLORIDE mmol/L 115* 111*   CO2 mmol/L 15* 16*   BUN mg/dL 21 22   CREATININE mg/dL 1 31* 1 74*   ANION GAP mmol/L 8 10   CALCIUM mg/dL 7 3* 8 1*   ALBUMIN g/dL  --  3 8   TOTAL BILIRUBIN mg/dL  --  1 32*   ALK PHOS U/L  --  73   ALT U/L  --  36   AST U/L  --  32   GLUCOSE RANDOM mg/dL 107 133                 Results from last 7 days   Lab Units 07/30/22  0500 07/29/22  1840   PROCALCITONIN ng/ml 0 30* 0 38*       Lines/Drains:  Invasive Devices  Report    Peripheral Intravenous Line  Duration           Peripheral IV 07/29/22 Right Antecubital <1 day                      Imaging: No pertinent imaging reviewed    No orders to display       Recent Cultures (last 7 days):   Results from last 7 days   Lab Units 07/29/22  1919   C DIFF TOXIN B BY PCR  Negative       Last 24 Hours Medication List:   Current Facility-Administered Medications   Medication Dose Route Frequency Provider Last Rate    acetaminophen  650 mg Oral Q6H PRN LISSA Vera      aspirin  81 mg Oral Daily LISSA Vera      atenolol  25 mg Oral Daily LISSA Vera      calcium carbonate  1 tablet Oral BID With Meals LISSA Vera      cefTRIAXone  1,000 mg Intravenous Q24H May Addis Shaver MD 1,000 mg (07/30/22 1054)    cholecalciferol  400 Units Oral Daily LISSA Vera      heparin (porcine)  5,000 Units Subcutaneous [facility] LISSA Headley      lactated ringers  100 mL/hr Intravenous Continuous Hartmann'sKENDYNP 100 mL/hr (07/30/22 0908)    metroNIDAZOLE  500 mg Intravenous Q8H LISSA Winslow 500 mg (07/30/22 0510)    ondansetron  4 mg Intravenous Q6H PRN Hartmann's CRNP      pravastatin  10 mg Oral Daily Mary Kate'sLISSA          Today, Patient Was Seen By: Janelle Byrnes MD    **Please Note: This note may have been constructed using a voice recognition system  **

## 2022-07-30 NOTE — PLAN OF CARE
Problem: MOBILITY - ADULT  Goal: Maintain or return to baseline ADL function  Description: INTERVENTIONS:  -  Assess patient's ability to carry out ADLs; assess patient's baseline for ADL function and identify physical deficits which impact ability to perform ADLs (bathing, care of mouth/teeth, toileting, grooming, dressing, etc )  - Assess/evaluate cause of self-care deficits   - Assess range of motion  - Assess patient's mobility; develop plan if impaired  - Assess patient's need for assistive devices and provide as appropriate  - Encourage maximum independence but intervene and supervise when necessary  - Involve family in performance of ADLs  - Assess for home care needs following discharge   - Consider OT consult to assist with ADL evaluation and planning for discharge  - Provide patient education as appropriate  Outcome: Progressing  Goal: Maintains/Returns to pre admission functional level  Description: INTERVENTIONS:  - Perform BMAT or MOVE assessment daily    - Set and communicate daily mobility goal to care team and patient/family/caregiver  - Collaborate with rehabilitation services on mobility goals if consulted  - Perform Range of Motion  times a day  - Reposition patient every  hours    - Dangle patient  times a day  - Stand patient  times a day  - Ambulate patient  times a day  - Out of bed to chair  times a day   - Out of bed for meals  times a day  - Out of bed for toileting  - Record patient progress and toleration of activity level   Outcome: Progressing     Problem: PAIN - ADULT  Goal: Verbalizes/displays adequate comfort level or baseline comfort level  Description: Interventions:  - Encourage patient to monitor pain and request assistance  - Assess pain using appropriate pain scale  - Administer analgesics based on type and severity of pain and evaluate response  - Implement non-pharmacological measures as appropriate and evaluate response  - Consider cultural and social influences on pain and pain management  - Notify physician/advanced practitioner if interventions unsuccessful or patient reports new pain  Outcome: Progressing     Problem: INFECTION - ADULT  Goal: Absence or prevention of progression during hospitalization  Description: INTERVENTIONS:  - Assess and monitor for signs and symptoms of infection  - Monitor lab/diagnostic results  - Monitor all insertion sites, i e  indwelling lines, tubes, and drains  - Monitor endotracheal if appropriate and nasal secretions for changes in amount and color  - [place] appropriate cooling/warming therapies per order  - Administer medications as ordered  - Instruct and encourage patient and family to use good hand hygiene technique  - Identify and instruct in appropriate isolation precautions for identified infection/condition  Outcome: Progressing  Goal: Absence of fever/infection during neutropenic period  Description: INTERVENTIONS:  - Monitor WBC    Outcome: Progressing     Problem: SAFETY ADULT  Goal: Maintain or return to baseline ADL function  Description: INTERVENTIONS:  -  Assess patient's ability to carry out ADLs; assess patient's baseline for ADL function and identify physical deficits which impact ability to perform ADLs (bathing, care of mouth/teeth, toileting, grooming, dressing, etc )  - Assess/evaluate cause of self-care deficits   - Assess range of motion  - Assess patient's mobility; develop plan if impaired  - Assess patient's need for assistive devices and provide as appropriate  - Encourage maximum independence but intervene and supervise when necessary  - Involve family in performance of ADLs  - Assess for home care needs following discharge   - Consider OT consult to assist with ADL evaluation and planning for discharge  - Provide patient education as appropriate  Outcome: Progressing  Goal: Maintains/Returns to pre admission functional level  Description: INTERVENTIONS:  - Perform BMAT or MOVE assessment daily    - Set and communicate daily mobility goal to care team and patient/family/caregiver  - Collaborate with rehabilitation services on mobility goals if consulted  - Perform Range of Motion  times a day  - Reposition patient every  hours    - Dangle patient  times a day  - Stand patient  times a day  - Ambulate patient  times a day  - Out of bed to chair  times a day   - Out of bed for meals  times a day  - Out of bed for toileting  - Record patient progress and toleration of activity level   Outcome: Progressing  Goal: Patient will remain free of falls  Description: INTERVENTIONS:  - Educate patient/family on patient safety including physical limitations  - Instruct patient to call for assistance with activity   - Consult OT/PT to assist with strengthening/mobility   - Keep Call bell within reach  - Keep bed low and locked with side rails adjusted as appropriate  - Keep care items and personal belongings within reach  - Initiate and maintain comfort rounds  - Make Fall Risk Sign visible to staff  - Offer Toileting every  Hours, in advance of need  - Initiate/Maintain alarm  - Obtain necessary fall risk management equipment:  Apply yellow socks and bracelet for high fall risk patients  - Consider moving patient to room near nurses station  Outcome: Progressing     Problem: DISCHARGE PLANNING  Goal: Discharge to home or other facility with appropriate resources  Description: INTERVENTIONS:  - Identify barriers to discharge w/patient and caregiver  - Arrange for needed discharge resources and transportation as appropriate  - Identify discharge learning needs (meds, wound care, etc )  - Arrange for interpretive services to assist at discharge as needed  - Refer to Case Management Department for coordinating discharge planning if the patient needs post-hospital services based on physician/advanced practitioner order or complex needs related to functional status, cognitive ability, or social support system  Outcome: Progressing Problem: Knowledge Deficit  Goal: Patient/family/caregiver demonstrates understanding of disease process, treatment plan, medications, and discharge instructions  Description: Complete learning assessment and assess knowledge base    Interventions:  - Provide teaching at level of understanding  - Provide teaching via preferred learning methods  Outcome: Progressing

## 2022-07-30 NOTE — ASSESSMENT & PLAN NOTE
 Blood pressure is reviewed and acceptable   o Last recorded pressure:  116/69   Continue atenolol  Hold lisinopril in the setting of [place]   Monitor blood pressure

## 2022-07-30 NOTE — ASSESSMENT & PLAN NOTE
· Presentation:  Patient presents with complaints of continued episodes of diarrhea, nausea and vomiting for the past 3 weeks  Of note, patient moved from [place] 3 weeks ago for continued renal cancer chemotherapy treatment with most recent treatment 2 months ago  He denies hematemesis, hematochezia, melena, fever, chills or abdominal pain  · CT A/P on 07/25/2022: No peripancreatic fluid collections seen  No biliary dilation seen  Fluid-filled bowel loops throughout the abdomen without bowel obstruction  · Stool enteric bacterial panel and C diff sample pending  · IV hydration  · IV antiemetics  · Started on PO Flagyl on ED discharge on 07/25/2022  Continue IV Flagyl  · Procalcitonin 0 38, repeat in AM   · Clear liquid diet, advance as tolerated

## 2022-07-30 NOTE — ASSESSMENT & PLAN NOTE
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 with multiple retroperitoneal, pelvic lymphadenopathy, bilateral adrenal gland involvement, bilateral lung involvement and left femur head involvement status post ORIF followed by radiation therapy he received 4 cycles of ipilimumab/nivolumab as of 11/01/2018 in [place] at [facility]  · Most recent PET scan on 05/23/2022 displayed favorable response involving the lungs, retroperitoneal, bilateral adrenal glands and primary in the right kidney  · Current treatment plan of nivolumab 480 mg every month and Xgeva every 3 months  · Patient moved from [place] 3 weeks ago for continued cancer chemotherapy treatment with most recent treatment 2 months ago in [place]  Patient was scheduled for first chemo treatment here today but not able to receive due to current state of health  Plans: In-patient consult to Oncology  Follow-up with your oncologist Dr Octavia Hillman as an outpatient after discharge

## 2022-07-30 NOTE — PLAN OF CARE
Problem: MOBILITY - ADULT  Goal: Maintain or return to baseline ADL function  Description: INTERVENTIONS:  -  Assess patient's ability to carry out ADLs; assess patient's baseline for ADL function and identify physical deficits which impact ability to perform ADLs (bathing, care of mouth/teeth, toileting, grooming, dressing, etc )  - Assess/evaluate cause of self-care deficits   - Assess range of motion  - Assess patient's mobility; develop plan if impaired  - Assess patient's need for assistive devices and provide as appropriate  - Encourage maximum independence but intervene and supervise when necessary  - Involve family in performance of ADLs  - Assess for home care needs following discharge   - Consider OT consult to assist with ADL evaluation and planning for discharge  - Provide patient education as appropriate  Outcome: Progressing     Problem: PAIN - ADULT  Goal: Verbalizes/displays adequate comfort level or baseline comfort level  Description: Interventions:  - Encourage patient to monitor pain and request assistance  - Assess pain using appropriate pain scale  - Administer analgesics based on type and severity of pain and evaluate response  - Implement non-pharmacological measures as appropriate and evaluate response  - Consider cultural and social influences on pain and pain management  - Notify physician/advanced practitioner if interventions unsuccessful or patient reports new pain  Outcome: Progressing     Problem: INFECTION - ADULT  Goal: Absence or prevention of progression during hospitalization  Description: INTERVENTIONS:  - Assess and monitor for signs and symptoms of infection  - Monitor lab/diagnostic results  - Monitor all insertion sites, i e  indwelling lines, tubes, and drains  - Monitor endotracheal if appropriate and nasal secretions for changes in amount and color  - [place] appropriate cooling/warming therapies per order  - Administer medications as ordered  - Instruct and encourage patient and family to use good hand hygiene technique  - Identify and instruct in appropriate isolation precautions for identified infection/condition  Outcome: Progressing  Goal: Absence of fever/infection during neutropenic period  Description: INTERVENTIONS:  - Monitor WBC    Outcome: Progressing     Problem: SAFETY ADULT  Goal: Maintain or return to baseline ADL function  Description: INTERVENTIONS:  -  Assess patient's ability to carry out ADLs; assess patient's baseline for ADL function and identify physical deficits which impact ability to perform ADLs (bathing, care of mouth/teeth, toileting, grooming, dressing, etc )  - Assess/evaluate cause of self-care deficits   - Assess range of motion  - Assess patient's mobility; develop plan if impaired  - Assess patient's need for assistive devices and provide as appropriate  - Encourage maximum independence but intervene and supervise when necessary  - Involve family in performance of ADLs  - Assess for home care needs following discharge   - Consider OT consult to assist with ADL evaluation and planning for discharge  - Provide patient education as appropriate  Outcome: Progressing  Goal: Maintains/Returns to pre admission functional level  Description: INTERVENTIONS:  - Perform BMAT or MOVE assessment daily    - Set and communicate daily mobility goal to care team and patient/family/caregiver  - Collaborate with rehabilitation services on mobility goals if consulted  - Perform Range of Motion 3 times a day  - Reposition patient every 2 hours    - Dangle patient 3 times a day  - Stand patient 3 times a day  - Ambulate patient 3 times a day  - Out of bed to chair 3 times a day   - Out of bed for meals 3 times a day  - Out of bed for toileting  - Record patient progress and toleration of activity level   Outcome: Progressing  Goal: Patient will remain free of falls  Description: INTERVENTIONS:  - Educate patient/family on patient safety including physical limitations  - Instruct patient to call for assistance with activity   - Consult OT/PT to assist with strengthening/mobility   - Keep Call bell within reach  - Keep bed low and locked with side rails adjusted as appropriate  - Keep care items and personal belongings within reach  - Initiate and maintain comfort rounds  - Make Fall Risk Sign visible to staff  - Offer Toileting every 2 Hours, in advance of need  - Apply yellow socks and bracelet for high fall risk patients  - Consider moving patient to room near nurses station  Outcome: Progressing     Problem: DISCHARGE PLANNING  Goal: Discharge to home or other facility with appropriate resources  Description: INTERVENTIONS:  - Identify barriers to discharge w/patient and caregiver  - Arrange for needed discharge resources and transportation as appropriate  - Identify discharge learning needs (meds, wound care, etc )  - Arrange for interpretive services to assist at discharge as needed  - Refer to Case Management Department for coordinating discharge planning if the patient needs post-hospital services based on physician/advanced practitioner order or complex needs related to functional status, cognitive ability, or social support system  Outcome: Progressing     Problem: Knowledge Deficit  Goal: Patient/family/caregiver demonstrates understanding of disease process, treatment plan, medications, and discharge instructions  Description: Complete learning assessment and assess knowledge base    Interventions:  - Provide teaching at level of understanding  - Provide teaching via preferred learning methods  Outcome: Progressing

## 2022-07-30 NOTE — ASSESSMENT & PLAN NOTE
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 with multiple retroperitoneal, pelvic lymphadenopathy, bilateral adrenal gland involvement, bilateral lung involvement and left femur head involvement status post ORIF followed by radiation therapy he received 4 cycles of ipilimumab/nivolumab as of 11/01/2018 in [place] at [facility]  · Most recent PET scan on 05/23/2022 displayed favorable response involving the lungs, retroperitoneal, bilateral adrenal glands and primary in the right kidney  · Current treatment plan of nivolumab 480 mg every month and Xgeva every 3 months  · Patient moved from [place] 3 weeks ago for continued cancer chemotherapy treatment with most recent treatment 2 months ago in [place]  Patient was scheduled for first chemo treatment here today but not able to receive due to current state of health

## 2022-07-30 NOTE — ASSESSMENT & PLAN NOTE
Lab Results   Component Value Date    K 3 3 (L) 07/30/2022    K 3 7 07/29/2022      Repleted potassium 40 mEq oral 1 dose this morning  Monitor daily serum potassium and replete as needed

## 2022-07-30 NOTE — ASSESSMENT & PLAN NOTE
 Creatinine upon admission: 1 74  o Baseline: 0 9   Suspect prerenal secondary to N/V/D    Treatment: IV hydration   Avoid nephrotoxins and hypotension   Urinary tension protocol   Monitor BMP  [facility] consult

## 2022-07-31 LAB
ANION GAP SERPL CALCULATED.3IONS-SCNC: 8 MMOL/L (ref 4–13)
BUN SERPL-MCNC: 19 MG/DL (ref 5–25)
CALCIUM SERPL-MCNC: 7.3 MG/DL (ref 8.4–10.2)
CHLORIDE SERPL-SCNC: 114 MMOL/L (ref 96–108)
CO2 SERPL-SCNC: 18 MMOL/L (ref 21–32)
CREAT SERPL-MCNC: 1.08 MG/DL (ref 0.6–1.3)
ERYTHROCYTE [DISTWIDTH] IN BLOOD BY AUTOMATED COUNT: 13.8 % (ref 11.6–15.1)
GFR SERPL CREATININE-BSD FRML MDRD: 64 ML/MIN/1.73SQ M
GLUCOSE SERPL-MCNC: 105 MG/DL (ref 65–140)
HCT VFR BLD AUTO: 39.1 % (ref 36.5–49.3)
HGB BLD-MCNC: 13 G/DL (ref 12–17)
MAGNESIUM SERPL-MCNC: 1.9 MG/DL (ref 1.9–2.7)
MCH RBC QN AUTO: 31 PG (ref 26.8–34.3)
MCHC RBC AUTO-ENTMCNC: 33.2 G/DL (ref 31.4–37.4)
MCV RBC AUTO: 93 FL (ref 82–98)
PLATELET # BLD AUTO: 358 THOUSANDS/UL (ref 149–390)
PMV BLD AUTO: 8.6 FL (ref 8.9–12.7)
POTASSIUM SERPL-SCNC: 3 MMOL/L (ref 3.5–5.3)
RBC # BLD AUTO: 4.2 MILLION/UL (ref 3.88–5.62)
SODIUM SERPL-SCNC: 140 MMOL/L (ref 135–147)
WBC # BLD AUTO: 7.86 THOUSAND/UL (ref 4.31–10.16)

## 2022-07-31 PROCEDURE — 99232 SBSQ HOSP IP/OBS MODERATE 35: CPT | Performed by: INTERNAL MEDICINE

## 2022-07-31 PROCEDURE — 99222 1ST HOSP IP/OBS MODERATE 55: CPT | Performed by: INTERNAL MEDICINE

## 2022-07-31 PROCEDURE — 83735 ASSAY OF MAGNESIUM: CPT

## 2022-07-31 PROCEDURE — 85027 COMPLETE CBC AUTOMATED: CPT

## 2022-07-31 PROCEDURE — 80048 BASIC METABOLIC PNL TOTAL CA: CPT

## 2022-07-31 RX ORDER — MAGNESIUM SULFATE HEPTAHYDRATE 40 MG/ML
2 INJECTION, SOLUTION INTRAVENOUS ONCE
Status: COMPLETED | OUTPATIENT
Start: 2022-07-31 | End: 2022-07-31

## 2022-07-31 RX ORDER — CALCIUM GLUCONATE 20 MG/ML
1 INJECTION, SOLUTION INTRAVENOUS ONCE
Status: COMPLETED | OUTPATIENT
Start: 2022-07-31 | End: 2022-07-31

## 2022-07-31 RX ORDER — POTASSIUM CHLORIDE 20 MEQ/1
40 TABLET, EXTENDED RELEASE ORAL 2 TIMES DAILY
Status: DISCONTINUED | OUTPATIENT
Start: 2022-07-31 | End: 2022-08-02 | Stop reason: HOSPADM

## 2022-07-31 RX ORDER — POTASSIUM CHLORIDE 20 MEQ/1
40 TABLET, EXTENDED RELEASE ORAL ONCE
Status: COMPLETED | OUTPATIENT
Start: 2022-07-31 | End: 2022-07-31

## 2022-07-31 RX ADMIN — POTASSIUM CHLORIDE 40 MEQ: 1500 TABLET, EXTENDED RELEASE ORAL at 17:19

## 2022-07-31 RX ADMIN — CEFTRIAXONE 1000 MG: 1 INJECTION, SOLUTION INTRAVENOUS at 12:23

## 2022-07-31 RX ADMIN — CALCIUM GLUCONATE 1 G: 20 INJECTION, SOLUTION INTRAVENOUS at 14:31

## 2022-07-31 RX ADMIN — MAGNESIUM SULFATE HEPTAHYDRATE 2 G: 40 INJECTION, SOLUTION INTRAVENOUS at 09:18

## 2022-07-31 RX ADMIN — HEPARIN SODIUM 5000 UNITS: 5000 INJECTION INTRAVENOUS; SUBCUTANEOUS at 14:34

## 2022-07-31 RX ADMIN — METRONIDAZOLE 500 MG: 500 INJECTION, SOLUTION INTRAVENOUS at 04:49

## 2022-07-31 RX ADMIN — POTASSIUM CHLORIDE 40 MEQ: 1500 TABLET, EXTENDED RELEASE ORAL at 09:16

## 2022-07-31 RX ADMIN — HEPARIN SODIUM 5000 UNITS: 5000 INJECTION INTRAVENOUS; SUBCUTANEOUS at 22:21

## 2022-07-31 RX ADMIN — CHOLECALCIFEROL TAB 10 MCG (400 UNIT) 400 UNITS: 10 TAB at 09:11

## 2022-07-31 RX ADMIN — HEPARIN SODIUM 5000 UNITS: 5000 INJECTION INTRAVENOUS; SUBCUTANEOUS at 04:49

## 2022-07-31 RX ADMIN — CALCIUM 1 TABLET: 500 TABLET ORAL at 09:11

## 2022-07-31 RX ADMIN — ASPIRIN 81 MG CHEWABLE TABLET 81 MG: 81 TABLET CHEWABLE at 09:11

## 2022-07-31 RX ADMIN — ONDANSETRON 4 MG: 2 INJECTION INTRAMUSCULAR; INTRAVENOUS at 17:13

## 2022-07-31 RX ADMIN — POTASSIUM CHLORIDE 40 MEQ: 1500 TABLET, EXTENDED RELEASE ORAL at 06:21

## 2022-07-31 RX ADMIN — CALCIUM 1 TABLET: 500 TABLET ORAL at 17:20

## 2022-07-31 RX ADMIN — PRAVASTATIN SODIUM 10 MG: 10 TABLET ORAL at 09:11

## 2022-07-31 RX ADMIN — ATENOLOL 25 MG: 25 TABLET ORAL at 09:11

## 2022-07-31 NOTE — ASSESSMENT & PLAN NOTE
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 with multiple retroperitoneal, pelvic lymphadenopathy, bilateral adrenal gland involvement, bilateral lung involvement and left femur head involvement status post ORIF followed by radiation therapy he received 4 cycles of ipilimumab/nivolumab as of 11/01/2018 in [facility]  · Most recent PET scan on 05/23/2022 displayed favorable response involving the lungs, retroperitoneal, bilateral adrenal glands and primary in the right kidney  · Current treatment plan of nivolumab 480 mg every month and Xgeva every 3 months  · Patient moved from [place] 3 weeks ago for continued cancer chemotherapy treatment with most recent treatment 2 months ago in [facility]  Patient was scheduled for first chemo treatment here today but not able to receive due to current state of health  Plans: In-patient consult to Oncology  Follow-up with your oncologist Dr Magen Ng as an outpatient after discharge

## 2022-07-31 NOTE — ASSESSMENT & PLAN NOTE
· Presentation:  Patient presents with complaints of continued episodes of diarrhea, nausea and vomiting for the past 3 weeks  Of note, patient moved from [place] 3 weeks ago for continued renal cancer chemotherapy treatment with most recent treatment 2 months ago  He denies hematemesis, hematochezia, melena, fever, chills or abdominal pain  · CT A/P on 07/25/2022: No peripancreatic fluid collections seen  No biliary dilation seen  Fluid-filled bowel loops throughout the abdomen without bowel obstruction  · C diff negative, COVID negative  · Stool enteric bacterial panel pending  · Frequency and consistency of the diarrhea improving    Plan  Flagyl and Rocephin has been discontinued  Replete electrolytes as needed  A m   BMP

## 2022-07-31 NOTE — PROGRESS NOTES
[facility]  Progress Note Pepper Ying 1945, 76 y o  male MRN: [id number]  Unit/Bed#: S -01 Encounter: [id number]  Primary Care Provider: No primary care provider on file  Date and time admitted to hospital: 7/29/2022  6:04 PM    Hypokalemia  Assessment & Plan  Lab Results   Component Value Date    K 3 0 (L) 07/31/2022    K 3 3 (L) 07/30/2022      Monitor daily serum potassium and replete as needed  Plan  40 mg PO potassium t i d   A m  BMP  Contain to monitor electrolytes    Hypertension  Assessment & Plan     Continue atenolol   Monitor blood pressure  Nausea vomiting and diarrhea  Assessment & Plan  · Presentation:  Patient presents with complaints of continued episodes of diarrhea, nausea and vomiting for the past 3 weeks  Of note, patient moved from [place] 3 weeks ago for continued renal cancer chemotherapy treatment with most recent treatment 2 months ago  He denies hematemesis, hematochezia, melena, fever, chills or abdominal pain  · CT A/P on 07/25/2022: No peripancreatic fluid collections seen  No biliary dilation seen  Fluid-filled bowel loops throughout the abdomen without bowel obstruction  · C diff negative, COVID negative  · Stool enteric bacterial panel pending  · Frequency and consistency of the diarrhea improving    Plan  Flagyl and Rocephin has been discontinued  Replete electrolytes as needed  A m  BMP    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 [facility])  Assessment & Plan  · Patient has recently established care with Dr Carmelina Arrington of Hematology-Oncology    · 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 with multiple retroperitoneal, pelvic lymphadenopathy, bilateral adrenal gland involvement, bilateral lung involvement and left femur head involvement status post ORIF followed by radiation therapy he received 4 cycles of ipilimumab/nivolumab as of 11/01/2018 in [place] at [address]  · Most recent PET scan on 2022 displayed favorable response involving the lungs, retroperitoneal, bilateral adrenal glands and primary in the right kidney  · Current treatment plan of nivolumab 480 mg every month and Xgeva every 3 months  · Patient moved from [place] 3 weeks ago for continued cancer chemotherapy treatment with most recent treatment 2 months ago in [facility]  Patient was scheduled for first chemo treatment here today but not able to receive due to current state of health  Plans: In-patient consult to Oncology  Follow-up with your oncologist Dr Jojo Marinelli as an outpatient after discharge  * [place] (acute kidney injury) ([facility] [address] )  Assessment & Plan   Creatinine upon admission: 1 74  o Baseline: 0 9    Lab Results   Component Value Date    CREATININE 1 08 2022    CREATININE 1 31 (H) 2022    CREATININE 1 74 (H) 2022       Suspect prerenal secondary to N/V/D    Treatment:  Continue IV hydration   Avoid nephrotoxins and hypotension   Urinary tension protocol   Monitor BMP tomorrow a m  VTE Pharmacologic Prophylaxis: VTE Score: 6 Moderate Risk (Score 3-4) - Pharmacological DVT Prophylaxis Ordered: heparin  Patient Centered Rounds: I performed bedside rounds with nursing staff today  Discussions with Specialists or Other Care Team Provider: Hem Onc    Education and Discussions with Family / Patient: Attempted to update  (wife) via phone  Left voicemail  Current Length of Stay: 2 day(s)  Current Patient Status: Inpatient   Discharge Plan: Anticipate discharge in 24-48 hrs to home  Code Status: Level 3 - DNAR and DNI    Subjective:   Diarrhea is improving in frequency and consistency  Patient is able to tolerate clear liquids   Denies any chest pain, abdominal n/v     Objective:     Vitals:   Temp (24hrs), Av 9 °F (36 6 °C), Min:97 8 °F (36 6 °C), Max:98 1 °F (36 7 °C)    Temp:  [97 8 °F (36 6 °C)-98 1 °F (36 7 °C)] 98 °F (36 7 °C)  HR:  [73-81] 78  Resp:  [17-18] 18  BP: (123-149)/(68-77) 123/68  SpO2:  [96 %-97 %] 97 %  Body mass index is 22 46 kg/m²  Input and Output Summary (last 24 hours): Intake/Output Summary (Last 24 hours) at 7/31/2022 1240  Last data filed at 7/31/2022 0900  Gross per 24 hour   Intake 240 ml   Output --   Net 240 ml       Physical Exam:   Physical Exam  Vitals and nursing note reviewed  Constitutional:       Appearance: He is well-developed  HENT:      Head: Normocephalic and atraumatic  Eyes:      Conjunctiva/sclera: Conjunctivae normal    Cardiovascular:      Rate and Rhythm: Normal rate and regular rhythm  Heart sounds: No murmur heard  Pulmonary:      Effort: Pulmonary effort is normal  No respiratory distress  Breath sounds: Normal breath sounds  Abdominal:      Palpations: Abdomen is soft  Tenderness: There is no abdominal tenderness  Musculoskeletal:      Cervical back: Neck supple  Skin:     General: Skin is warm and dry  Neurological:      Mental Status: He is alert  Additional Data:     Labs:  Results from last 7 days   Lab Units 07/31/22  0445 07/30/22  0500   WBC Thousand/uL 7 86 9 32   HEMOGLOBIN g/dL 13 0 13 0   HEMATOCRIT % 39 1 37 4   PLATELETS Thousands/uL 358 365   NEUTROS PCT %  --  74   LYMPHS PCT %  --  12*   MONOS PCT %  --  11   EOS PCT %  --  1     Results from last 7 days   Lab Units 07/31/22  0445 07/30/22  0500 07/29/22  1840   SODIUM mmol/L 140   < > 137   POTASSIUM mmol/L 3 0*   < > 3 7   CHLORIDE mmol/L 114*   < > 111*   CO2 mmol/L 18*   < > 16*   BUN mg/dL 19   < > 22   CREATININE mg/dL 1 08   < > 1 74*   ANION GAP mmol/L 8   < > 10   CALCIUM mg/dL 7 3*   < > 8 1*   ALBUMIN g/dL  --   --  3 8   TOTAL BILIRUBIN mg/dL  --   --  1 32*   ALK PHOS U/L  --   --  73   ALT U/L  --   --  36   AST U/L  --   --  32   GLUCOSE RANDOM mg/dL 105   < > 133    < > = values in this interval not displayed                   Results from last 7 days   Lab Units 07/30/22  0500 07/29/22  1840   PROCALCITONIN ng/ml 0 30* 0 38*       Lines/Drains:  Invasive Devices  Report    Peripheral Intravenous Line  Duration           Peripheral IV 07/31/22 Left Antecubital <1 day                      Imaging: No pertinent imaging reviewed  Recent Cultures (last 7 days):   Results from last 7 days   Lab Units 07/29/22  1919   C DIFF TOXIN B BY PCR  Negative       Last 24 Hours Medication List:   Current Facility-Administered Medications   Medication Dose Route Frequency Provider Last Rate    acetaminophen  650 mg Oral Q6H PRN Cinthya Ora, KENDYNP      aspirin  81 mg Oral Daily Cinthya Ora, KENDYNP      atenolol  25 mg Oral Daily Cinthya Ora, CRNP      calcium carbonate  1 tablet Oral BID With Meals LISSA Soto      calcium chloride  1 g Intravenous Once Myah Chavarria MD      cholecalciferol  400 Units Oral Daily LISSA Soto      heparin (porcine)  5,000 Units Subcutaneous Q8H [facility] LISSA Headley      ondansetron  4 mg Intravenous Q6H PRN Cinthya Aguiara, KENDYNP      potassium chloride  40 mEq Oral BID Myah Chavarria MD      pravastatin  10 mg Oral Daily LISSA Soto          Today, Patient Was Seen By: Myah Chavarria MD    **Please Note: This note may have been constructed using a voice recognition system  **

## 2022-07-31 NOTE — CONSULTS
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  Diarrhea on nivolumab  History of Presenting Illness:  A 64-year-old gentleman with metastatic renal cell carcinoma, diagnosed in 2018  He has bone metastasis to the left femur  He had orthopedic procedure back then  He also had radiation therapy  He started ipilimumab and nivolumab of in 2018 for several doses followed by nivolumab monotherapy  He has good control of cancer  Recent CT scan showed no evidence of progression  However, since 3 weeks ago, she he has watery diarrhea, 3-5 times per day  He has occasional nausea and vomiting  He did not lose weight until 3 weeks ago  However, he lost some weight since diarrhea started  He has no abdominal pain  He has no respiratory symptoms  His performance status appeared to be 1/4 on the ECOG scale  C diff was negative  Stool pathogen were all negative  Review of Systems - As stated in the HPI otherwise the fourteen point review of systems was negative      Past Medical History:   Diagnosis Date    Hypertension     Kidney cancer, primary, 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 [facility])     MI (myocardial infarction) ([facility] )        Social History     Socioeconomic History    Marital status: /Civil Union     Spouse name: None    Number of children: None    Years of education: None    Highest education level: None   Occupational History    None   Tobacco Use    Smoking status: Never Smoker    Smokeless tobacco: Never Used   Substance and Sexual Activity    Alcohol use: Never    Drug use: Never    Sexual activity: Not Currently   Other Topics Concern    None   Social History Narrative    None     Social Determinants of Health     Financial Resource Strain: Not on file   Food Insecurity: Not on file   Transportation Needs: Not on file Physical Activity: Not on file   Stress: Not on file   Social Connections: Not on file   Intimate Partner Violence: Not on file   Housing Stability: Not on file       History reviewed  No pertinent family history      Allergies   Allergen Reactions    Latex Rash         Current Facility-Administered Medications:     acetaminophen (TYLENOL) tablet 650 mg, 650 mg, Oral, Q6H PRN, LISSA Nunes    aspirin chewable tablet 81 mg, 81 mg, Oral, Daily, LISSA Winslow, 81 mg at 07/31/22 0911    atenolol (TENORMIN) tablet 25 mg, 25 mg, Oral, Daily, LISSA Winslow, 25 mg at 07/31/22 0911    calcium carbonate (OYSTER SHELL,OSCAL) 500 mg tablet 1 tablet, 1 tablet, Oral, BID With Meals, LISSA Nunes, 1 tablet at 07/31/22 0911    cefTRIAXone (ROCEPHIN) IVPB (premix in dextrose) 1,000 mg 50 mL, 1,000 mg, Intravenous, Q24H, May u Sarah Marcus MD, Last Rate: 100 mL/hr at 07/30/22 1054, 1,000 mg at 07/30/22 1054    cholecalciferol (VITAMIN D3) tablet 400 Units, 400 Units, Oral, Daily, LISSA Winslow, 400 Units at 07/31/22 0911    heparin (porcine) subcutaneous injection 5,000 Units, 5,000 Units, Subcutaneous, Q8H [address], LISSA Winslow, 5,000 Units at 07/31/22 0449    magnesium sulfate 2 g/50 mL IVPB (premix) 2 g, 2 g, Intravenous, Once, Annika Warner MD, 2 g at 07/31/22 0918    metroNIDAZOLE (FLAGYL) IVPB (premix) 500 mg 100 mL, 500 mg, Intravenous, Q8H, LSISA Winslow, Last Rate: 200 mL/hr at 07/31/22 0449, 500 mg at 07/31/22 0449    ondansetron (ZOFRAN) injection 4 mg, 4 mg, Intravenous, Q6H PRN, LISSA Nunes, 4 mg at 07/30/22 1156    potassium chloride (K-DUR,KLOR-CON) CR tablet 40 mEq, 40 mEq, Oral, BID, Annika Warner MD, 40 mEq at 07/31/22 0916    pravastatin (PRAVACHOL) tablet 10 mg, 10 mg, Oral, Daily, LISSA Winslow, 10 mg at 07/31/22 0911      /68   Pulse 78   Temp 98 °F (36 7 °C)   Resp 18   Ht 5' 8" (1 727 m) Wt 67 kg (147 lb 11 3 oz)   SpO2 97%   BMI 22 46 kg/m²     General Appearance:    Alert, oriented        Eyes:    PERRL   Ears:    Normal external ear canals, both ears   Nose:   Nares normal, septum midline   Throat:   Mucosa moist  Pharynx without injection  Neck:   Supple       Lungs:     Clear to auscultation bilaterally   Chest Wall:    No tenderness or deformity    Heart:    Regular rate and rhythm       Abdomen:     Soft, non-tender, bowel sounds +, no organomegaly           Extremities:   Extremities no cyanosis or edema       Skin:   no rash or icterus      Lymph nodes:   Cervical, supraclavicular, and axillary nodes normal   Neurologic:   CNII-XII intact, normal strength, sensation and reflexes     Throughout               Recent Results (from the past 48 hour(s))   ECG 12 lead    Collection Time: 07/29/22  6:38 PM   Result Value Ref Range    Ventricular Rate 95 BPM    Atrial Rate 99 BPM    MA Interval 152 ms    QRSD Interval 76 ms    QT Interval 344 ms    QTC Interval 433 ms    P Axis 63 degrees    QRS Axis 61 degrees    T Wave Axis 34 degrees   CBC and differential    Collection Time: 07/29/22  6:40 PM   Result Value Ref Range    WBC 9 45 4 31 - 10 16 Thousand/uL    RBC 4 89 3 88 - 5 62 Million/uL    Hemoglobin 15 5 12 0 - 17 0 g/dL    Hematocrit 46 0 36 5 - 49 3 %    MCV 94 82 - 98 fL    MCH 31 7 26 8 - 34 3 pg    MCHC 33 7 31 4 - 37 4 g/dL    RDW 13 4 11 6 - 15 1 %    MPV 8 3 (L) 8 9 - 12 7 fL    Platelets 676 (H) 509 - 390 Thousands/uL    nRBC 0 /100 WBCs    Neutrophils Relative 78 (H) 43 - 75 %    Immat GRANS % 1 0 - 2 %    Lymphocytes Relative 12 (L) 14 - 44 %    Monocytes Relative 9 4 - 12 %    Eosinophils Relative 0 0 - 6 %    Basophils Relative 0 0 - 1 %    Neutrophils Absolute 7 37 1 85 - 7 62 Thousands/µL    Immature Grans Absolute 0 06 0 00 - 0 20 Thousand/uL    Lymphocytes Absolute 1 10 0 60 - 4 47 Thousands/µL    Monocytes Absolute 0 86 0 17 - 1 22 Thousand/µL    Eosinophils Absolute 0  02 0 00 - 0 61 Thousand/µL    Basophils Absolute 0 04 0 00 - 0 10 Thousands/µL   Comprehensive metabolic panel    Collection Time: 07/29/22  6:40 PM   Result Value Ref Range    Sodium 137 135 - 147 mmol/L    Potassium 3 7 3 5 - 5 3 mmol/L    Chloride 111 (H) 96 - 108 mmol/L    CO2 16 (L) 21 - 32 mmol/L    ANION GAP 10 4 - 13 mmol/L    BUN 22 5 - 25 mg/dL    Creatinine 1 74 (H) 0 60 - 1 30 mg/dL    Glucose 133 65 - 140 mg/dL    Calcium 8 1 (L) 8 4 - 10 2 mg/dL    AST 32 13 - 39 U/L    ALT 36 7 - 52 U/L    Alkaline Phosphatase 73 34 - 104 U/L    Total Protein 6 9 6 4 - 8 4 g/dL    Albumin 3 8 3 5 - 5 0 g/dL    Total Bilirubin 1 32 (H) 0 20 - 1 00 mg/dL    eGFR 36 ml/min/1 73sq m   HS Troponin 0hr (reflex protocol)    Collection Time: 07/29/22  6:40 PM   Result Value Ref Range    hs TnI 0hr 13 "Refer to ACS Flowchart"- see link ng/L   Magnesium    Collection Time: 07/29/22  6:40 PM   Result Value Ref Range    Magnesium 2 1 1 9 - 2 7 mg/dL   Procalcitonin    Collection Time: 07/29/22  6:40 PM   Result Value Ref Range    Procalcitonin 0 38 (H) <=0 25 ng/ml   Clostridium difficile toxin by PCR with EIA    Collection Time: 07/29/22  7:19 PM    Specimen: Per Rectum; Stool   Result Value Ref Range    C difficile toxin by PCR Negative Negative   Stool Enteric Bacterial Panel by PCR    Collection Time: 07/29/22  7:19 PM    Specimen: Per Rectum; Stool   Result Value Ref Range    Salmonella sp PCR None Detected None Detected    Shigella sp/Enteroinvasive E  coli (EIEC) PCR None Detected None Detected    Campylobacter sp (jejuni and coli) PCR None Detected None Detected    Shiga toxin 1/Shiga toxin 2 genes PCR None Detected None Detected   HS Troponin I 2hr    Collection Time: 07/29/22  9:01 PM   Result Value Ref Range    hs TnI 2hr 15 "Refer to ACS Flowchart"- see link ng/L    Delta 2hr hsTnI 2 <20 ng/L   COVID/FLU/RSV    Collection Time: 07/29/22  9:04 PM    Specimen: Nose; Nares   Result Value Ref Range    SARS-CoV-2 Negative Negative    INFLUENZA A PCR Negative Negative    INFLUENZA B PCR Negative Negative    RSV PCR Negative Negative   HS Troponin I 4hr    Collection Time: 07/29/22 11:11 PM   Result Value Ref Range    hs TnI 4hr 15 "Refer to ACS Flowchart"- see link ng/L    Delta 4hr hsTnI 2 <20 ng/L   Basic metabolic panel    Collection Time: 07/30/22  5:00 AM   Result Value Ref Range    Sodium 138 135 - 147 mmol/L    Potassium 3 3 (L) 3 5 - 5 3 mmol/L    Chloride 115 (H) 96 - 108 mmol/L    CO2 15 (L) 21 - 32 mmol/L    ANION GAP 8 4 - 13 mmol/L    BUN 21 5 - 25 mg/dL    Creatinine 1 31 (H) 0 60 - 1 30 mg/dL    Glucose 107 65 - 140 mg/dL    Calcium 7 3 (L) 8 4 - 10 2 mg/dL    eGFR 51 ml/min/1 73sq m   CBC and differential    Collection Time: 07/30/22  5:00 AM   Result Value Ref Range    WBC 9 32 4 31 - 10 16 Thousand/uL    RBC 4 01 3 88 - 5 62 Million/uL    Hemoglobin 13 0 12 0 - 17 0 g/dL    Hematocrit 37 4 36 5 - 49 3 %    MCV 93 82 - 98 fL    MCH 32 4 26 8 - 34 3 pg    MCHC 34 8 31 4 - 37 4 g/dL    RDW 13 7 11 6 - 15 1 %    MPV 8 6 (L) 8 9 - 12 7 fL    Platelets 889 112 - 741 Thousands/uL    nRBC 0 /100 WBCs    Neutrophils Relative 74 43 - 75 %    Immat GRANS % 1 0 - 2 %    Lymphocytes Relative 12 (L) 14 - 44 %    Monocytes Relative 11 4 - 12 %    Eosinophils Relative 1 0 - 6 %    Basophils Relative 1 0 - 1 %    Neutrophils Absolute 7 02 1 85 - 7 62 Thousands/µL    Immature Grans Absolute 0 06 0 00 - 0 20 Thousand/uL    Lymphocytes Absolute 1 12 0 60 - 4 47 Thousands/µL    Monocytes Absolute 1 00 0 17 - 1 22 Thousand/µL    Eosinophils Absolute 0 07 0 00 - 0 61 Thousand/µL    Basophils Absolute 0 05 0 00 - 0 10 Thousands/µL   Procalcitonin    Collection Time: 07/30/22  5:00 AM   Result Value Ref Range    Procalcitonin 0 30 (H) <=0 25 ng/ml   CBC    Collection Time: 07/31/22  4:45 AM   Result Value Ref Range    WBC 7 86 4 31 - 10 16 Thousand/uL    RBC 4 20 3 88 - 5 62 Million/uL    Hemoglobin 13 0 12 0 - 17 0 g/dL Hematocrit 39 1 36 5 - 49 3 %    MCV 93 82 - 98 fL    MCH 31 0 26 8 - 34 3 pg    MCHC 33 2 31 4 - 37 4 g/dL    RDW 13 8 11 6 - 15 1 %    Platelets 377 701 - 274 Thousands/uL    MPV 8 6 (L) 8 9 - 12 7 fL   Basic metabolic panel    Collection Time: 07/31/22  4:45 AM   Result Value Ref Range    Sodium 140 135 - 147 mmol/L    Potassium 3 0 (L) 3 5 - 5 3 mmol/L    Chloride 114 (H) 96 - 108 mmol/L    CO2 18 (L) 21 - 32 mmol/L    ANION GAP 8 4 - 13 mmol/L    BUN 19 5 - 25 mg/dL    Creatinine 1 08 0 60 - 1 30 mg/dL    Glucose 105 65 - 140 mg/dL    Calcium 7 3 (L) 8 4 - 10 2 mg/dL    eGFR 64 ml/min/1 73sq m   Magnesium    Collection Time: 07/31/22  8:01 AM   Result Value Ref Range    Magnesium 1 9 1 9 - 2 7 mg/dL         CT abdomen pelvis wo contrast    Result Date: 7/25/2022  Narrative: CT ABDOMEN AND PELVIS WITHOUT IV CONTRAST INDICATION:   Pancreatitis suspected r/o pancreatitis  Renal cancer COMPARISON:  Previous PET/CT from May 23, 2022 TECHNIQUE:  CT examination of the abdomen and pelvis was performed without intravenous contrast  Axial, sagittal, and coronal 2D reformatted images were created from the source data and submitted for interpretation  Radiation dose length product (DLP) for this visit:  335 mGy-cm   This examination, like all CT scans performed in the [facility], was performed utilizing techniques to minimize radiation dose exposure, including the use of iterative reconstruction and automated exposure control  Enteric contrast was administered  FINDINGS: ABDOMEN LOWER CHEST:  No clinically significant abnormality identified in the visualized lower chest  LIVER/BILIARY TREE:  Mild hepatic steatosis seen GALLBLADDER:  No calcified gallstones  No pericholecystic inflammatory change  Layering density within the gallbladder due to sludge SPLEEN:  Unremarkable  PANCREAS:  No pancreatic ductal dilation    No pancreatic atrophy ADRENAL GLANDS:  Left adrenal nodule seen which demonstrates attenuation of 59 Hounsfield unit  This is smaller since the previous study of  2018 this measures 1 4 cm, previously measuring 2 5 cm stable since previous PET scan from May 23, 2022 Right adrenal nodule remains unchanged KIDNEYS/URETERS:  No hydronephrosis seen The right perinephric region lesion seen on the previous study of 2018, smaller and visible as residual thickening of the left posterior pararenal fascia and left david of diaphragm, image 28 series 2 STOMACH AND BOWEL:  Diverticulosis seen Fluid-filled small bowel loops seen without evidence of bowel obstruction APPENDIX:  No findings to suggest appendicitis  ABDOMINOPELVIC CAVITY:  No ascites  No pneumoperitoneum  No lymphadenopathy  VESSELS:  No abdominal aortic aneurysm No retroperitoneal PELVIS REPRODUCTIVE ORGANS:  Prostate calcification seen URINARY BLADDER:  Unremarkable  ABDOMINAL WALL/INGUINAL REGIONS:  Unremarkable  OSSEOUS STRUCTURES:  No acute compression collapse vertebra Degenerative changes seen within the lumbar spine with spondylotic changes at L5-S1 Left hip arthroplasty seen Heterotopic ossification seen around the left hip joint A sclerotic lesion seen within the left inferior pubic ramus, stable from previous PET scan of May 23, 2022    Impression: No peripancreatic fluid collection seen No biliary dilation seen Fluid-filled bowel loops throughout the abdomen without bowel obstruction Workstation performed: [id number]       Assessment:  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  No evidence of progression of metastatic renal cell carcinoma  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  Plan:  A 57-year-old gentleman with history of 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  Since the diagnosis in 2018, he has been treated with checkpoint inhibitor  He is currently on nivolumab  He has diarrhea, 3-5 times per day since 3 weeks ago    So far, infectious workup for diarrhea were all negative including C diff  I have a relatively high suspicion that this is is checkpoint inhibitor induced diarrhea  However, this is the diagnosis of exclusion  I am going to ask GI specialist to help us to rule out other cause of diarrhea  If there is no other cause identified, I would strongly consider prednisone for immune mediated diarrhea  He is in agreement with my recommendations

## 2022-07-31 NOTE — PLAN OF CARE
Problem: MOBILITY - ADULT  Goal: Maintain or return to baseline ADL function  Description: INTERVENTIONS:  -  Assess patient's ability to carry out ADLs; assess patient's baseline for ADL function and identify physical deficits which impact ability to perform ADLs (bathing, care of mouth/teeth, toileting, grooming, dressing, etc )  - Assess/evaluate cause of self-care deficits   - Assess range of motion  - Assess patient's mobility; develop plan if impaired  - Assess patient's need for assistive devices and provide as appropriate  - Encourage maximum independence but intervene and supervise when necessary  - Involve family in performance of ADLs  - Assess for home care needs following discharge   - Consider OT consult to assist with ADL evaluation and planning for discharge  - Provide patient education as appropriate  Outcome: Progressing  Goal: Maintains/Returns to pre admission functional level  Description: INTERVENTIONS:  - Perform BMAT or MOVE assessment daily    - Set and communicate daily mobility goal to care team and patient/family/caregiver  - Collaborate with rehabilitation services on mobility goals if consulted  - Perform Range of Motion 3 times a day  - Reposition patient every 2 hours    - Dangle patient 3 times a day  - Stand patient 3 times a day  - Ambulate patient 3 times a day  - Out of bed to chair 3 times a day   - Out of bed for meals 3 times a day  - Out of bed for toileting  - Record patient progress and toleration of activity level   Outcome: Progressing     Problem: PAIN - ADULT  Goal: Verbalizes/displays adequate comfort level or baseline comfort level  Description: Interventions:  - Encourage patient to monitor pain and request assistance  - Assess pain using appropriate pain scale  - Administer analgesics based on type and severity of pain and evaluate response  - Implement non-pharmacological measures as appropriate and evaluate response  - Consider cultural and social influences on pain and pain management  - Notify physician/advanced practitioner if interventions unsuccessful or patient reports new pain  Outcome: Progressing     Problem: INFECTION - ADULT  Goal: Absence or prevention of progression during hospitalization  Description: INTERVENTIONS:  - Assess and monitor for signs and symptoms of infection  - Monitor lab/diagnostic results  - Monitor all insertion sites, i e  indwelling lines, tubes, and drains  - Monitor endotracheal if appropriate and nasal secretions for changes in amount and color  - [place] appropriate cooling/warming therapies per order  - Administer medications as ordered  - Instruct and encourage patient and family to use good hand hygiene technique  - Identify and instruct in appropriate isolation precautions for identified infection/condition  Outcome: Progressing  Goal: Absence of fever/infection during neutropenic period  Description: INTERVENTIONS:  - Monitor WBC    Outcome: Progressing     Problem: SAFETY ADULT  Goal: Maintain or return to baseline ADL function  Description: INTERVENTIONS:  -  Assess patient's ability to carry out ADLs; assess patient's baseline for ADL function and identify physical deficits which impact ability to perform ADLs (bathing, care of mouth/teeth, toileting, grooming, dressing, etc )  - Assess/evaluate cause of self-care deficits   - Assess range of motion  - Assess patient's mobility; develop plan if impaired  - Assess patient's need for assistive devices and provide as appropriate  - Encourage maximum independence but intervene and supervise when necessary  - Involve family in performance of ADLs  - Assess for home care needs following discharge   - Consider OT consult to assist with ADL evaluation and planning for discharge  - Provide patient education as appropriate  Outcome: Progressing     Problem: DISCHARGE PLANNING  Goal: Discharge to home or other facility with appropriate resources  Description: INTERVENTIONS:  - Identify barriers to discharge w/patient and caregiver  - Arrange for needed discharge resources and transportation as appropriate  - Identify discharge learning needs (meds, wound care, etc )  - Arrange for interpretive services to assist at discharge as needed  - Refer to Case Management Department for coordinating discharge planning if the patient needs post-hospital services based on physician/advanced practitioner order or complex needs related to functional status, cognitive ability, or social support system  Outcome: Progressing     Problem: Knowledge Deficit  Goal: Patient/family/caregiver demonstrates understanding of disease process, treatment plan, medications, and discharge instructions  Description: Complete learning assessment and assess knowledge base    Interventions:  - Provide teaching at level of understanding  - Provide teaching via preferred learning methods  Outcome: Progressing     Problem: Potential for Falls  Goal: Patient will remain free of falls  Description: INTERVENTIONS:  - Educate patient/family on patient safety including physical limitations  - Instruct patient to call for assistance with activity   - Consult OT/PT to assist with strengthening/mobility   - Keep Call bell within reach  - Keep bed low and locked with side rails adjusted as appropriate  - Keep care items and personal belongings within reach  - Initiate and maintain comfort rounds  - Make Fall Risk Sign visible to staff  - Offer Toileting every 2 Hours, in advance of need  - Apply yellow socks and bracelet for high fall risk patients  - Consider moving patient to room near nurses station  Outcome: Progressing

## 2022-07-31 NOTE — ASSESSMENT & PLAN NOTE
Lab Results   Component Value Date    K 3 0 (L) 07/31/2022    K 3 3 (L) 07/30/2022      Monitor daily serum potassium and replete as needed  Plan  40 mg PO potassium t i d   A m   BMP  Contain to monitor electrolytes

## 2022-07-31 NOTE — ASSESSMENT & PLAN NOTE
 Creatinine upon admission: 1 74  o Baseline: 0 9    Lab Results   Component Value Date    CREATININE 1 08 07/31/2022    CREATININE 1 31 (H) 07/30/2022    CREATININE 1 74 (H) 07/29/2022       Suspect prerenal secondary to N/V/D    Treatment:  Continue IV hydration   Avoid nephrotoxins and hypotension   Urinary tension protocol   Monitor BMP tomorrow paul Whaley

## 2022-08-01 ENCOUNTER — TELEPHONE (OUTPATIENT)
Dept: HEMATOLOGY ONCOLOGY | Facility: CLINIC | Age: 77
End: 2022-08-01

## 2022-08-01 PROBLEM — R11.2 NAUSEA & VOMITING: Status: ACTIVE | Noted: 2022-08-01

## 2022-08-01 LAB
ANION GAP SERPL CALCULATED.3IONS-SCNC: 8 MMOL/L (ref 4–13)
BUN SERPL-MCNC: 15 MG/DL (ref 5–25)
CALCIUM SERPL-MCNC: 8.1 MG/DL (ref 8.4–10.2)
CHLORIDE SERPL-SCNC: 115 MMOL/L (ref 96–108)
CO2 SERPL-SCNC: 17 MMOL/L (ref 21–32)
CREAT SERPL-MCNC: 1.05 MG/DL (ref 0.6–1.3)
GFR SERPL CREATININE-BSD FRML MDRD: 67 ML/MIN/1.73SQ M
GLUCOSE SERPL-MCNC: 131 MG/DL (ref 65–140)
MAGNESIUM SERPL-MCNC: 2.3 MG/DL (ref 1.9–2.7)
POTASSIUM SERPL-SCNC: 3.5 MMOL/L (ref 3.5–5.3)
SODIUM SERPL-SCNC: 140 MMOL/L (ref 135–147)

## 2022-08-01 PROCEDURE — 80048 BASIC METABOLIC PNL TOTAL CA: CPT

## 2022-08-01 PROCEDURE — 99232 SBSQ HOSP IP/OBS MODERATE 35: CPT | Performed by: INTERNAL MEDICINE

## 2022-08-01 PROCEDURE — 83735 ASSAY OF MAGNESIUM: CPT

## 2022-08-01 PROCEDURE — 99223 1ST HOSP IP/OBS HIGH 75: CPT | Performed by: INTERNAL MEDICINE

## 2022-08-01 RX ORDER — POLYETHYLENE GLYCOL 3350 17 G/17G
238 POWDER, FOR SOLUTION ORAL ONCE
Status: COMPLETED | OUTPATIENT
Start: 2022-08-01 | End: 2022-08-01

## 2022-08-01 RX ADMIN — PRAVASTATIN SODIUM 10 MG: 10 TABLET ORAL at 08:29

## 2022-08-01 RX ADMIN — ATENOLOL 25 MG: 25 TABLET ORAL at 08:28

## 2022-08-01 RX ADMIN — ASPIRIN 81 MG CHEWABLE TABLET 81 MG: 81 TABLET CHEWABLE at 08:28

## 2022-08-01 RX ADMIN — CALCIUM 1 TABLET: 500 TABLET ORAL at 08:29

## 2022-08-01 RX ADMIN — CALCIUM 1 TABLET: 500 TABLET ORAL at 16:53

## 2022-08-01 RX ADMIN — POTASSIUM CHLORIDE 40 MEQ: 1500 TABLET, EXTENDED RELEASE ORAL at 08:29

## 2022-08-01 RX ADMIN — CHOLECALCIFEROL TAB 10 MCG (400 UNIT) 400 UNITS: 10 TAB at 08:29

## 2022-08-01 RX ADMIN — POLYETHYLENE GLYCOL 3350 238 G: 17 POWDER, FOR SOLUTION ORAL at 16:54

## 2022-08-01 RX ADMIN — POTASSIUM CHLORIDE 40 MEQ: 1500 TABLET, EXTENDED RELEASE ORAL at 16:53

## 2022-08-01 RX ADMIN — HEPARIN SODIUM 5000 UNITS: 5000 INJECTION INTRAVENOUS; SUBCUTANEOUS at 05:41

## 2022-08-01 NOTE — PROGRESS NOTES
[facility]  Progress Note Janina Osler 1945, 76 y o  male MRN: [id number]  Unit/Bed#: S -01 Encounter: [id number]  Primary Care Provider: No primary care provider on file  Date and time admitted to hospital: 7/29/2022  6:04 PM    * Diarrhea  Assessment & Plan  · Presentation:  Patient presents with complaints of continued episodes of diarrhea, nausea and vomiting for the past 3 weeks  Of note, patient moved from [place] 3 weeks ago for continued renal cancer chemotherapy treatment with most recent treatment 2 months ago  He denies hematemesis, hematochezia, melena, fever, chills or abdominal pain  · CT A/P on 07/25/2022: No peripancreatic fluid collections seen  No biliary dilation seen  Fluid-filled bowel loops throughout the abdomen without bowel obstruction  · C diff negative, COVID negative  · Stool enteric bacterial panel pending  · Frequency and consistency of the diarrhea improving    Plan  Add metamucil p o  qd for diarrhea  Replete electrolytes as needed  A m  BMP  Appreciate GI input for management  [place] (acute kidney injury) ([facility] [address] )  Assessment & Plan   Creatinine upon admission: 1 74  o Baseline: 0 9    Lab Results   Component Value Date    CREATININE 1 05 08/01/2022    CREATININE 1 08 07/31/2022    CREATININE 1 31 (H) 07/30/2022       Creatinine baseline of 0 9 - 1 0, at admission was found to be 1 74    Suspect prerenal secondary to N/V/D      Plan   Continue po hydration as tolerated   Avoid nephrotoxins and hypotension   Urinary tension protocol   Monitor BMP tomorrow a m       Nausea & vomiting  Assessment & Plan  Plan  · Currently has PRN Zofran 4mg IV q6H for N/V    Hypokalemia  Assessment & Plan  Lab Results   Component Value Date    K 3 5 08/01/2022    K 3 0 (L) 07/31/2022        Plan  Continue Potassium 40mEq BID for hypokalemia  Magnesium level ordered, will supplement if necessary  A m   BMP    Hypertension  Assessment & Plan   Plan  o Continue Atenolol 25mg qd for HTN   o Hold if heart rate <05 bpm, or systolic <577 mmHg    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 [facility])  Assessment & Plan  · Patient has recently established care with Dr Camryn Beltran of Hematology-Oncology  · 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  · Most recent PET scan on 05/23/2022 displayed favorable response involving the lungs, retroperitoneal, bilateral adrenal glands and primary in the right kidney  · Current treatment plan of nivolumab 480 mg every month and Xgeva every 3 months  Plans: In-patient consult to Oncology  Follow-up with your oncologist Dr Camryn Beltran as an outpatient after discharge  VTE Pharmacologic Prophylaxis: VTE Score: 6 High Risk (Score >/= 5) - Pharmacological DVT Prophylaxis Ordered: heparin  Sequential Compression Devices Ordered  Patient Centered Rounds: I performed bedside rounds with nursing staff today  Discussions with Specialists or Other Care Team Provider: Continue follow up with oncology  Education and Discussions with Family / Patient: Attempted to update spouse at bedside, however she had stepped out  Patient states update is not necessary at this time        Current Length of Stay: 3 day(s)  Current Patient Status: Inpatient   Discharge Plan: [facility] is following this patient on consult  They are not yet medically stable for discharge secondary to continued diarrhea and hypokalemia       Code Status: Level 3 - DNAR and DNI    Subjective:   Patient presents as a 76year old male with a past medical history significant for Renal cell CA with metastases to lungs, adrenals, and L femur who is complaining of nausea and vomiting and frequent bouts of diarrhea for the past three weeks  Of note, the patient was previously seen in the ED on 22 for similar presentation but was unable to provide stool sample at that time and was instructed to follow up outpatient  The patient is still complaining of diarrhea, claiming he had 2-3 episodes last evening into this morning  The patient states the PRN Zofran he received at 1700 yesterday helped relieve his nausea and he was able to eat 3/4 of his dinner  He states he felt very good after this, but it was later follow by more diarrhea  The patient denies any other signs or symptoms at this time, and states that when he spoke with the oncologist they came to the conclusion that his diarrhea and N/V is most likely secondary to his chemotherapy  The patient states he did not receive much sleep last night due to the diarrhea, but otherwise has no complaints  He wishes to follow up outpatient with oncology after his electrolytes have been stabilized and his diarrhea has resolved  The patient was seen later in the AM with the rest of the treatment team at which time he endorsed another couple episodes of diarrhea  At this time the patient is interested in being seen by GI for potential colonoscopy later this week  The patient has no other complaints at this time  Objective:      Vitals:   Temp (24hrs), Av 9 °F (36 6 °C), Min:97 8 °F (36 6 °C), Max:98 °F (36 7 °C)    Temp:  [97 8 °F (36 6 °C)-98 °F (36 7 °C)] 98 °F (36 7 °C)  HR:  [69-79] 79  Resp:  [16] 16  BP: (122-145)/(65-82) 145/82  SpO2:  [95 %-98 %] 96 %  Body mass index is 22 46 kg/m²  Input and Output Summary (last 24 hours): Intake/Output Summary (Last 24 hours) at 2022 0826  Last data filed at 2022 0900  Gross per 24 hour   Intake 240 ml   Output --   Net 240 ml       Physical Exam:   Physical Exam  Vitals and nursing note reviewed  Cardiovascular:      Rate and Rhythm: Normal rate  Pulmonary:      Effort: Pulmonary effort is normal  No respiratory distress  Abdominal:      General: Abdomen is flat  There is no distension  Palpations: Abdomen is soft  There is no mass  Tenderness: There is no abdominal tenderness  There is no guarding or rebound  Skin:     General: Skin is warm and dry  Neurological:      Mental Status: He is alert and oriented to person, place, and time  Psychiatric:         Mood and Affect: Mood normal          Behavior: Behavior normal          Thought Content: Thought content normal          Judgment: Judgment normal           Additional Data:     Labs:  Results from last 7 days   Lab Units 07/31/22  0445 07/30/22  0500   WBC Thousand/uL 7 86 9 32   HEMOGLOBIN g/dL 13 0 13 0   HEMATOCRIT % 39 1 37 4   PLATELETS Thousands/uL 358 365   NEUTROS PCT %  --  74   LYMPHS PCT %  --  12*   MONOS PCT %  --  11   EOS PCT %  --  1     Results from last 7 days   Lab Units 08/01/22  0453 07/30/22  0500 07/29/22  1840   SODIUM mmol/L 140   < > 137   POTASSIUM mmol/L 3 5   < > 3 7   CHLORIDE mmol/L 115*   < > 111*   CO2 mmol/L 17*   < > 16*   BUN mg/dL 15   < > 22   CREATININE mg/dL 1 05   < > 1 74*   ANION GAP mmol/L 8   < > 10   CALCIUM mg/dL 8 1*   < > 8 1*   ALBUMIN g/dL  --   --  3 8   TOTAL BILIRUBIN mg/dL  --   --  1 32*   ALK PHOS U/L  --   --  73   ALT U/L  --   --  36   AST U/L  --   --  32   GLUCOSE RANDOM mg/dL 131   < > 133    < > = values in this interval not displayed  Results from last 7 days   Lab Units 07/30/22  0500 07/29/22  1840   PROCALCITONIN ng/ml 0 30* 0 38*       Lines/Drains:  Invasive Devices  Report    Peripheral Intravenous Line  Duration           Peripheral IV 07/31/22 Left Antecubital <1 day                      Imaging: No pertinent imaging reviewed      Recent Cultures (last 7 days):   Results from last 7 days   Lab Units 07/29/22  1919   C DIFF TOXIN B BY PCR Negative       Last 24 Hours Medication List:   Current Facility-Administered Medications   Medication Dose Route Frequency Provider Last Rate    acetaminophen  650 mg Oral Q6H PRN LISSA Hernandez      aspirin  81 mg Oral Daily LISSA Hernandez      atenolol  25 mg Oral Daily LISSA Hernandez      calcium carbonate  1 tablet Oral BID With Meals LISSA Hernandez      cholecalciferol  400 Units Oral Daily LISSA Hernandez      heparin (porcine)  5,000 Units Subcutaneous [facility] LISSA Headley      ondansetron  4 mg Intravenous Q6H PRN LISSA Hernandez      potassium chloride  40 mEq Oral BID Anusha Wasserman MD      pravastatin  10 mg Oral Daily LISSA Hernandez      psyllium  1 packet Oral Daily Shae Kwok DO          Today, Patient Was Seen By: Shae Kwok DO    **Please Note: This note may have been constructed using a voice recognition system  **

## 2022-08-01 NOTE — ASSESSMENT & PLAN NOTE
· Presentation:  Patient presents with complaints of continued episodes of diarrhea, nausea and vomiting for the past 3 weeks  Of note, patient moved from [place] 3 weeks ago for continued renal cancer chemotherapy treatment with most recent treatment 2 months ago  He denies hematemesis, hematochezia, melena, fever, chills or abdominal pain  · CT A/P on 07/25/2022: No peripancreatic fluid collections seen  No biliary dilation seen  Fluid-filled bowel loops throughout the abdomen without bowel obstruction  · C diff negative, COVID negative  · Stool enteric bacterial panel pending  · Frequency and consistency of the diarrhea improving    Plan  Add metamucil p o  qd for diarrhea  Replete electrolytes as needed  A m   BMP

## 2022-08-01 NOTE — ASSESSMENT & PLAN NOTE
 Creatinine upon admission: 1 74  o Baseline: 0 9    Lab Results   Component Value Date    CREATININE 1 05 08/01/2022    CREATININE 1 08 07/31/2022    CREATININE 1 31 (H) 07/30/2022       Creatinine baseline of 0 9 - 1 0, at admission was found to be 1 74    Suspect prerenal secondary to N/V/D      Plan   Continue po hydration as tolerated   Avoid nephrotoxins and hypotension   Urinary tension protocol   Monitor BMP tomorrow paul De Los Santos

## 2022-08-01 NOTE — H&P
Consultation - [facility]  Eugenia Chung 76 y o  male MRN: [id number]  Unit/Bed#: S -01 Encounter: [id number]        Consults    Reason for Consult / Principal Problem: evaluation of diarrhea in patient with renal cell carcinoma;  Rule out other causes on colonoscopy before initiated steroid therapy for immune mediated diarrhea    ASSESSMENT and PLAN:    Principal Problem:    Diarrhea  Active Problems:    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 [facility])    [place] (acute kidney injury) ([facility] [address] )    Hypertension    Hypokalemia    Nausea & vomiting    1  Diarrhea    Patient is 76year-old with history of metastatic renal cell carcinoma, currently on Nivolumab 480 mg q 28 days presenting with 5 week history of nausea/vomiting and watery non bloody diarrhea, associated with weight loss  No fever  No rectal bleeding, hematemesis or dysphagia  Physical exam remarkable for weight loss, no tenderness  Blood work significant for hypokalemia, elevated creatine and procalcitonin, and relative increase in WBC count  Patient not on blood thinners besides prophylatcic Heparin  Infectious diarrhea from enteric bacteria and Cdiff ruled out  Microangiopathic colitis or CMV colitis will be assessed after biopsy on colonoscopy    Plan  Prepare patient for colonoscopy tomorrow  - NPO from midnight, assess stools tomorrow for suitablity  - Bowel prep with Glcolax 238 g oral once mix with gatorade  Continue PRN Acetaminophen and Zofran  Monitor AM CBC for platelet count and HB  Replete electrolytes as needed      2  Primary malignancy of the right kidney with metastasis       As per primary team and Oncology    · Patient has recently established care with Dr Ryan Newton of Hematology-Oncology    · 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 with multiple retroperitoneal, pelvic lymphadenopathy, bilateral adrenal gland involvement, bilateral lung involvement and left femur head involvement status post ORIF followed by radiation therapy he received 4 cycles of ipilimumab/nivolumab as of 11/01/2018 in [place] at [facility]  · Most recent PET scan on 05/23/2022 displayed favorable response involving the lungs, retroperitoneal, bilateral adrenal glands and primary in the right kidney  · Current treatment plan of nivolumab 480 mg every month and Xgeva every 3 months       Plans: In-patient consult to Oncology  Follow-up with your oncologist Dr Roxane Chapa as an outpatient after discharge  3  Hypokalemia  Potasium today 3 5 meq    Continue Potassium 40mEq BID for hypokalemia  Magnesium level ordered, will supplement if necessary  A m  BMP        4  [place]  Nephrology and primary team   · Creatinine baseline of 0 9 - 1 0, at admission was found to be 1 74   · Suspect prerenal secondary to N/V/D     · Plan  · Continue po hydration as tolerated  · Avoid nephrotoxins and hypotension  · Urinary tension protocol  Monitor BMP tomorrow a m  Rosetta Le 5 Hypertension  · Plan  ? Continue Atenolol 25mg qd for HTN  ? 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 status post ORIF   He had orthopedic procedure back then  He also had radiation therapy  He started ipilimumab and nivolumab of in 2018 for several doses followed by nivolumab monotherapy  He has good control of cancer  Recent CT scan showed no evidence of progression  Of note, patient moved from [place] 3 weeks ago for continued renal cancer chemotherapy treatment and was due for an infusion last Friday  Patient describes symptoms started about 5 weeks with nausea and then vomiting   He had several episodes of prost prandial vomiting  He also describes having diarrhea started mild, a few episodes per day to having more frequent bowels, sometimes every 20 minutes  Diarrhea mostly liquid no blood or mucus, no associated fever, blood in stool, no hematemesis, no reflux, no dysphagia, globus sensation  Patient reports some weight loss especially with decrease oral intake and massive diarrhea  He took imodium without relief of diarrhea  He denies any genitourinary symptoms, no cough or chest pain  Patient has never had an endoscopy of colonoscopy done  reports presence of external hemorroids non bleeding,mostly non symptomatic  Denies alcohol use or tobacco use  Patient is not on any blood thinners at home    On arrival at the ED initial  Blood work showed Na 137---> 138, kK 3 7---->3 5 today, Creat 1 74 ---> 1 39--> 1 05, Tbil 1 32, Procalcitonin 0 38 --->0 30 wbc 9 45 WITH NEUTROPHILIA ---> 7 86  Stool enteric panel negative and CDIFF negative    CTAP ; No peripancreatic fluid collection seen  No biliary dilation seen  Fluid-filled bowel loops throughout the abdomen without bowel obstruction   Heme/onc team though diarrhea and vomiting are very much likely related to chemotherapy but however wanted to rule out other causes on colonoscopy before started steroids        REVIEW OF SYSTEMS:    CONSTITUTIONAL: Denies any fever, chills, or rigors  Good appetite, recent weight loss  HEENT: No earache or tinnitus  Denies hearing loss or visual disturbances  CARDIOVASCULAR: No chest pain or palpitations  RESPIRATORY: Denies any cough, hemoptysis, shortness of breath or dyspnea on exertion  GASTROINTESTINAL: As noted in the History of Present Illness  GENITOURINARY: No problems with urination  Denies any hematuria or dysuria  NEUROLOGIC: No dizziness or vertigo, denies headaches  MUSCULOSKELETAL: Denies any muscle or joint pain  SKIN: Denies skin rashes or itching     ENDOCRINE: Denies excessive thirst  Denies intolerance to heat or cold  PSYCHOSOCIAL: Denies depression or anxiety  Denies any recent memory loss  Historical Information   Past Medical History:   Diagnosis Date    Hypertension     Kidney cancer, primary, 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 [facility])     MI (myocardial infarction) ([facility] [address] )      History reviewed  No pertinent surgical history  Social History   Social History     Substance and Sexual Activity   Alcohol Use Never     Social History     Substance and Sexual Activity   Drug Use Never     Social History     Tobacco Use   Smoking Status Never Smoker   Smokeless Tobacco Never Used     History reviewed  No pertinent family history      Meds/Allergies     Medications Prior to Admission   Medication    aspirin 81 mg chewable tablet    atenolol (TENORMIN) 25 mg tablet    calcium carbonate (OS-LITO) 600 MG tablet    cholecalciferol (VITAMIN D3) 400 units tablet    denosumab (Xgeva) 120 mg/1 7 mL    lisinopril (ZESTRIL) 20 mg tablet    metroNIDAZOLE (FLAGYL) 500 mg tablet    ondansetron (Zofran ODT) 4 mg disintegrating tablet    pravastatin (PRAVACHOL) 10 mg tablet     Current Facility-Administered Medications   Medication Dose Route Frequency    acetaminophen (TYLENOL) tablet 650 mg  650 mg Oral Q6H PRN    aspirin chewable tablet 81 mg  81 mg Oral Daily    atenolol (TENORMIN) tablet 25 mg  25 mg Oral Daily    calcium carbonate (OYSTER SHELL,OSCAL) 500 mg tablet 1 tablet  1 tablet Oral BID With Meals    cholecalciferol (VITAMIN D3) tablet 400 Units  400 Units Oral Daily    heparin (porcine) subcutaneous injection 5,000 Units  5,000 Units Subcutaneous Q8H [facility]    ondansetron (ZOFRAN) injection 4 mg  4 mg Intravenous Q6H PRN    potassium chloride (K-DUR,KLOR-CON) CR tablet 40 mEq  40 mEq Oral BID    pravastatin (PRAVACHOL) tablet 10 mg  10 mg Oral Daily    psyllium (METAMUCIL) 1 packet  1 packet Oral Daily       Allergies   Allergen Reactions    Latex Rash Objective     Blood pressure 145/82, pulse 79, temperature 98 °F (36 7 °C), temperature source Oral, resp  rate 18, height 5' 8" (1 727 m), weight 67 kg (147 lb 11 3 oz), SpO2 96 %  Intake/Output Summary (Last 24 hours) at 8/1/2022 0908  Last data filed at 8/1/2022 0830  Gross per 24 hour   Intake 225 ml   Output --   Net 225 ml         PHYSICAL EXAM:      General Appearance:   Alert, cooperative, no distress, appears stated age    HEENT:   Normocephalic, atraumatic, anicteric, no oropharyngeal thrush present      Neck:  Supple, symmetrical, trachea midline, no adenopathy;    thyroid: no enlargement/tenderness/nodules; no carotid  bruit or JVD    Lungs:   Clear to auscultation bilaterally; no rales, rhonchi or wheezing; respirations unlabored    Heart[de-identified]   S1 and S2 normal; regular rate and rhythm; no murmur, rub, or gallop  Abdomen:   Soft, non-tender, non-distended; normal bowel sounds; no masses, no organomegaly    Genitalia:   Deferred    Rectal:   Deferred    Extremities:  No cyanosis, clubbing or edema    Pulses:  2+ and symmetric all extremities    Skin:  Skin color, texture, turgor normal, no rashes or lesions    Lymph nodes:  No palpable cervical, axillary or inguinal lymphadenopathy        Lab Results:   Results from last 7 days   Lab Units 07/31/22  0445 07/30/22  0500   WBC Thousand/uL 7 86 9 32   HEMOGLOBIN g/dL 13 0 13 0   HEMATOCRIT % 39 1 37 4   PLATELETS Thousands/uL 358 365   NEUTROS PCT %  --  74   LYMPHS PCT %  --  12*   MONOS PCT %  --  11   EOS PCT %  --  1     Results from last 7 days   Lab Units 08/01/22  0453 07/30/22  0500 07/29/22  1840   POTASSIUM mmol/L 3 5   < > 3 7   CHLORIDE mmol/L 115*   < > 111*   CO2 mmol/L 17*   < > 16*   BUN mg/dL 15   < > 22   CREATININE mg/dL 1 05   < > 1 74*   CALCIUM mg/dL 8 1*   < > 8 1*   ALK PHOS U/L  --   --  73   ALT U/L  --   --  36   AST U/L  --   --  32    < > = values in this interval not displayed           Results from last 7 days Lab Units 07/25/22  1157   LIPASE u/L 189*       Imaging Studies: I have personally reviewed pertinent imaging studies  No results found  Patient was seen and examined by Dr Margie Haynes  All perry medical decisions were made by Dr Margie Haynes  Thank you for allowing us to participate in the care of this present patient  We will follow-up with you closely

## 2022-08-01 NOTE — QUICK NOTE
Gastroenterology evaluation pending, if no other cause of diarrhea identified then prednisone for immune mediated diarrhea should be considered  Please see Dr Yanick De Jesus note for consultation done 7/31/22  Will follow up with patient after GI evaluation  If further questions/concerns overnight please contact on call provider Dr Yanick De Jesus

## 2022-08-01 NOTE — ASSESSMENT & PLAN NOTE
Lab Results   Component Value Date    K 3 5 08/01/2022    K 3 0 (L) 07/31/2022        Plan  Continue Potassium 40mEq BID for hypokalemia  Magnesium level ordered, will supplement if necessary  A m   BMP

## 2022-08-01 NOTE — ASSESSMENT & PLAN NOTE
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 with multiple retroperitoneal, pelvic lymphadenopathy, bilateral adrenal gland involvement, bilateral lung involvement and left femur head involvement status post ORIF followed by radiation therapy he received 4 cycles of ipilimumab/nivolumab as of 11/01/2018 in [facility]  · Most recent PET scan on 05/23/2022 displayed favorable response involving the lungs, retroperitoneal, bilateral adrenal glands and primary in the right kidney  · Current treatment plan of nivolumab 480 mg every month and Xgeva every 3 months  Plans: In-patient consult to Oncology  Follow-up with your oncologist Dr Brittany Cardozo as an outpatient after discharge

## 2022-08-01 NOTE — TELEPHONE ENCOUNTER
Spoke with wife and explained plan is for GI to rule out other causes for pt's symptoms and if negative will proceed with steroids  Explained treatment plan going forward will be discussed at follow up appt

## 2022-08-01 NOTE — PLAN OF CARE
Ongoing SW/CM Assessment/Plan of Care Note     See SW/CM flowsheets for goals and other objective data.    Patient/Family discharge goal (s):  Goal #1: Transportation arranged or issues addressed  Goal #2: Psychosocial needs assessed  Goal #3: Discharge to other institution(s) arranged    PT Recommendation:  Recommendation for Discharge: PT WI: Assisted living(return to assisted living)    OT Recommendation:  Recommendations for Discharge: OT WI: Home    SLP Recommendation:       Disposition:  Planned Discharge Destination: Rehabilitation/Skilled Care    Progress note:   DC planning. IV antibx changed. MAR faxed to [facility] for review. Updated admission liaison Marya at [facility] of the following. Wound care plan pending at this time. SW will continue to follow.    Problem: MOBILITY - ADULT  Goal: Maintain or return to baseline ADL function  Description: INTERVENTIONS:  -  Assess patient's ability to carry out ADLs; assess patient's baseline for ADL function and identify physical deficits which impact ability to perform ADLs (bathing, care of mouth/teeth, toileting, grooming, dressing, etc )  - Assess/evaluate cause of self-care deficits   - Assess range of motion  - Assess patient's mobility; develop plan if impaired  - Assess patient's need for assistive devices and provide as appropriate  - Encourage maximum independence but intervene and supervise when necessary  - Involve family in performance of ADLs  - Assess for home care needs following discharge   - Consider OT consult to assist with ADL evaluation and planning for discharge  - Provide patient education as appropriate  Outcome: Progressing  Goal: Maintains/Returns to pre admission functional level  Description: INTERVENTIONS:  - Perform BMAT or MOVE assessment daily    - Set and communicate daily mobility goal to care team and patient/family/caregiver  - Collaborate with rehabilitation services on mobility goals if consulted  - Perform Range of Motion 3 times a day  - Reposition patient every 2 hours    - Dangle patient 3 times a day  - Stand patient 3 times a day  - Ambulate patient 3 times a day  - Out of bed to chair 3 times a day   - Out of bed for meals 3 times a day  - Out of bed for toileting  - Record patient progress and toleration of activity level   Outcome: Progressing     Problem: PAIN - ADULT  Goal: Verbalizes/displays adequate comfort level or baseline comfort level  Description: Interventions:  - Encourage patient to monitor pain and request assistance  - Assess pain using appropriate pain scale  - Administer analgesics based on type and severity of pain and evaluate response  - Implement non-pharmacological measures as appropriate and evaluate response  - Consider cultural and social influences on pain and pain management  - Notify physician/advanced practitioner if interventions unsuccessful or patient reports new pain  Outcome: Progressing     Problem: INFECTION - ADULT  Goal: Absence or prevention of progression during hospitalization  Description: INTERVENTIONS:  - Assess and monitor for signs and symptoms of infection  - Monitor lab/diagnostic results  - Monitor all insertion sites, i e  indwelling lines, tubes, and drains  - Monitor endotracheal if appropriate and nasal secretions for changes in amount and color  - [place] appropriate cooling/warming therapies per order  - Administer medications as ordered  - Instruct and encourage patient and family to use good hand hygiene technique  - Identify and instruct in appropriate isolation precautions for identified infection/condition  Outcome: Progressing  Goal: Absence of fever/infection during neutropenic period  Description: INTERVENTIONS:  - Monitor WBC    Outcome: Progressing     Problem: SAFETY ADULT  Goal: Maintain or return to baseline ADL function  Description: INTERVENTIONS:  -  Assess patient's ability to carry out ADLs; assess patient's baseline for ADL function and identify physical deficits which impact ability to perform ADLs (bathing, care of mouth/teeth, toileting, grooming, dressing, etc )  - Assess/evaluate cause of self-care deficits   - Assess range of motion  - Assess patient's mobility; develop plan if impaired  - Assess patient's need for assistive devices and provide as appropriate  - Encourage maximum independence but intervene and supervise when necessary  - Involve family in performance of ADLs  - Assess for home care needs following discharge   - Consider OT consult to assist with ADL evaluation and planning for discharge  - Provide patient education as appropriate  Outcome: Progressing  Goal: Maintains/Returns to pre admission functional level  Description: INTERVENTIONS:  - Perform BMAT or MOVE assessment daily    - Set and communicate daily mobility goal to care team and patient/family/caregiver  - Collaborate with rehabilitation services on mobility goals if consulted  - Perform Range of Motion 3 times a day  - Reposition patient every 2 hours    - Dangle patient 3 times a day  - Stand patient 3 times a day  - Ambulate patient 3 times a day  - Out of bed to chair 3 times a day   - Out of bed for meals 3 times a day  - Out of bed for toileting  - Record patient progress and toleration of activity level   Outcome: Progressing  Goal: Patient will remain free of falls  Description: INTERVENTIONS:  - Educate patient/family on patient safety including physical limitations  - Instruct patient to call for assistance with activity   - Consult OT/PT to assist with strengthening/mobility   - Keep Call bell within reach  - Keep bed low and locked with side rails adjusted as appropriate  - Keep care items and personal belongings within reach  - Initiate and maintain comfort rounds  - Make Fall Risk Sign visible to staff  - Offer Toileting every 2 Hours, in advance of need  - Initiate/Maintain bed alarm  - Apply yellow socks and bracelet for high fall risk patients  - Consider moving patient to room near nurses station  Outcome: Progressing     Problem: DISCHARGE PLANNING  Goal: Discharge to home or other facility with appropriate resources  Description: INTERVENTIONS:  - Identify barriers to discharge w/patient and caregiver  - Arrange for needed discharge resources and transportation as appropriate  - Identify discharge learning needs (meds, wound care, etc )  - Arrange for interpretive services to assist at discharge as needed  - Refer to Case Management Department for coordinating discharge planning if the patient needs post-hospital services based on physician/advanced practitioner order or complex needs related to functional status, cognitive ability, or social support system  Outcome: Progressing     Problem: Knowledge Deficit  Goal: Patient/family/caregiver demonstrates understanding of disease process, treatment plan, medications, and discharge instructions  Description: Complete learning assessment and assess knowledge base    Interventions:  - Provide teaching at level of understanding  - Provide teaching via preferred learning methods  Outcome: Progressing     Problem: Potential for Falls  Goal: Patient will remain free of falls  Description: INTERVENTIONS:  - Educate patient/family on patient safety including physical limitations  - Instruct patient to call for assistance with activity   - Consult OT/PT to assist with strengthening/mobility   - Keep Call bell within reach  - Keep bed low and locked with side rails adjusted as appropriate  - Keep care items and personal belongings within reach  - Initiate and maintain comfort rounds  - Make Fall Risk Sign visible to staff  - Offer Toileting every 2 Hours, in advance of need  - Initiate/Maintain bed alarm  - Apply yellow socks and bracelet for high fall risk patients  - Consider moving patient to room near nurses station  Outcome: Progressing

## 2022-08-02 ENCOUNTER — ANESTHESIA (INPATIENT)
Dept: GASTROENTEROLOGY | Facility: HOSPITAL | Age: 77
DRG: 394 | End: 2022-08-02
Payer: MEDICARE

## 2022-08-02 ENCOUNTER — ANESTHESIA EVENT (INPATIENT)
Dept: GASTROENTEROLOGY | Facility: HOSPITAL | Age: 77
DRG: 394 | End: 2022-08-02
Payer: MEDICARE

## 2022-08-02 ENCOUNTER — APPOINTMENT (OUTPATIENT)
Dept: GASTROENTEROLOGY | Facility: HOSPITAL | Age: 77
DRG: 394 | End: 2022-08-02
Payer: MEDICARE

## 2022-08-02 VITALS
HEART RATE: 75 BPM | SYSTOLIC BLOOD PRESSURE: 127 MMHG | WEIGHT: 151.8 LBS | RESPIRATION RATE: 20 BRPM | TEMPERATURE: 97.4 F | DIASTOLIC BLOOD PRESSURE: 67 MMHG | OXYGEN SATURATION: 97 % | HEIGHT: 68 IN | BODY MASS INDEX: 23.01 KG/M2

## 2022-08-02 LAB
ANION GAP SERPL CALCULATED.3IONS-SCNC: 5 MMOL/L (ref 4–13)
BASOPHILS # BLD AUTO: 0.06 THOUSANDS/ΜL (ref 0–0.1)
BASOPHILS NFR BLD AUTO: 1 % (ref 0–1)
BUN SERPL-MCNC: 14 MG/DL (ref 5–25)
CALCIUM SERPL-MCNC: 7.7 MG/DL (ref 8.4–10.2)
CHLORIDE SERPL-SCNC: 111 MMOL/L (ref 96–108)
CO2 SERPL-SCNC: 21 MMOL/L (ref 21–32)
CREAT SERPL-MCNC: 1.05 MG/DL (ref 0.6–1.3)
EOSINOPHIL # BLD AUTO: 0.13 THOUSAND/ΜL (ref 0–0.61)
EOSINOPHIL NFR BLD AUTO: 2 % (ref 0–6)
ERYTHROCYTE [DISTWIDTH] IN BLOOD BY AUTOMATED COUNT: 14.1 % (ref 11.6–15.1)
GFR SERPL CREATININE-BSD FRML MDRD: 67 ML/MIN/1.73SQ M
GLUCOSE SERPL-MCNC: 117 MG/DL (ref 65–140)
HCT VFR BLD AUTO: 42.1 % (ref 36.5–49.3)
HGB BLD-MCNC: 14.2 G/DL (ref 12–17)
IMM GRANULOCYTES # BLD AUTO: 0.07 THOUSAND/UL (ref 0–0.2)
IMM GRANULOCYTES NFR BLD AUTO: 1 % (ref 0–2)
LYMPHOCYTES # BLD AUTO: 1.32 THOUSANDS/ΜL (ref 0.6–4.47)
LYMPHOCYTES NFR BLD AUTO: 16 % (ref 14–44)
MCH RBC QN AUTO: 31.5 PG (ref 26.8–34.3)
MCHC RBC AUTO-ENTMCNC: 33.7 G/DL (ref 31.4–37.4)
MCV RBC AUTO: 93 FL (ref 82–98)
MONOCYTES # BLD AUTO: 1.05 THOUSAND/ΜL (ref 0.17–1.22)
MONOCYTES NFR BLD AUTO: 13 % (ref 4–12)
NEUTROPHILS # BLD AUTO: 5.79 THOUSANDS/ΜL (ref 1.85–7.62)
NEUTS SEG NFR BLD AUTO: 67 % (ref 43–75)
NRBC BLD AUTO-RTO: 0 /100 WBCS
PLATELET # BLD AUTO: 359 THOUSANDS/UL (ref 149–390)
PMV BLD AUTO: 8.7 FL (ref 8.9–12.7)
POTASSIUM SERPL-SCNC: 3.6 MMOL/L (ref 3.5–5.3)
RBC # BLD AUTO: 4.51 MILLION/UL (ref 3.88–5.62)
SODIUM SERPL-SCNC: 137 MMOL/L (ref 135–147)
WBC # BLD AUTO: 8.42 THOUSAND/UL (ref 4.31–10.16)

## 2022-08-02 PROCEDURE — 88342 IMHCHEM/IMCYTCHM 1ST ANTB: CPT | Performed by: PATHOLOGY

## 2022-08-02 PROCEDURE — 0DBK8ZX EXCISION OF ASCENDING COLON, VIA NATURAL OR ARTIFICIAL OPENING ENDOSCOPIC, DIAGNOSTIC: ICD-10-PCS | Performed by: INTERNAL MEDICINE

## 2022-08-02 PROCEDURE — 88341 IMHCHEM/IMCYTCHM EA ADD ANTB: CPT | Performed by: PATHOLOGY

## 2022-08-02 PROCEDURE — 88305 TISSUE EXAM BY PATHOLOGIST: CPT | Performed by: PATHOLOGY

## 2022-08-02 PROCEDURE — 0DB58ZX EXCISION OF ESOPHAGUS, VIA NATURAL OR ARTIFICIAL OPENING ENDOSCOPIC, DIAGNOSTIC: ICD-10-PCS | Performed by: INTERNAL MEDICINE

## 2022-08-02 PROCEDURE — 99239 HOSP IP/OBS DSCHRG MGMT >30: CPT | Performed by: INTERNAL MEDICINE

## 2022-08-02 PROCEDURE — 80048 BASIC METABOLIC PNL TOTAL CA: CPT

## 2022-08-02 PROCEDURE — 45380 COLONOSCOPY AND BIOPSY: CPT | Performed by: INTERNAL MEDICINE

## 2022-08-02 PROCEDURE — 0DBM8ZX EXCISION OF DESCENDING COLON, VIA NATURAL OR ARTIFICIAL OPENING ENDOSCOPIC, DIAGNOSTIC: ICD-10-PCS | Performed by: INTERNAL MEDICINE

## 2022-08-02 PROCEDURE — NC001 PR NO CHARGE: Performed by: INTERNAL MEDICINE

## 2022-08-02 PROCEDURE — 88312 SPECIAL STAINS GROUP 1: CPT | Performed by: PATHOLOGY

## 2022-08-02 PROCEDURE — 88112 CYTOPATH CELL ENHANCE TECH: CPT | Performed by: PATHOLOGY

## 2022-08-02 PROCEDURE — 43239 EGD BIOPSY SINGLE/MULTIPLE: CPT | Performed by: INTERNAL MEDICINE

## 2022-08-02 PROCEDURE — 85025 COMPLETE CBC W/AUTO DIFF WBC: CPT

## 2022-08-02 RX ORDER — OMEPRAZOLE 40 MG/1
40 CAPSULE, DELAYED RELEASE ORAL 2 TIMES DAILY
Qty: 180 CAPSULE | Refills: 0 | Status: SHIPPED | OUTPATIENT
Start: 2022-08-02 | End: 2022-08-15

## 2022-08-02 RX ORDER — SIMETHICONE 20 MG/.3ML
EMULSION ORAL CODE/TRAUMA/SEDATION MEDICATION
Status: COMPLETED | OUTPATIENT
Start: 2022-08-02 | End: 2022-08-02

## 2022-08-02 RX ORDER — EPHEDRINE SULFATE 50 MG/ML
INJECTION INTRAVENOUS AS NEEDED
Status: DISCONTINUED | OUTPATIENT
Start: 2022-08-02 | End: 2022-08-02

## 2022-08-02 RX ORDER — SODIUM CHLORIDE, SODIUM LACTATE, POTASSIUM CHLORIDE, CALCIUM CHLORIDE 600; 310; 30; 20 MG/100ML; MG/100ML; MG/100ML; MG/100ML
INJECTION, SOLUTION INTRAVENOUS CONTINUOUS PRN
Status: DISCONTINUED | OUTPATIENT
Start: 2022-08-02 | End: 2022-08-02

## 2022-08-02 RX ORDER — CHOLESTYRAMINE 4 G/9G
1 POWDER, FOR SUSPENSION ORAL 2 TIMES DAILY WITH MEALS
Qty: 60 PACKET | Refills: 0 | Status: SHIPPED | OUTPATIENT
Start: 2022-08-02 | End: 2022-08-15

## 2022-08-02 RX ORDER — PROPOFOL 10 MG/ML
INJECTION, EMULSION INTRAVENOUS CONTINUOUS PRN
Status: DISCONTINUED | OUTPATIENT
Start: 2022-08-02 | End: 2022-08-02

## 2022-08-02 RX ORDER — PROPOFOL 10 MG/ML
INJECTION, EMULSION INTRAVENOUS AS NEEDED
Status: DISCONTINUED | OUTPATIENT
Start: 2022-08-02 | End: 2022-08-02

## 2022-08-02 RX ADMIN — CALCIUM 1 TABLET: 500 TABLET ORAL at 10:08

## 2022-08-02 RX ADMIN — PROPOFOL 40 MG: 10 INJECTION, EMULSION INTRAVENOUS at 14:38

## 2022-08-02 RX ADMIN — PRAVASTATIN SODIUM 10 MG: 10 TABLET ORAL at 10:05

## 2022-08-02 RX ADMIN — PROPOFOL 70 MCG/KG/MIN: 10 INJECTION, EMULSION INTRAVENOUS at 14:43

## 2022-08-02 RX ADMIN — PROPOFOL 30 MG: 10 INJECTION, EMULSION INTRAVENOUS at 14:41

## 2022-08-02 RX ADMIN — POTASSIUM CHLORIDE 40 MEQ: 1500 TABLET, EXTENDED RELEASE ORAL at 10:07

## 2022-08-02 RX ADMIN — PROPOFOL 50 MG: 10 INJECTION, EMULSION INTRAVENOUS at 14:37

## 2022-08-02 RX ADMIN — ATENOLOL 25 MG: 25 TABLET ORAL at 10:05

## 2022-08-02 RX ADMIN — Medication 40 MG: at 14:58

## 2022-08-02 RX ADMIN — CHOLECALCIFEROL TAB 10 MCG (400 UNIT) 400 UNITS: 10 TAB at 10:08

## 2022-08-02 RX ADMIN — ASPIRIN 81 MG CHEWABLE TABLET 81 MG: 81 TABLET CHEWABLE at 10:05

## 2022-08-02 RX ADMIN — EPHEDRINE SULFATE 5 MG: 50 INJECTION, SOLUTION INTRAVENOUS at 14:49

## 2022-08-02 RX ADMIN — SODIUM CHLORIDE, SODIUM LACTATE, POTASSIUM CHLORIDE, AND CALCIUM CHLORIDE: .6; .31; .03; .02 INJECTION, SOLUTION INTRAVENOUS at 14:34

## 2022-08-02 NOTE — ASSESSMENT & PLAN NOTE
 Creatinine upon admission: 1 74  o Baseline: 0 9    Lab Results   Component Value Date    CREATININE 1 05 08/02/2022    CREATININE 1 05 08/01/2022    CREATININE 1 08 07/31/2022       Creatinine baseline of 0 9 - 1 0, at admission was found to be 1 74    Suspect prerenal secondary to N/V/D      Plan   Continue po hydration as tolerated   Avoid nephrotoxins and hypotension   Urinary tension protocol

## 2022-08-02 NOTE — PLAN OF CARE
Problem: MOBILITY - ADULT  Goal: Maintain or return to baseline ADL function  Description: INTERVENTIONS:  -  Assess patient's ability to carry out ADLs; assess patient's baseline for ADL function and identify physical deficits which impact ability to perform ADLs (bathing, care of mouth/teeth, toileting, grooming, dressing, etc )  - Assess/evaluate cause of self-care deficits   - Assess range of motion  - Assess patient's mobility; develop plan if impaired  - Assess patient's need for assistive devices and provide as appropriate  - Encourage maximum independence but intervene and supervise when necessary  - Involve family in performance of ADLs  - Assess for home care needs following discharge   - Consider OT consult to assist with ADL evaluation and planning for discharge  - Provide patient education as appropriate  Outcome: Progressing  Goal: Maintains/Returns to pre admission functional level  Description: INTERVENTIONS:  - Perform BMAT or MOVE assessment daily    - Set and communicate daily mobility goal to care team and patient/family/caregiver  - Collaborate with rehabilitation services on mobility goals if consulted  - Perform Range of Motion 3 times a day  - Reposition patient every 2 hours    - Dangle patient 3 times a day  - Stand patient 3 times a day  - Ambulate patient 3 times a day  - Out of bed to chair 3 times a day   - Out of bed for meals 3 times a day  - Out of bed for toileting  - Record patient progress and toleration of activity level   Outcome: Progressing     Problem: PAIN - ADULT  Goal: Verbalizes/displays adequate comfort level or baseline comfort level  Description: Interventions:  - Encourage patient to monitor pain and request assistance  - Assess pain using appropriate pain scale  - Administer analgesics based on type and severity of pain and evaluate response  - Implement non-pharmacological measures as appropriate and evaluate response  - Consider cultural and social influences on pain and pain management  - Notify physician/advanced practitioner if interventions unsuccessful or patient reports new pain  Outcome: Progressing     Problem: INFECTION - ADULT  Goal: Absence or prevention of progression during hospitalization  Description: INTERVENTIONS:  - Assess and monitor for signs and symptoms of infection  - Monitor lab/diagnostic results  - Monitor all insertion sites, i e  indwelling lines, tubes, and drains  - Monitor endotracheal if appropriate and nasal secretions for changes in amount and color  - [place] appropriate cooling/warming therapies per order  - Administer medications as ordered  - Instruct and encourage patient and family to use good hand hygiene technique  - Identify and instruct in appropriate isolation precautions for identified infection/condition  Outcome: Progressing  Goal: Absence of fever/infection during neutropenic period  Description: INTERVENTIONS:  - Monitor WBC    Outcome: Progressing     Problem: SAFETY ADULT  Goal: Maintain or return to baseline ADL function  Description: INTERVENTIONS:  -  Assess patient's ability to carry out ADLs; assess patient's baseline for ADL function and identify physical deficits which impact ability to perform ADLs (bathing, care of mouth/teeth, toileting, grooming, dressing, etc )  - Assess/evaluate cause of self-care deficits   - Assess range of motion  - Assess patient's mobility; develop plan if impaired  - Assess patient's need for assistive devices and provide as appropriate  - Encourage maximum independence but intervene and supervise when necessary  - Involve family in performance of ADLs  - Assess for home care needs following discharge   - Consider OT consult to assist with ADL evaluation and planning for discharge  - Provide patient education as appropriate  Outcome: Progressing  Goal: Maintains/Returns to pre admission functional level  Description: INTERVENTIONS:  - Perform BMAT or MOVE assessment daily    - Set and communicate daily mobility goal to care team and patient/family/caregiver  - Collaborate with rehabilitation services on mobility goals if consulted  - Perform Range of Motion 3 times a day  - Reposition patient every 2 hours    - Dangle patient 3 times a day  - Stand patient 3 times a day  - Ambulate patient 3 times a day  - Out of bed to chair 3 times a day   - Out of bed for meals 3 times a day  - Out of bed for toileting  - Record patient progress and toleration of activity level   Outcome: Progressing  Goal: Patient will remain free of falls  Description: INTERVENTIONS:  - Educate patient/family on patient safety including physical limitations  - Instruct patient to call for assistance with activity   - Consult OT/PT to assist with strengthening/mobility   - Keep Call bell within reach  - Keep bed low and locked with side rails adjusted as appropriate  - Keep care items and personal belongings within reach  - Initiate and maintain comfort rounds  - Make Fall Risk Sign visible to staff  - Offer Toileting every 3 Hours, in advance of need  - Initiate/Maintain bed alarm  - Apply yellow socks and bracelet for high fall risk patients  - Consider moving patient to room near nurses station  Outcome: Progressing     Problem: DISCHARGE PLANNING  Goal: Discharge to home or other facility with appropriate resources  Description: INTERVENTIONS:  - Identify barriers to discharge w/patient and caregiver  - Arrange for needed discharge resources and transportation as appropriate  - Identify discharge learning needs (meds, wound care, etc )  - Arrange for interpretive services to assist at discharge as needed  - Refer to Case Management Department for coordinating discharge planning if the patient needs post-hospital services based on physician/advanced practitioner order or complex needs related to functional status, cognitive ability, or social support system  Outcome: Progressing     Problem: Knowledge Deficit  Goal: Patient/family/caregiver demonstrates understanding of disease process, treatment plan, medications, and discharge instructions  Description: Complete learning assessment and assess knowledge base    Interventions:  - Provide teaching at level of understanding  - Provide teaching via preferred learning methods  Outcome: Progressing     Problem: Potential for Falls  Goal: Patient will remain free of falls  Description: INTERVENTIONS:  - Educate patient/family on patient safety including physical limitations  - Instruct patient to call for assistance with activity   - Consult OT/PT to assist with strengthening/mobility   - Keep Call bell within reach  - Keep bed low and locked with side rails adjusted as appropriate  - Keep care items and personal belongings within reach  - Initiate and maintain comfort rounds  - Make Fall Risk Sign visible to staff  - Offer Toileting every 2 Hours, in advance of need  - Initiate/Maintain bed alarm  - Apply yellow socks and bracelet for high fall risk patients  - Consider moving patient to room near nurses station  Outcome: Progressing

## 2022-08-02 NOTE — ASSESSMENT & PLAN NOTE
· Presentation:  Patient presents with complaints of continued episodes of diarrhea, nausea and vomiting for the past 3 weeks  Of note, patient moved from [place] 3 weeks ago for continued renal cancer chemotherapy treatment with most recent treatment 2 months ago  He denies hematemesis, hematochezia, melena, fever, chills or abdominal pain  · CT A/P on 07/25/2022: No peripancreatic fluid collections seen  No biliary dilation seen  Fluid-filled bowel loops throughout the abdomen without bowel obstruction  · C diff negative, COVID negative, bacterial panel negative  · Frequency and consistency of the diarrhea improving    Plan  Added Metamucil po  Replete electrolytes as needed  Colonoscopy today per GI

## 2022-08-02 NOTE — PROGRESS NOTES
[facility]  Progress Note Lizabeth Madrigal 1945, 76 y o  male MRN: [id number]  Unit/Bed#: S -01 Encounter: [id number]  Primary Care Provider: No primary care provider on file  Date and time admitted to hospital: 7/29/2022  6:04 PM    * Diarrhea  Assessment & Plan  · Presentation:  Patient presents with complaints of continued episodes of diarrhea, nausea and vomiting for the past 3 weeks  Of note, patient moved from [place] 3 weeks ago for continued renal cancer chemotherapy treatment with most recent treatment 2 months ago  He denies hematemesis, hematochezia, melena, fever, chills or abdominal pain  · CT A/P on 07/25/2022: No peripancreatic fluid collections seen  No biliary dilation seen  Fluid-filled bowel loops throughout the abdomen without bowel obstruction  · C diff negative, COVID negative, bacterial panel negative  · Frequency and consistency of the diarrhea improving    Plan  Added Metamucil po  Replete electrolytes as needed  Colonoscopy today per GI  VIRGINIA (acute kidney injury) ([facility] [address] )  Assessment & Plan   Creatinine upon admission: 1 74  o Baseline: 0 9    Lab Results   Component Value Date    CREATININE 1 05 08/02/2022    CREATININE 1 05 08/01/2022    CREATININE 1 08 07/31/2022       Creatinine baseline of 0 9 - 1 0, at admission was found to be 1 74    Suspect prerenal secondary to N/V/D      Plan   Continue po hydration as tolerated   Avoid nephrotoxins and hypotension   Urinary tension protocol  Nausea & vomiting  Assessment & Plan  Plan  · Currently has PRN Zofran 4mg IV for N/V    Hypokalemia  Assessment & Plan  Lab Results   Component Value Date    K 3 6 08/02/2022    K 3 5 08/01/2022        Plan  Continue Potassium 40mEq BID for hypokalemia  Magnesium level 8/1/22 was 2 3  Potassium this AM has resolved, 3 6      Hypertension  Assessment & Plan   Plan  o Continue Atenolol 25mg qd for HTN   o Hold if heart rate <14 bpm, or systolic <385 mmHg    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  · Most recent PET scan on 05/23/2022 displayed favorable response involving the lungs, retroperitoneal, bilateral adrenal glands and primary in the right kidney  · Current treatment plan of nivolumab 480 mg every month and Xgeva every 3 months  Plans: In-patient consult to Oncology  Follow-up with your oncologist Dr Carmelina Arrington as an outpatient after discharge  VTE Pharmacologic Prophylaxis: VTE Score: 6 High Risk (Score >/= 5) - Pharmacological DVT Prophylaxis Ordered: heparin  Sequential Compression Devices Ordered  Patient Centered Rounds: I performed bedside rounds with nursing staff today  Discussions with Specialists or Other Care Team Provider: Follow up with Oncology outpatient, GI planning colonoscopy today, patient can take Immodium for his diarrhea as outpatient  Education and Discussions with Family / Patient: Patient declined call to   Current Length of Stay: 4 day(s)  Current Patient Status: Inpatient   Discharge Plan: Anticipate discharge later today or tomorrow to home  Code Status: Level 3 - DNAR and DNI    Subjective:   Patient is a 76year old male with a history of renal cell carcinoma with metastases, HTN, and MI who initially presented with diarrhea and associated N/V for three weeks duration   The patient was found to have an VIRGINIA at this time, most likely secondary to his symptoms which has since resolved  Infectious causes of diarrhea were ruled out, and GI advised that the patient may take Immodium outpatient for symptom control  The patient is new to the area and has been seen by Oncology whom he will follow up outpatient as well as GI who are planning to perform colonoscopy today  Per oncology, it is highly likely that the patient's symptoms are related to his chemotherapeutics, for which he is taking nivolumab once monthly, Xgeva every three months  Today, the patient was seen at the bedside and is alert, oriented, calm, and pleasant in demeanor  The patient endorses following his prep instructions for colonoscopy and endorses some diarrhea  The patient was advised that this is expected with the bowel preparation  The patient has no new complaints at this time and states he is ready to be discharged to follow up outpatient once his colonoscopy has been completed  Of note, the patient has moist mucous membranes and does not appear overtly dry  Objective:     Vitals:   Temp (24hrs), Av °F (36 7 °C), Min:97 7 °F (36 5 °C), Max:98 4 °F (36 9 °C)    Temp:  [97 7 °F (36 5 °C)-98 4 °F (36 9 °C)] 97 7 °F (36 5 °C)  HR:  [69-75] 74  Resp:  [17-20] 20  BP: (124-130)/(66-71) 127/71  SpO2:  [97 %-98 %] 98 %  Body mass index is 23 08 kg/m²  Input and Output Summary (last 24 hours):   No intake or output data in the 24 hours ending 22 0082    Physical Exam:   Physical Exam  Vitals and nursing note reviewed  Constitutional:       Appearance: Normal appearance  HENT:      Head: Normocephalic and atraumatic  Mouth/Throat:      Mouth: Mucous membranes are moist    Cardiovascular:      Rate and Rhythm: Normal rate and regular rhythm  Pulmonary:      Effort: Pulmonary effort is normal  No respiratory distress  Breath sounds: Normal breath sounds  No wheezing  Abdominal:      General: Abdomen is flat  There is no distension  Palpations: Abdomen is soft  Tenderness:  There is no abdominal tenderness  There is no guarding or rebound  Skin:     General: Skin is warm and dry  Neurological:      Mental Status: He is alert  Additional Data:     Labs:  Results from last 7 days   Lab Units 08/02/22  0452   WBC Thousand/uL 8 42   HEMOGLOBIN g/dL 14 2   HEMATOCRIT % 42 1   PLATELETS Thousands/uL 359   NEUTROS PCT % 67   LYMPHS PCT % 16   MONOS PCT % 13*   EOS PCT % 2     Results from last 7 days   Lab Units 08/02/22  0452 07/30/22  0500 07/29/22  1840   SODIUM mmol/L 137   < > 137   POTASSIUM mmol/L 3 6   < > 3 7   CHLORIDE mmol/L 111*   < > 111*   CO2 mmol/L 21   < > 16*   BUN mg/dL 14   < > 22   CREATININE mg/dL 1 05   < > 1 74*   ANION GAP mmol/L 5   < > 10   CALCIUM mg/dL 7 7*   < > 8 1*   ALBUMIN g/dL  --   --  3 8   TOTAL BILIRUBIN mg/dL  --   --  1 32*   ALK PHOS U/L  --   --  73   ALT U/L  --   --  36   AST U/L  --   --  32   GLUCOSE RANDOM mg/dL 117   < > 133    < > = values in this interval not displayed  Results from last 7 days   Lab Units 07/30/22  0500 07/29/22  1840   PROCALCITONIN ng/ml 0 30* 0 38*       Lines/Drains:  Invasive Devices  Report    Peripheral Intravenous Line  Duration           Peripheral IV 07/31/22 Left Antecubital 1 day                      Imaging: No pertinent imaging reviewed      Recent Cultures (last 7 days):   Results from last 7 days   Lab Units 07/29/22  1919   C DIFF TOXIN B BY PCR  Negative       Last 24 Hours Medication List:   Current Facility-Administered Medications   Medication Dose Route Frequency Provider Last Rate    acetaminophen  650 mg Oral Q6H PRN LISAS Conroy      aspirin  81 mg Oral Daily LISSA Conroy      atenolol  25 mg Oral Daily LISSA Conroy      calcium carbonate  1 tablet Oral BID With Meals LISSA Conroy      cholecalciferol  400 Units Oral Daily LISSA Conroy      heparin (porcine)  5,000 Units Subcutaneous [facility] LISSA Conroy      ondansetron  4 mg Intravenous Q6H PRN LISSA Funes      potassium chloride  40 mEq Oral BID [place] [facility], MD      pravastatin  10 mg Oral Daily LISSA Funes      psyllium  1 packet Oral Daily Jaxson Nelson DO          Today, Patient Was Seen By: Jaxson Nelson DO    **Please Note: This note may have been constructed using a voice recognition system  **

## 2022-08-02 NOTE — PLAN OF CARE
Problem: MOBILITY - ADULT  Goal: Maintain or return to baseline ADL function  Description: INTERVENTIONS:  -  Assess patient's ability to carry out ADLs; assess patient's baseline for ADL function and identify physical deficits which impact ability to perform ADLs (bathing, care of mouth/teeth, toileting, grooming, dressing, etc )  - Assess/evaluate cause of self-care deficits   - Assess range of motion  - Assess patient's mobility; develop plan if impaired  - Assess patient's need for assistive devices and provide as appropriate  - Encourage maximum independence but intervene and supervise when necessary  - Involve family in performance of ADLs  - Assess for home care needs following discharge   - Consider OT consult to assist with ADL evaluation and planning for discharge  - Provide patient education as appropriate  Outcome: Progressing  Goal: Maintains/Returns to pre admission functional level  Description: INTERVENTIONS:  - Perform BMAT or MOVE assessment daily    - Set and communicate daily mobility goal to care team and patient/family/caregiver  - Collaborate with rehabilitation services on mobility goals if consulted  - Perform Range of Motion  times a day  - Reposition patient every  hours    - Dangle patient  times a day  - Stand patient  times a day  - Ambulate patient  times a day  - Out of bed to chair  times a day   - Out of bed for meals  times a day  - Out of bed for toileting  - Record patient progress and toleration of activity level   Outcome: Progressing     Problem: PAIN - ADULT  Goal: Verbalizes/displays adequate comfort level or baseline comfort level  Description: Interventions:  - Encourage patient to monitor pain and request assistance  - Assess pain using appropriate pain scale  - Administer analgesics based on type and severity of pain and evaluate response  - Implement non-pharmacological measures as appropriate and evaluate response  - Consider cultural and social influences on pain and pain management  - Notify physician/advanced practitioner if interventions unsuccessful or patient reports new pain  Outcome: Progressing     Problem: INFECTION - ADULT  Goal: Absence or prevention of progression during hospitalization  Description: INTERVENTIONS:  - Assess and monitor for signs and symptoms of infection  - Monitor lab/diagnostic results  - Monitor all insertion sites, i e  indwelling lines, tubes, and drains  - Monitor endotracheal if appropriate and nasal secretions for changes in amount and color  - [place] appropriate cooling/warming therapies per order  - Administer medications as ordered  - Instruct and encourage patient and family to use good hand hygiene technique  - Identify and instruct in appropriate isolation precautions for identified infection/condition  Outcome: Progressing  Goal: Absence of fever/infection during neutropenic period  Description: INTERVENTIONS:  - Monitor WBC    Outcome: Progressing     Problem: SAFETY ADULT  Goal: Maintain or return to baseline ADL function  Description: INTERVENTIONS:  -  Assess patient's ability to carry out ADLs; assess patient's baseline for ADL function and identify physical deficits which impact ability to perform ADLs (bathing, care of mouth/teeth, toileting, grooming, dressing, etc )  - Assess/evaluate cause of self-care deficits   - Assess range of motion  - Assess patient's mobility; develop plan if impaired  - Assess patient's need for assistive devices and provide as appropriate  - Encourage maximum independence but intervene and supervise when necessary  - Involve family in performance of ADLs  - Assess for home care needs following discharge   - Consider OT consult to assist with ADL evaluation and planning for discharge  - Provide patient education as appropriate  Outcome: Progressing  Goal: Maintains/Returns to pre admission functional level  Description: INTERVENTIONS:  - Perform BMAT or MOVE assessment daily    - Set and communicate daily mobility goal to care team and patient/family/caregiver  - Collaborate with rehabilitation services on mobility goals if consulted  - Perform Range of Motion  times a day  - Reposition patient every  hours    - Dangle patient  times a day  - Stand patient  times a day  - Ambulate patient  times a day  - Out of bed to chair  times a day   - Out of bed for meals  times a day  - Out of bed for toileting  - Record patient progress and toleration of activity level   Outcome: Progressing  Goal: Patient will remain free of falls  Description: INTERVENTIONS:  - Educate patient/family on patient safety including physical limitations  - Instruct patient to call for assistance with activity   - Consult OT/PT to assist with strengthening/mobility   - Keep Call bell within reach  - Keep bed low and locked with side rails adjusted as appropriate  - Keep care items and personal belongings within reach  - Initiate and maintain comfort rounds  - Make Fall Risk Sign visible to staff  - Offer Toileting every  Hours, in advance of need  - Initiate/Maintain alarm  - Obtain necessary fall risk management equipment:  Apply yellow socks and bracelet for high fall risk patients  - Consider moving patient to room near nurses station  Outcome: Progressing     Problem: DISCHARGE PLANNING  Goal: Discharge to home or other facility with appropriate resources  Description: INTERVENTIONS:  - Identify barriers to discharge w/patient and caregiver  - Arrange for needed discharge resources and transportation as appropriate  - Identify discharge learning needs (meds, wound care, etc )  - Arrange for interpretive services to assist at discharge as needed  - Refer to Case Management Department for coordinating discharge planning if the patient needs post-hospital services based on physician/advanced practitioner order or complex needs related to functional status, cognitive ability, or social support system  Outcome: Progressing Problem: Knowledge Deficit  Goal: Patient/family/caregiver demonstrates understanding of disease process, treatment plan, medications, and discharge instructions  Description: Complete learning assessment and assess knowledge base    Interventions:  - Provide teaching at level of understanding  - Provide teaching via preferred learning methods  Outcome: Progressing     Problem: Potential for Falls  Goal: Patient will remain free of falls  Description: INTERVENTIONS:  - Educate patient/family on patient safety including physical limitations  - Instruct patient to call for assistance with activity   - Consult OT/PT to assist with strengthening/mobility   - Keep Call bell within reach  - Keep bed low and locked with side rails adjusted as appropriate  - Keep care items and personal belongings within reach  - Initiate and maintain comfort rounds  - Make Fall Risk Sign visible to staff  - Offer Toileting every  Hours, in advance of need  - Initiate/Maintain alarm  - Obtain necessary fall risk management equipment:   - Apply yellow socks and bracelet for high fall risk patients  - Consider moving patient to room near nurses station  Outcome: Progressing

## 2022-08-02 NOTE — ANESTHESIA POSTPROCEDURE EVALUATION
Post-Op Assessment Note    CV Status:  Stable  Pain Score: 0    Pain management: adequate     Mental Status:  Arousable and sleepy   Hydration Status:  Euvolemic and stable   PONV Controlled:  Controlled   Airway Patency:  Patent and adequate      Post Op Vitals Reviewed: Yes      Staff: CRNA         No complications documented      BP  127/59    Temp      Pulse 59   Resp 16   SpO2 100%

## 2022-08-02 NOTE — DISCHARGE INSTRUCTIONS
Esophagitis and Gastritis    RECOMMENDATION:  There is no recommended follow-up for this procedure  Follow-up biopsy results in 2 weeks  Recommend omeprazole 40 mg twice daily for the next 3 months followed by repeat EGD  Follow up cytology results  Avoid NSAIDs  Acute Diarrhea   WHAT YOU NEED TO KNOW:   Acute diarrhea starts quickly and lasts a short time, usually 1 to 3 days  It can last up to 2 weeks  You may not be able to control your diarrhea  Acute diarrhea usually stops on its own  DISCHARGE INSTRUCTIONS:   Return to the emergency department if:   You feel confused  Your heartbeat is faster than usual      Your eyes look deeply sunken, or you have no tears when you cry  You urinate less than usual, or your urine is dark yellow  You have blood or mucus in your bowel movements  You have severe abdominal pain  You are unable to drink any liquids  Contact your healthcare provider if:   Your symptoms do not get better with treatment  You have a fever higher than 101 3°F (38 5°C)  You have trouble eating and drinking because you are vomiting  Your diarrhea does not get better in 7 days  You have questions or concerns about your condition or care  Follow up with your healthcare provider as directed:  Write down your questions so you remember to ask them during your visits  Medicines:  Diarrhea medicine  is an over-the-counter medicine that helps slow or stop your diarrhea  Do not  take this medicine unless your healthcare provider says it is okay  Antibiotics  may be given to help treat an infection caused by bacteria  Antiparasitics  may be given to treat an infection caused by parasites  Take your medicine as directed  Contact your healthcare provider if you think your medicine is not helping or if you have side effects  Tell him of her if you are allergic to any medicine  Keep a list of the medicines, vitamins, and herbs you take   Include the amounts, and when and why you take them  Bring the list or the pill bottles to follow-up visits  Carry your medicine list with you in case of an emergency  Self-care:   Drink liquids as directed  Liquids will help prevent dehydration caused by diarrhea  Ask your healthcare provider how much liquid to drink each day and which liquids are best for you  You may need to drink an oral rehydration solution (ORS)  An ORS has the right amounts of water, salts, and sugar you need to replace body fluids  You can buy an ORS at most grocery stores and pharmacies  Eat foods that are easy to digest   Examples include rice, lentils, cereal, bananas, potatoes, and bread  It also includes some fruits (bananas, melon), well-cooked vegetables, and lean meats  Do not eat foods high in fiber, fat, and sugar  Do not drink alcohol until your diarrhea is gone  Prevent acute diarrhea:   Wash your hands often  Use soap and water  Wash your hands before you eat or prepare food  Also wash your hands after you use the bathroom  Use an alcohol-based hand gel when soap and water are not available  Keep bathroom surfaces clean  This helps prevent the spread of germs that cause acute diarrhea  Wash fruits and vegetables well before you eat them  This can help remove germs that cause diarrhea  If possible, remove the skin from fruits and vegetables, or cook them well before you eat them  Cook meat and poultry as directed  Meat includes beef and pork  Poultry includes chicken, turkey, and duck  Cook ground meat  to 160°F      [employer]OK, whole poultry, or cuts of poultry  to at least 165°F  Remove the poultry from heat  Let it stand for 3 minutes before you eat it  Cook whole cuts of meat other than poultry  to at least 145°F  Remove the meat from heat  Let it stand for 3 minutes before you eat it  Wash dishes that have touched raw meat or poultry with hot water and soap    This includes cutting boards, utensils, dishes, and serving containers  Place raw or cooked meat or poultry in the refrigerator as soon as possible  Bacteria can grow in meat or poultry that is left at room temperature too long  Do not eat raw or undercooked oysters, clams, or mussels  These foods may be contaminated and cause infection  Drink only filtered or treated water when you travel  Do not put ice in your drinks  Drink bottled water whenever possible  © Copyright [employer] 2022 Information is for End User's use only and may not be sold, redistributed or otherwise used for commercial purposes  All illustrations and images included in CareNotes® are the copyrighted property of A D A M , Inc  or [facility]   The above information is an  only  It is not intended as medical advice for individual conditions or treatments  Talk to your doctor, nurse or pharmacist before following any medical regimen to see if it is safe and effective for you

## 2022-08-02 NOTE — DISCHARGE INSTR - AVS FIRST PAGE
Dear Atiya Rojas,     It was our pleasure to care for you here at [facility]  It is our hope that we were always able to exceed the expected standards for your care during your stay  You were hospitalized due to diarrhea  You were cared for on the [facility] third floor by Anay Felix DO under the service of Mark Pacheco DO with the [facility] who covers for your primary care physician (PCP), No primary care provider on file  , while you were hospitalized  If you have any questions or concerns related to this hospitalization, you may contact us at [phone]  For follow up as well as any medication refills, we recommend that you follow up with your primary care physician  A registered nurse will reach out to you by phone within a few days after your discharge to answer any additional questions that you may have after going home  However, at this time we provide for you here, the most important instructions / recommendations at discharge:     Notable Medication Adjustments -   Please take Metamucil once daily at night to help form your stool, do not take within two hours of taking other medications by mouth  Please take Immodium as needed to help with symptoms of diarrhea  Continue taking Zofran ODT as needed for nausea  Please take omeprazole 40 mg twice a day for 3 months   Please take Questran twice a day with meals  If you become constipated please take it once a day  Testing Required after Discharge -   None  Important follow up information -   Please follow up with your Oncologist, Dr Reuben Viera, in one week  Please follow up with Primary Care, Dr Adolfo La, in one week  Other Instructions -   We hope you feel better soon  If your symptoms worsen, please call 911 immediately or go to the nearest Emergency Department    Please review this entire after visit summary as additional general instructions including medication list, appointments, activity, diet, any pertinent wound care, and other additional recommendations from your care team that may be provided for you        Sincerely,     Krystian Mora,

## 2022-08-02 NOTE — ASSESSMENT & PLAN NOTE
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 with multiple retroperitoneal, pelvic lymphadenopathy, bilateral adrenal gland involvement, bilateral lung involvement and left femur head involvement status post ORIF followed by radiation therapy he received 4 cycles of ipilimumab/nivolumab as of 11/01/2018 in [place] [address] at [facility]  · Most recent PET scan on 05/23/2022 displayed favorable response involving the lungs, retroperitoneal, bilateral adrenal glands and primary in the right kidney  · Current treatment plan of nivolumab 480 mg every month and Xgeva every 3 months  Plans: In-patient consult to Oncology  Follow-up with your oncologist Dr Doris Bliss as an outpatient after discharge

## 2022-08-02 NOTE — ASSESSMENT & PLAN NOTE
Lab Results   Component Value Date    K 3 6 08/02/2022    K 3 5 08/01/2022        Plan  Continue Potassium 40mEq BID for hypokalemia  Magnesium level 8/1/22 was 2 3  Potassium this AM has resolved, 3 6

## 2022-08-02 NOTE — ANESTHESIA PREPROCEDURE EVALUATION
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 [facility])      PULMONARY (within normal limits)      Digestive   (+) Nausea & vomiting      Musculoskeletal and Integument   (+) Bone metastases (HCC)      Other   (+) Diarrhea        Physical Exam    Airway    Mallampati score: III  TM Distance: >3 FB  Neck ROM: full     Dental   No notable dental hx     Cardiovascular  Rhythm: regular, Rate: normal,     Pulmonary  Breath sounds clear to auscultation,     Other Findings        Anesthesia Plan  ASA Score- 3     Anesthesia Type- IV sedation with anesthesia with ASA Monitors  Additional Monitors:   Airway Plan:           Plan Factors-Exercise tolerance (METS): >4 METS  Chart reviewed  EKG reviewed  Existing labs reviewed  Patient summary reviewed  Patient is not a current smoker  Patient not instructed to abstain from smoking on day of procedure  Patient did not smoke on day of surgery  Induction- intravenous  Postoperative Plan-     Informed Consent- Anesthetic plan and risks discussed with patient  I personally reviewed this patient with the CRNA  Discussed and agreed on the Anesthesia Plan with the CRNA  Valerie Lomas

## 2022-08-03 ENCOUNTER — TELEPHONE (OUTPATIENT)
Dept: HEMATOLOGY ONCOLOGY | Facility: CLINIC | Age: 77
End: 2022-08-03

## 2022-08-03 DIAGNOSIS — C64.1 PRIMARY MALIGNANT NEOPLASM OF RIGHT KIDNEY WITH METASTASIS FROM KIDNEY TO OTHER SITE (HCC): ICD-10-CM

## 2022-08-03 DIAGNOSIS — R19.7 DIARRHEA, UNSPECIFIED TYPE: Primary | ICD-10-CM

## 2022-08-03 DIAGNOSIS — C79.51 BONE METASTASES (HCC): ICD-10-CM

## 2022-08-03 RX ORDER — PREDNISONE 10 MG/1
TABLET ORAL
Qty: 60 TABLET | Refills: 0 | Status: SHIPPED | OUTPATIENT
Start: 2022-08-03

## 2022-08-03 RX ORDER — ONDANSETRON 4 MG/1
4 TABLET, ORALLY DISINTEGRATING ORAL EVERY 6 HOURS PRN
Qty: 30 TABLET | Refills: 1 | Status: SHIPPED | OUTPATIENT
Start: 2022-08-03

## 2022-08-03 NOTE — ASSESSMENT & PLAN NOTE
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 with multiple retroperitoneal, pelvic lymphadenopathy, bilateral adrenal gland involvement, bilateral lung involvement and left femur head involvement status post ORIF followed by radiation therapy he received 4 cycles of ipilimumab/nivolumab as of 11/01/2018 in [facility]  · Most recent PET scan on 05/23/2022 displayed favorable response involving the lungs, retroperitoneal, bilateral adrenal glands and primary in the right kidney  · Current treatment plan of nivolumab 480 mg every month and Xgeva every 3 months  Plans: Follow-up with your oncologist Dr Jenny Lee as an outpatient after discharge

## 2022-08-03 NOTE — ASSESSMENT & PLAN NOTE
Lab Results   Component Value Date    K 3 6 08/02/2022    K 3 5 08/01/2022        Plan  Continue Potassium 40mEq BID for hypokalemia  Magnesium level 8/1/22 was 2 3  Potassium this AM has resolved

## 2022-08-03 NOTE — ASSESSMENT & PLAN NOTE
· Presentation:  Patient presents with complaints of continued episodes of diarrhea, nausea and vomiting for the past 3 weeks  Of note, patient moved from [place] 3 weeks ago for continued renal cancer chemotherapy treatment with most recent treatment 2 months ago  He denies hematemesis, hematochezia, melena, fever, chills or abdominal pain  · CT A/P on 07/25/2022: No peripancreatic fluid collections seen  No biliary dilation seen  Fluid-filled bowel loops throughout the abdomen without bowel obstruction  · C diff negative, COVID negative, bacterial panel negative  · Frequency and consistency of the diarrhea improving    Plan:  Questran 4 g b i d per GI  Added Metamucil po  Patient advised to take Imodium as needed outpatient  Colonoscopy - follow up with results in two weeks

## 2022-08-03 NOTE — ASSESSMENT & PLAN NOTE
 Creatinine upon admission: 1 74  o Baseline: 0 9    Lab Results   Component Value Date    CREATININE 1 05 08/02/2022    CREATININE 1 05 08/01/2022    CREATININE 1 08 07/31/2022       Creatinine baseline of 0 9 - 1 0, at admission was found to be 1 74    Suspect prerenal secondary to N/V/D      Plan   Continue po hydration as tolerated   Avoid nephrotoxins and hypotension   Creatinine has returned to baseline, VIRGINIA resolved

## 2022-08-03 NOTE — TELEPHONE ENCOUNTER
Called wife to check on pt following discharge yesterday  Wife states he is still feeling poorly and multiple episodes of diarrhea this morning  Vomited x1 today after taking Omeprazole  Wife states she is going to give him Pepcid instead  He took one dose of Imodium  Advised he should take per package instructions

## 2022-08-03 NOTE — DISCHARGE SUMMARY
[facility]  Discharge- Omid Healy 1945, 76 y o  male MRN: [id number]  Unit/Bed#: S -01 Encounter: [id number]  Primary Care Provider: No primary care provider on file  Date and time admitted to hospital: 7/29/2022  6:04 PM    * Diarrhea  Assessment & Plan  · Presentation:  Patient presents with complaints of continued episodes of diarrhea, nausea and vomiting for the past 3 weeks  Of note, patient moved from [place] 3 weeks ago for continued renal cancer chemotherapy treatment with most recent treatment 2 months ago  He denies hematemesis, hematochezia, melena, fever, chills or abdominal pain  · CT A/P on 07/25/2022: No peripancreatic fluid collections seen  No biliary dilation seen  Fluid-filled bowel loops throughout the abdomen without bowel obstruction  · C diff negative, COVID negative, bacterial panel negative  · Frequency and consistency of the diarrhea improving    Plan:  Questran 4 g b i d per GI  Added Metamucil po  Patient advised to take Imodium as needed outpatient  Colonoscopy - follow up with results in two weeks  VIRGINIA (acute kidney injury) ([facility] [address] )  Assessment & Plan   Creatinine upon admission: 1 74  o Baseline: 0 9    Lab Results   Component Value Date    CREATININE 1 05 08/02/2022    CREATININE 1 05 08/01/2022    CREATININE 1 08 07/31/2022       Creatinine baseline of 0 9 - 1 0, at admission was found to be 1 74    Suspect prerenal secondary to N/V/D      Plan   Continue po hydration as tolerated   Avoid nephrotoxins and hypotension   Creatinine has returned to baseline, VIRGINIA resolved  Nausea & vomiting  Assessment & Plan  Plan  · Resolved, follow up with PCP and Oncology  Hypokalemia  Assessment & Plan  Lab Results   Component Value Date    K 3 6 08/02/2022    K 3 5 08/01/2022        Plan  Continue Potassium 40mEq BID for hypokalemia  Magnesium level 8/1/22 was 2 3  Potassium this AM has resolved      Hypertension  Assessment & Plan   Plan  o Continue Atenolol 25mg qd for HTN   o Hold if heart rate <53 bpm, or systolic <245 mmHg    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 [facility])  Assessment & Plan  · Patient has recently established care with Dr Erna Gonzalez of Hematology-Oncology  · 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 with multiple retroperitoneal, pelvic lymphadenopathy, bilateral adrenal gland involvement, bilateral lung involvement and left femur head involvement status post ORIF followed by radiation therapy he received 4 cycles of ipilimumab/nivolumab as of 11/01/2018 in [place] at [facility]  · Most recent PET scan on 05/23/2022 displayed favorable response involving the lungs, retroperitoneal, bilateral adrenal glands and primary in the right kidney  · Current treatment plan of nivolumab 480 mg every month and Xgeva every 3 months  Plans: Follow-up with oncologist, Dr Erna Gonzalez, as an outpatient within one week  Medical Problems             Resolved Problems  Date Reviewed: 8/1/2022   None               Discharging Resident: Lonnie Ho DO  Discharging Attending: Federica Moran DO  PCP: No primary care provider on file  Admission Date: 7/29/22  Admission Orders (From admission, onward)     Ordered        07/29/22 [address]  Once                      Discharge Date: 08/02/22    Consultations During Hospital Stay:  · Gastrology and Oncology  Procedures Performed:   · Colonoscopy    Significant Findings / Test Results:   EGD    Result Date: 8/2/2022  Impression: 1  LA grade D esophagitis  Biopsies obtained to assess for viral esophagitis and to rule out Candida esophagitis  2  Moderate gastritis with gastric erosions  3  Two diminutive linear clean based ulcers noted in the duodenal bulb and the 1st portion of the duodenum, likely NSAID induced  RECOMMENDATION: There is no recommended follow-up for this procedure  Follow-up biopsy results in 2 weeks  Recommend omeprazole 40 mg twice daily for the next 3 months followed by repeat EGD  Follow up cytology results  Avoid NSAIDs  Will proceed with colonoscopy  If biopsies/brushing confirm Candida, would recommend Diflucan Beryle Homme, MD     Colonoscopy    Result Date: 8/2/2022  · Impression: 1  Performed multiple forceps biopsies in the ascending colon and descending colon  Colonic mucosa appeared unremarkable, random biopsies obtained to assess for microscopic colitis  Terminal ileum was intubated and appeared normal within the distal 5 cm  Small, internal hemorrhoids Multiple mild diverticula in the descending colon and sigmoid colon RECOMMENDATION: No further screening colonoscopies necessary due to age (age = 77 or greater)  Follow-up biopsy results in 2 weeks  Recommend empirically starting on Questran 4 g b i d  pending the results of the biopsies  Beryle Homme, MD   ·     Incidental Findings:   · Acute Kidney injury secondary to volume status  Test Results Pending at Discharge (will require follow up): · Follow-up with biopsy results in 2 weeks with GI  Outpatient Tests Requested:  · None    Complications:  None    Reason for Admission: Diarrhea with associated nausea and vomiting    Hospital Course:   Marilee Mccarthy is a 76 y o  male patient who originally presented to the hospital on 7/29/2022 due to diarrhea, nausea, vomiting for the past 3 weeks  Of note, the patient originally presented to his [facility] ED on 07/25/2022 for similar complaints  He was unable to provide a stool sample at this time was discharged home to follow-up with outpatient  Upon readmission he was worked up for infectious causes, which were negative    GI and Oncology were consulted at which time GI recommended colonoscopy which he received while in the hospital   Oncology strongly suspects that his symptoms are related to his chemotherapy and suggest following up with Dr Leopoldo Prose outpatient  Please see above list of diagnoses and roelated plan for additional information  Condition at Discharge: fair    Discharge Day Visit / Exam:   Subjective: Today, the patient was seen at the bedside and is alert, oriented, calm, and pleasant in demeanor  The patient endorses following his prep instructions for colonoscopy and endorses some diarrhea  The patient was advised that this is expected with the bowel preparation  The patient has no new complaints at this time and states he is ready to be discharged to follow up outpatient once his colonoscopy has been completed  Of note, the patient has moist mucous membranes and does not appear overtly dry  Vitals: Blood Pressure: 127/67 (08/02/22 1613)  Pulse: 75 (08/02/22 1613)  Temperature: (!) 97 4 °F (36 3 °C) (08/02/22 1613)  Temp Source: Axillary (08/02/22 1613)  Respirations: 20 (08/02/22 1613)  Height: 5' 8" (172 7 cm) (07/29/22 2100)  Weight - Scale: 68 9 kg (151 lb 12 8 oz) (08/02/22 0449)  SpO2: 97 % (08/02/22 1613)  Exam:   Physical Exam  Vitals and nursing note reviewed  Constitutional:       General: He is not in acute distress  Appearance: Normal appearance  He is not toxic-appearing  HENT:      Head: Normocephalic and atraumatic  Mouth/Throat:      Mouth: Mucous membranes are moist    Cardiovascular:      Rate and Rhythm: Normal rate and regular rhythm  Pulses: Normal pulses  Heart sounds: Normal heart sounds  Pulmonary:      Effort: Pulmonary effort is normal  No respiratory distress  Breath sounds: Normal breath sounds  No wheezing or rales  Abdominal:      General: Abdomen is flat  There is no distension  Palpations: Abdomen is soft  Tenderness: There is no abdominal tenderness  There is no guarding or rebound  Skin:     General: Skin is warm and dry  Neurological:      Mental Status: He is alert and oriented to person, place, and time            Discussion with Family: Patient declined call to   Discharge instructions/Information to patient and family:   See after visit summary for information provided to patient and family  Provisions for Follow-Up Care:  See after visit summary for information related to follow-up care and any pertinent home health orders  Disposition:   Home    Planned Readmission: None    Discharge Medications:  See after visit summary for reconciled discharge medications provided to patient and/or family        **Please Note: This note may have been constructed using a voice recognition system**

## 2022-08-03 NOTE — TELEPHONE ENCOUNTER
Per Nael Quinn will order Prednisone 30mg daily  Will plan to call pt next week for update and may decrease to 20mg at that time if symptoms improved  Wife aware of plan  Wife also requesting zofran refill

## 2022-08-04 ENCOUNTER — TELEPHONE (OUTPATIENT)
Dept: HEMATOLOGY ONCOLOGY | Facility: CLINIC | Age: 77
End: 2022-08-04

## 2022-08-04 NOTE — TELEPHONE ENCOUNTER
Spoke with patient's wife about canceling Monday's treatment  She wants to know why it is being canceled

## 2022-08-04 NOTE — TELEPHONE ENCOUNTER
Please cancel 8/8 chemo  Pt will still need Xgeva injection on or around 8/8  Please reach out to wife to see if they want to keep 8/8 for Xgeva or reschedule to a different day   Thank you

## 2022-08-04 NOTE — TELEPHONE ENCOUNTER
Called wife and discussed again plan to hold treatment until he sees Dr Monica Thompson, as the treatment is likely causing his symptoms  They will keep the Xgeva scheduled for 8/8 at 8:30   Thank you

## 2022-08-05 ENCOUNTER — TELEPHONE (OUTPATIENT)
Dept: HEMATOLOGY ONCOLOGY | Facility: CLINIC | Age: 77
End: 2022-08-05

## 2022-08-05 NOTE — TELEPHONE ENCOUNTER
Left voicemail for pt letting him know Monday appointment for Gertrudis Osborne will be cancelled due to low calcium  Confirmed next appt is 8/15 with Dr Danish Salazar

## 2022-08-08 ENCOUNTER — TELEPHONE (OUTPATIENT)
Dept: HEMATOLOGY ONCOLOGY | Facility: CLINIC | Age: 77
End: 2022-08-08

## 2022-08-08 ENCOUNTER — HOSPITAL ENCOUNTER (OUTPATIENT)
Dept: INFUSION CENTER | Facility: CLINIC | Age: 77
End: 2022-08-08

## 2022-08-08 NOTE — TELEPHONE ENCOUNTER
Spoke with wife and pt  Pt states he's eating and diarrhea is much improved  Denies nausea  Does report some ankle edema  Per prior conversation with Ritika Lemme will decrease dose to 20mg daily  Pt will contact us if symptoms return

## 2022-08-10 ENCOUNTER — TELEPHONE (OUTPATIENT)
Dept: HEMATOLOGY ONCOLOGY | Facility: CLINIC | Age: 77
End: 2022-08-10

## 2022-08-11 ENCOUNTER — APPOINTMENT (EMERGENCY)
Dept: VASCULAR ULTRASOUND | Facility: HOSPITAL | Age: 77
End: 2022-08-11
Payer: MEDICARE

## 2022-08-11 ENCOUNTER — HOSPITAL ENCOUNTER (EMERGENCY)
Facility: HOSPITAL | Age: 77
Discharge: HOME | End: 2022-08-11
Attending: EMERGENCY MEDICINE
Payer: MEDICARE

## 2022-08-11 ENCOUNTER — TELEPHONE (OUTPATIENT)
Dept: HEMATOLOGY ONCOLOGY | Facility: CLINIC | Age: 77
End: 2022-08-11

## 2022-08-11 VITALS
TEMPERATURE: 98.6 F | DIASTOLIC BLOOD PRESSURE: 70 MMHG | RESPIRATION RATE: 17 BRPM | HEART RATE: 68 BPM | SYSTOLIC BLOOD PRESSURE: 141 MMHG | OXYGEN SATURATION: 98 %

## 2022-08-11 DIAGNOSIS — R26.2 AMBULATORY DYSFUNCTION: Primary | ICD-10-CM

## 2022-08-11 DIAGNOSIS — C79.51 BONE METASTASES (HCC): Primary | ICD-10-CM

## 2022-08-11 DIAGNOSIS — R60.0 BILATERAL LOWER EXTREMITY EDEMA: ICD-10-CM

## 2022-08-11 DIAGNOSIS — C64.1 PRIMARY MALIGNANT NEOPLASM OF RIGHT KIDNEY WITH METASTASIS FROM KIDNEY TO OTHER SITE (HCC): ICD-10-CM

## 2022-08-11 LAB
ALBUMIN SERPL BCP-MCNC: 3.1 G/DL (ref 3.5–5)
ALP SERPL-CCNC: 47 U/L (ref 34–104)
ALT SERPL W P-5'-P-CCNC: 30 U/L (ref 7–52)
ANION GAP SERPL CALCULATED.3IONS-SCNC: 4 MMOL/L (ref 4–13)
AST SERPL W P-5'-P-CCNC: 16 U/L (ref 13–39)
BASOPHILS # BLD AUTO: 0.02 THOUSANDS/ΜL (ref 0–0.1)
BASOPHILS NFR BLD AUTO: 0 % (ref 0–1)
BILIRUB SERPL-MCNC: 0.56 MG/DL (ref 0.2–1)
BUN SERPL-MCNC: 11 MG/DL (ref 5–25)
CALCIUM ALBUM COR SERPL-MCNC: 9.4 MG/DL (ref 8.3–10.1)
CALCIUM SERPL-MCNC: 8.7 MG/DL (ref 8.4–10.2)
CHLORIDE SERPL-SCNC: 106 MMOL/L (ref 96–108)
CO2 SERPL-SCNC: 30 MMOL/L (ref 21–32)
CREAT SERPL-MCNC: 0.93 MG/DL (ref 0.6–1.3)
EOSINOPHIL # BLD AUTO: 0.08 THOUSAND/ΜL (ref 0–0.61)
EOSINOPHIL NFR BLD AUTO: 1 % (ref 0–6)
ERYTHROCYTE [DISTWIDTH] IN BLOOD BY AUTOMATED COUNT: 14.6 % (ref 11.6–15.1)
GFR SERPL CREATININE-BSD FRML MDRD: 77 ML/MIN/1.73SQ M
GLUCOSE SERPL-MCNC: 101 MG/DL (ref 65–140)
HCT VFR BLD AUTO: 41.7 % (ref 36.5–49.3)
HGB BLD-MCNC: 13.9 G/DL (ref 12–17)
IMM GRANULOCYTES # BLD AUTO: 0.09 THOUSAND/UL (ref 0–0.2)
IMM GRANULOCYTES NFR BLD AUTO: 1 % (ref 0–2)
LYMPHOCYTES # BLD AUTO: 1.36 THOUSANDS/ΜL (ref 0.6–4.47)
LYMPHOCYTES NFR BLD AUTO: 15 % (ref 14–44)
MCH RBC QN AUTO: 32 PG (ref 26.8–34.3)
MCHC RBC AUTO-ENTMCNC: 33.3 G/DL (ref 31.4–37.4)
MCV RBC AUTO: 96 FL (ref 82–98)
MONOCYTES # BLD AUTO: 0.91 THOUSAND/ΜL (ref 0.17–1.22)
MONOCYTES NFR BLD AUTO: 10 % (ref 4–12)
NEUTROPHILS # BLD AUTO: 6.58 THOUSANDS/ΜL (ref 1.85–7.62)
NEUTS SEG NFR BLD AUTO: 73 % (ref 43–75)
NRBC BLD AUTO-RTO: 0 /100 WBCS
PLATELET # BLD AUTO: 250 THOUSANDS/UL (ref 149–390)
PMV BLD AUTO: 9 FL (ref 8.9–12.7)
POTASSIUM SERPL-SCNC: 3.5 MMOL/L (ref 3.5–5.3)
PROT SERPL-MCNC: 5.4 G/DL (ref 6.4–8.4)
RBC # BLD AUTO: 4.35 MILLION/UL (ref 3.88–5.62)
SODIUM SERPL-SCNC: 140 MMOL/L (ref 135–147)
WBC # BLD AUTO: 9.04 THOUSAND/UL (ref 4.31–10.16)

## 2022-08-11 PROCEDURE — 36415 COLL VENOUS BLD VENIPUNCTURE: CPT | Performed by: EMERGENCY MEDICINE

## 2022-08-11 PROCEDURE — 99285 EMERGENCY DEPT VISIT HI MDM: CPT

## 2022-08-11 PROCEDURE — 93970 EXTREMITY STUDY: CPT

## 2022-08-11 PROCEDURE — 85025 COMPLETE CBC W/AUTO DIFF WBC: CPT | Performed by: EMERGENCY MEDICINE

## 2022-08-11 PROCEDURE — 99284 EMERGENCY DEPT VISIT MOD MDM: CPT | Performed by: EMERGENCY MEDICINE

## 2022-08-11 PROCEDURE — 93970 EXTREMITY STUDY: CPT | Performed by: SURGERY

## 2022-08-11 PROCEDURE — 80053 COMPREHEN METABOLIC PANEL: CPT | Performed by: EMERGENCY MEDICINE

## 2022-08-11 NOTE — TELEPHONE ENCOUNTER
Received voicemail from pt's wife:  Krystal Goldsmith, this is Sherre Ear again  We were in into a problem during the night or it started a couple of days ago, but Edelmira Solano has not been able to walk very well  His leg has no strength to it and last night he had all he could do to get up and I more or less had to help him hold him up and it's still doing it this morning  Now I don't know whether that has anything to do with the Prednisone or  I don't know about the flygel, but could you please give me a call? I hate to keep bothering you, but this was the two of us  Almost ended up on the floor  Anyway, my number is [phone]  Thank you

## 2022-08-11 NOTE — DISCHARGE INSTRUCTIONS
Diagnosis; bilateral lower extremity edema-- ambulatory dysfunction which has resolved     - activity as tolerated     - please use walker to help get up and to assist in walking if needed- have by your side at all times     - would also recommend getting lower extremity compression stockings - starting with a low strength and wearing daily everyday- and taking off at night before bed- eventually you would want to work way up to wearing medium strength stockings daily -- over time     - please return to the er for  any  return of inability to walk or get out of bed- any new foot/leg weakness- an y inability to control your urine/stools/ any new problems with balance pr any new/ worsening/concerning symptoms to you

## 2022-08-14 NOTE — ED PROVIDER NOTES
History  Chief Complaint   Patient presents with    Weakness - Generalized     Pt states all of a sudden pt could not lift his legs up and c/o b/l edema in his feet     76 yr male with hx of met prostate ca-- no specific tx since 5/22 this am with gen weakness and had problems getting up from bed--  also with ble edema- no sob/palp/near/ syncope- normal amount of urination - no melena/brbpr- no ble weakness/loss of sensation - no b/b incont- no low back pain --       History provided by:  Patient and spouse   used: No        Prior to Admission Medications   Prescriptions Last Dose Informant Patient Reported? Taking?   aspirin 81 mg chewable tablet   Yes No   Sig: Chew 81 mg daily   atenolol (TENORMIN) 25 mg tablet   Yes No   Sig: Take 25 mg by mouth daily   calcium carbonate (OS-LITO) 600 MG tablet   Yes No   Sig: Take 600 mg by mouth 2 (two) times a day with meals   cholecalciferol (VITAMIN D3) 400 units tablet   Yes No   Sig: Take 400 Units by mouth daily   cholestyramine (QUESTRAN) 4 g packet   No No   Sig: Take 1 packet (4 g total) by mouth 2 (two) times a day with meals   denosumab (Xgeva) 120 mg/1 7 mL   Yes No   Sig: Inject 120 mg under the skin once   lisinopril (ZESTRIL) 20 mg tablet  Self Yes No   Sig: Take 20 mg by mouth daily   omeprazole (PriLOSEC) 40 MG capsule   No No   Sig: Take 1 capsule (40 mg total) by mouth 2 (two) times a day   ondansetron (Zofran ODT) 4 mg disintegrating tablet   No No   Sig: Take 1 tablet (4 mg total) by mouth every 6 (six) hours as needed for nausea or vomiting   pravastatin (PRAVACHOL) 10 mg tablet   Yes No   Sig: Take 10 mg by mouth daily   predniSONE 10 mg tablet   No No   Sig: Take 3 tablets (30 mg total) by mouth in the morning with food   psyllium (METAMUCIL) packet   No No   Sig: Take 1 packet by mouth daily Do not take within two hour window of taking medications        Facility-Administered Medications: None       Past Medical History:   Diagnosis Date    Hypertension     Kidney cancer, primary, 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 [facility])     MI (myocardial infarction) ([facility] [address] )        History reviewed  No pertinent surgical history  History reviewed  No pertinent family history  I have reviewed and agree with the history as documented  E-Cigarette/Vaping     E-Cigarette/Vaping Substances     Social History     Tobacco Use    Smoking status: Never Smoker    Smokeless tobacco: Never Used   Substance Use Topics    Alcohol use: Never    Drug use: Never       Review of Systems   Constitutional: Negative  HENT: Negative  Eyes: Negative  Respiratory: Negative  Cardiovascular: Positive for leg swelling  Negative for chest pain and palpitations  Gastrointestinal: Negative  Endocrine: Negative  Genitourinary: Negative  Musculoskeletal: Positive for gait problem  Negative for arthralgias, back pain, joint swelling, myalgias, neck pain and neck stiffness  Skin: Negative  Allergic/Immunologic: Negative  Neurological: Negative for dizziness, tremors, seizures, syncope, facial asymmetry, speech difficulty, weakness, light-headedness, numbness and headaches  Hematological: Negative  Psychiatric/Behavioral: Negative  Physical Exam  Physical Exam  Vitals and nursing note reviewed  Constitutional:       General: He is not in acute distress  Appearance: Normal appearance  He is not ill-appearing, toxic-appearing or diaphoretic  Comments: avss- pulse ox 95 % on ra- interpretation is normal- no intervention    HENT:      Head: Normocephalic and atraumatic  Nose: Nose normal       Mouth/Throat:      Mouth: Mucous membranes are moist    Eyes:      General: No scleral icterus  Right eye: No discharge  Left eye: No discharge  Extraocular Movements: Extraocular movements intact  Conjunctiva/sclera: Conjunctivae normal       Pupils: Pupils are equal, round, and reactive to light  Comments: Mm pink   Neck:      Vascular: No carotid bruit  Comments: No pmt c/t/l/s spine   Cardiovascular:      Rate and Rhythm: Normal rate and regular rhythm  Pulses: Normal pulses  Heart sounds: Normal heart sounds  No murmur heard  No friction rub  No gallop  Pulmonary:      Effort: Pulmonary effort is normal  No respiratory distress  Breath sounds: Normal breath sounds  No stridor  No wheezing, rhonchi or rales  Chest:      Chest wall: No tenderness  Abdominal:      General: Bowel sounds are normal  There is no distension  Palpations: Abdomen is soft  There is no mass  Tenderness: There is no abdominal tenderness  There is no right CVA tenderness, left CVA tenderness, guarding or rebound  Hernia: No hernia is present  Comments: Soft nt/nd- no hsm-  No cva tenderness- no ascites- no peritoneal signs- no pulsatile abd angelia/bruit/ tenderness   Musculoskeletal:         General: No swelling, tenderness, deformity or signs of injury  Normal range of motion  Cervical back: Normal range of motion and neck supple  No rigidity or tenderness  Right lower leg: Edema present  Left lower leg: Edema present  Comments: 1 plus ble pretibial edema- lle greater than rle-- nt- no erythema- equal bilateral radial/dp pulses   Lymphadenopathy:      Cervical: No cervical adenopathy  Skin:     General: Skin is warm  Capillary Refill: Capillary refill takes less than 2 seconds  Coloration: Skin is not jaundiced or pale  Findings: No bruising, erythema or lesion  Neurological:      General: No focal deficit present  Mental Status: He is alert and oriented to person, place, and time  Mental status is at baseline  Cranial Nerves: No cranial nerve deficit  Sensory: No sensory deficit  Motor: No weakness        Coordination: Coordination normal       Gait: Gait normal       Comments: Normal non focal neuro exam    Psychiatric: Mood and Affect: Mood normal          Behavior: Behavior normal          Thought Content:  Thought content normal          Judgment: Judgment normal          Vital Signs  ED Triage Vitals [08/11/22 1136]   Temperature Pulse Respirations Blood Pressure SpO2   98 6 °F (37 °C) 75 16 124/69 95 %      Temp Source Heart Rate Source Patient Position - Orthostatic VS BP Location FiO2 (%)   Oral Monitor Sitting Left arm --      Pain Score       --           Vitals:    08/11/22 1136 08/11/22 1245 08/11/22 1345   BP: 124/69 141/70 141/70   Pulse: 75 64 68   Patient Position - Orthostatic VS: Sitting           Visual Acuity  Visual Acuity    Flowsheet Row Most Recent Value   L Pupil Size (mm) 3   R Pupil Size (mm) 3          ED Medications  Medications - No data to display    Diagnostic Studies  Results Reviewed     Procedure Component Value Units Date/Time    Comprehensive metabolic panel [[id number]]  (Abnormal) Collected: 08/11/22 1244    Lab Status: Final result Specimen: Blood from Arm, Left Updated: 08/11/22 1312     Sodium 140 mmol/L      Potassium 3 5 mmol/L      Chloride 106 mmol/L      CO2 30 mmol/L      ANION GAP 4 mmol/L      BUN 11 mg/dL      Creatinine 0 93 mg/dL      Glucose 101 mg/dL      Calcium 8 7 mg/dL      Corrected Calcium 9 4 mg/dL      AST 16 U/L      ALT 30 U/L      Alkaline Phosphatase 47 U/L      Total Protein 5 4 g/dL      Albumin 3 1 g/dL      Total Bilirubin 0 56 mg/dL      eGFR 77 ml/min/1 73sq m     Narrative:      Manjinder guidelines for Chronic Kidney Disease (CKD):     Stage 1 with normal or high GFR (GFR > 90 mL/min/1 73 square meters)    Stage 2 Mild CKD (GFR = 60-89 mL/min/1 73 square meters)    Stage 3A Moderate CKD (GFR = 45-59 mL/min/1 73 square meters)    Stage 3B Moderate CKD (GFR = 30-44 mL/min/1 73 square meters)    Stage 4 Severe CKD (GFR = 15-29 mL/min/1 73 square meters)    Stage 5 End Stage CKD (GFR <15 mL/min/1 73 square meters)  Note: GFR calculation is accurate only with a steady state creatinine    CBC and differential [[id number]] Collected: 08/11/22 1244    Lab Status: Final result Specimen: Blood from Arm, Left Updated: 08/11/22 1304     WBC 9 04 Thousand/uL      RBC 4 35 Million/uL      Hemoglobin 13 9 g/dL      Hematocrit 41 7 %      MCV 96 fL      MCH 32 0 pg      MCHC 33 3 g/dL      RDW 14 6 %      MPV 9 0 fL      Platelets 563 Thousands/uL      nRBC 0 /100 WBCs      Neutrophils Relative 73 %      Immat GRANS % 1 %      Lymphocytes Relative 15 %      Monocytes Relative 10 %      Eosinophils Relative 1 %      Basophils Relative 0 %      Neutrophils Absolute 6 58 Thousands/µL      Immature Grans Absolute 0 09 Thousand/uL      Lymphocytes Absolute 1 36 Thousands/µL      Monocytes Absolute 0 91 Thousand/µL      Eosinophils Absolute 0 08 Thousand/µL      Basophils Absolute 0 02 Thousands/µL                  VAS lower limb venous duplex study, complete bilateral   Final Result by Tye Cid MD (08/11 1439)                 Procedures  Procedures         ED Course  ED Course as of 08/14/22 1605   Thu Aug 11, 2022   1236 -- er md note- recent d/c notes- procedures labs/ past  hem/onc note reviewed   1352 Ble doppler u/s-  neg for prox/calf dvt thru tech    1414 Er md note- pt re-evaluated-  again able to get up in bed- unassisted and ambulate in er- which he could not do at home-- non focal neuro exam -- will d/c-  with walker rx/ rec for ble compression stockings-- and reasons when to return - pt and wife verbalize understanding    1432 Er md note - message left on tiger text to pt hemo/onc nurse  lis fernandez about er visit and workup - dr Maria Antonia Gusman is away thsi week    12 Er md medical decision making note--  with normal neuro exam -- no low back pain no red flags   er md clinical suspicion of any central or spinal cords mets are low ---  will not purse any  advanced imaging at this time MDM    Disposition  Final diagnoses:   Ambulatory dysfunction   Bilateral lower extremity edema     Time reflects when diagnosis was documented in both MDM as applicable and the Disposition within this note     Time User Action Codes Description Comment    8/11/2022  2:16 PM Zofia Bentont Add [R26 2] Ambulatory dysfunction     8/11/2022  2:16 PM Zofia José Miguel Add [R60 0] Bilateral lower extremity edema       ED Disposition     ED Disposition   Discharge    Condition   Stable    Date/Time   Thu Aug 11, 2022  2:16 PM    Comment   Katerine Andrea discharge to home/self care                 Follow-up Information    None         Discharge Medication List as of 8/11/2022  2:21 PM      CONTINUE these medications which have NOT CHANGED    Details   aspirin 81 mg chewable tablet Chew 81 mg daily, Historical Med      atenolol (TENORMIN) 25 mg tablet Take 25 mg by mouth daily, Historical Med      calcium carbonate (OS-LITO) 600 MG tablet Take 600 mg by mouth 2 (two) times a day with meals, Historical Med      cholecalciferol (VITAMIN D3) 400 units tablet Take 400 Units by mouth daily, Historical Med      cholestyramine (QUESTRAN) 4 g packet Take 1 packet (4 g total) by mouth 2 (two) times a day with meals, Starting Tue 8/2/2022, Until Thu 9/1/2022, Normal      denosumab (Xgeva) 120 mg/1 7 mL Inject 120 mg under the skin once, Historical Med      lisinopril (ZESTRIL) 20 mg tablet Take 20 mg by mouth daily, Historical Med      omeprazole (PriLOSEC) 40 MG capsule Take 1 capsule (40 mg total) by mouth 2 (two) times a day, Starting Tue 8/2/2022, Until Mon 10/31/2022, Normal      ondansetron (Zofran ODT) 4 mg disintegrating tablet Take 1 tablet (4 mg total) by mouth every 6 (six) hours as needed for nausea or vomiting, Starting Wed 8/3/2022, Normal      pravastatin (PRAVACHOL) 10 mg tablet Take 10 mg by mouth daily, Historical Med      predniSONE 10 mg tablet Take 3 tablets (30 mg total) by mouth in the morning with food, Normal      psyllium (METAMUCIL) packet Take 1 packet by mouth daily Do not take within two hour window of taking medications  , Starting Tue 8/2/2022, No Print             No discharge procedures on file      PDMP Review     None          ED Provider  Electronically Signed by           Nellie Calderon MD  08/14/22 6293

## 2022-08-15 ENCOUNTER — OFFICE VISIT (OUTPATIENT)
Dept: HEMATOLOGY ONCOLOGY | Facility: CLINIC | Age: 77
End: 2022-08-15
Payer: MEDICARE

## 2022-08-15 VITALS
DIASTOLIC BLOOD PRESSURE: 80 MMHG | HEIGHT: 68 IN | OXYGEN SATURATION: 93 % | RESPIRATION RATE: 16 BRPM | BODY MASS INDEX: 24.1 KG/M2 | HEART RATE: 77 BPM | SYSTOLIC BLOOD PRESSURE: 140 MMHG | WEIGHT: 159 LBS | TEMPERATURE: 97.6 F

## 2022-08-15 DIAGNOSIS — C79.51 BONE METASTASES (HCC): ICD-10-CM

## 2022-08-15 DIAGNOSIS — C64.1 PRIMARY MALIGNANT NEOPLASM OF RIGHT KIDNEY WITH METASTASIS FROM KIDNEY TO OTHER SITE (HCC): Primary | ICD-10-CM

## 2022-08-15 PROCEDURE — 99214 OFFICE O/P EST MOD 30 MIN: CPT | Performed by: INTERNAL MEDICINE

## 2022-08-15 NOTE — PROGRESS NOTES
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 with multiple retroperitoneal, pelvic lymphadenopathy, bilateral adrenal gland involvement, bilateral lung involvement and left femur head involvement status post ORIF followed by radiation therapy he received 4 cycles of ipilimumab/nivolumab as of 11/01/2018 at [address] in [facility]     He had good response with subsequent continuation of nivolumab 480 mg every month     The last PET scan on 05/23/2022 showed favorable response involving the lungs, retroperitoneal, bilateral adrenal glands primary in the right kidney    Immunotherapy induced colitis with subsequent diarrhea, dehydration requiring admission to the hospital, currently on prednisone 20 mg p o  daily, reduce to 10 mg p o  daily in 1 week and then stay on prednisone 10 mg p o  daily for 2 weeks and then discontinue     Discontinue nivolumab    CT scan of the abdomen and pelvis in 3 months with CBC, CMP     Discontinue Xgeva        Labs and imaging studies are reviewed by ordering provider once results are available  If there are findings that need immediate attention, you will be contacted when results available  Discussing results and the implication on your healthcare is best discussed in person at your follow-up visit         HPI:    The patient is 59-year-old  male with history of multiple skin cancers, history of melanoma when he was golfing and he had pain in the left thigh area, with subsequent limbing     Bone scan on 06/2018 showed abnormal increased uptake within the left femoral neck, inter trochanteric area and proximal day of feces, MRI showed abnormal marrow signal within the left femoral neck, neck, intertrochanteric region with extension to the proximal diaphoresis with mild bone expansion and endosteal scalloping, enlarged prostate measuring 4 8 x 3 6 cm fullness of the right kidney, right kidney biopsy showed high-grade renal cell carcinoma, clear cell subtype with possibility of sarcomatoid variant, positive for vimentin, CD10, comp 5 2, negative for PAX8, melan a, S100 and TTF1     Status post embolization to the tumor of the left hip in August 2018, and left total hip arthroplasty with pathological fracture, metastatic high-grade and sarcomatoid carcinoma consistent with grade 4 renal cell carcinoma, workup showed invading of the right adrenal gland, lung, bone involvement avid on PET scan     Status post 4 cycles of ipilimumab/nivolumab as of 11/01/2018 followed by maintenance nivolumab 480 mg every 4 weeks status post 15 fraction of radiation therapy to the left area     Regarding history of melanoma on his face requiring sentinel lymph node biopsy, history of 2 areas of melanoma in the back and the leg     He had been receiving Xgeva as of 11/07/2019 on monthly basis in [place] under Dr Reema Ramirez     PET scan on 05/23/2022 showed necrotic left upper lobe lung mass measuring 2 5 x 2 4 cm with significant decrease from the initial PET scan with mild uptake nodule in the right lower lobe of the lung measuring 1 1 x 0 7 cm unchanged, nodule within the superior segment of the right lower lobe of the lung measuring 0 9 x 0 7 cm, mild uptake in the inferior pole of the right kidney measuring 1 7 x 1 6 cm, mild mesenteric, retroperitoneal, iliac, inguinal lymph node right adrenal nodule measuring 1 0 x 0 7 cm, left adrenal nodule measuring 2 2 x 1 6 cm and stable uptake in the left femoral head into the neck      He does not smoke or drink     Interval History:     immunotherapy induced diarrhea and colitis in July 2022 requiring discontinuation of nivolumab, CT scan however showed decrease in adrenal metastases    On prednisone 20 mg p o  daily    Previous Treatment:         Test Results:    Imaging: EGD    Result Date: 8/2/2022  Narrative: [facility] [address] [facility], [address] [phone] DATE OF SERVICE: 8/02/22 PHYSICIAN(S): Attending: Kailash Boston MD Fellow: No Staff Documented INDICATION: Nausea and vomiting, unspecified vomiting type POST-OP DIAGNOSIS: See the impression below  PREPROCEDURE: Informed consent was obtained for the procedure, including sedation  Risks of perforation, hemorrhage, adverse drug reaction and aspiration were discussed  The patient was placed in the left lateral decubitus position  Patient was explained about the risks and benefits of the procedure  Risks including but not limited to bleeding, infection, and perforation were explained in detail  Also explained about less than 100% sensitivity with the exam and other alternatives  DETAILS OF PROCEDURE: Patient was taken to the procedure room where a time out was performed to confirm correct patient and correct procedure  The patient underwent monitored anesthesia care, which was administered by an anesthesia professional  The patient's blood pressure, heart rate, level of consciousness, respirations and oxygen were monitored throughout the procedure  The scope was advanced to the second part of the duodenum  Retroflexion was performed in the fundus  The patient experienced no blood loss  The procedure was not difficult  The patient tolerated the procedure well  There were no apparent complications  ANESTHESIA INFORMATION: ASA: III Anesthesia Type: IV Sedation with Anesthesia MEDICATIONS: No administrations occurring from 1434 to 1447 on 08/02/22 FINDINGS: Moderate, generalized erythematous mucosa with erosion in the middle third of the esophagus, lower third of the esophagus and GE junction; performed cold forceps biopsy  La grade D esophagitis starting from 30 to 40 cm  Biopsies obtained to assess for CMV/HSV and Candida  Brushings also obtained   Moderate, patchy edematous and erythematous mucosa with erosion in the fundus of the stomach; performed cold forceps biopsy to rule out H  pylori  Retroflexed view was unremarkable  Pylorus was patent  2 small, superficial, linear ulcers in the duodenal bulb and 1st part of the duodenum with clean base (José Manuel III) SPECIMENS: * No specimens in log *     Impression: 1  LA grade D esophagitis  Biopsies obtained to assess for viral esophagitis and to rule out Candida esophagitis  2  Moderate gastritis with gastric erosions  3  Two diminutive linear clean based ulcers noted in the duodenal bulb and the 1st portion of the duodenum, likely NSAID induced  RECOMMENDATION: There is no recommended follow-up for this procedure  Follow-up biopsy results in 2 weeks  Recommend omeprazole 40 mg twice daily for the next 3 months followed by repeat EGD  Follow up cytology results  Avoid NSAIDs  Will proceed with colonoscopy  If biopsies/brushing confirm Candida, would recommend Derrick Galaviz MD     Colonoscopy    Result Date: 8/2/2022  Narrative: [id number] [address] [place] [address] [phone] DATE OF SERVICE: 8/02/22 PHYSICIAN(S): Attending: Jenifer Galaviz MD Fellow: No Staff Documented INDICATION: Diarrhea, unspecified type POST-OP DIAGNOSIS: See the impression below  HISTORY: Prior colonoscopy: No prior colonoscopy  BOWEL PREPARATION: Golytely/Colyte/Trilyte PREPROCEDURE: Informed consent was obtained for the procedure, including sedation  Risks including but not limited to bleeding, infection, perforation, adverse drug reaction and aspiration were explained in detail  Also explained about less than 100% sensitivity with the exam and other alternatives  The patient was placed in the left lateral decubitus position  DETAILS OF PROCEDURE: Patient was taken to the procedure room where a time out was performed to confirm correct patient and correct procedure   The patient underwent monitored anesthesia care, which was administered by an anesthesia professional  The patient's blood pressure, heart rate, level of consciousness, oxygen and respirations were monitored throughout the procedure  A digital rectal exam was performed  The scope was introduced through the anus and advanced to the terminal ileum  Retroflexion was performed in the rectum  The quality of bowel preparation was evaluated using the [facility] Bowel Preparation Scale with scores of: right colon = 2, transverse colon = 2, left colon = 2  The total BBPS score was 6  Bowel prep was adequate  The patient experienced no blood loss  The procedure was not difficult  The patient tolerated the procedure well  There were no apparent complications  ANESTHESIA INFORMATION: ASA: III Anesthesia Type: IV Sedation with Anesthesia MEDICATIONS: heparin (porcine) subcutaneous injection 5,000 Units 0 Units simethicone (MYLICON) oral suspension 40 mg (Totals for administrations occurring from 1434 to 1508 on 08/02/22) FINDINGS: Performed multiple forceps biopsies in the ascending colon and descending colon  Colonic mucosa appeared unremarkable, random biopsies obtained to assess for microscopic colitis  Terminal ileum was intubated and appeared normal within the distal 5 cm   Small, internal hemorrhoids Multiple mild diverticula in the descending colon and sigmoid colon EVENTS: Procedure Events Event Event Time ENDO CECUM REACHED 8/2/2022  2:56 PM ENDO SCOPE OUT TIME 8/2/2022  3:07 PM SPECIMENS: ID Type Source Tests Collected by Time Destination 1 : gastric bx r/o h, pylori Tissue Stomach TISSUE EXAM Miko Stevenson MD 8/2/2022  2:50 PM  2 : mid esophageal bx r/o candida/CMV/HSV Tissue Esophagus TISSUE EXAM Miko Stevenson MD 8/2/2022  2:51 PM  3 : ESOPHAGEAL BRUSHING R/O CANDIDA Brushing Esophagus NON-GYNECOLOGIC CYTOLOGY Miko Stevenson MD 8/2/2022  2:51 PM  4 : random colon bx's r/o microscopic colitis Tissue Colon TISSUE EXAM Catie Queen MD 8/2/2022  3:03 PM  EQUIPMENT: Colonoscope -[id number]     Impression: 1  Performed multiple forceps biopsies in the ascending colon and descending colon  Colonic mucosa appeared unremarkable, random biopsies obtained to assess for microscopic colitis  Terminal ileum was intubated and appeared normal within the distal 5 cm  Small, internal hemorrhoids Multiple mild diverticula in the descending colon and sigmoid colon RECOMMENDATION: No further screening colonoscopies necessary due to age (age = 77 or greater)  Follow-up biopsy results in 2 weeks  Recommend empirically starting on Questran 4 g b i d  pending the results of the biopsies  Catie Queen MD     CT abdomen pelvis wo contrast    Result Date: 7/25/2022  Narrative: CT ABDOMEN AND PELVIS WITHOUT IV CONTRAST INDICATION:   Pancreatitis suspected r/o pancreatitis  Renal cancer COMPARISON:  Previous PET/CT from May 23, 2022 TECHNIQUE:  CT examination of the abdomen and pelvis was performed without intravenous contrast  Axial, sagittal, and coronal 2D reformatted images were created from the source data and submitted for interpretation  Radiation dose length product (DLP) for this visit:  335 mGy-cm   This examination, like all CT scans performed in the [facility], was performed utilizing techniques to minimize radiation dose exposure, including the use of iterative reconstruction and automated exposure control  Enteric contrast was administered  FINDINGS: ABDOMEN LOWER CHEST:  No clinically significant abnormality identified in the visualized lower chest  LIVER/BILIARY TREE:  Mild hepatic steatosis seen GALLBLADDER:  No calcified gallstones  No pericholecystic inflammatory change  Layering density within the gallbladder due to sludge SPLEEN:  Unremarkable  PANCREAS:  No pancreatic ductal dilation  No pancreatic atrophy ADRENAL GLANDS:  Left adrenal nodule seen which demonstrates attenuation of 59 Hounsfield unit  This is smaller since the previous study of  2018 this measures 1 4 cm, previously measuring 2 5 cm stable since previous PET scan from May 23, 2022 Right adrenal nodule remains unchanged KIDNEYS/URETERS:  No hydronephrosis seen The right perinephric region lesion seen on the previous study of 2018, smaller and visible as residual thickening of the left posterior pararenal fascia and left david of diaphragm, image 28 series 2 STOMACH AND BOWEL:  Diverticulosis seen Fluid-filled small bowel loops seen without evidence of bowel obstruction APPENDIX:  No findings to suggest appendicitis  ABDOMINOPELVIC CAVITY:  No ascites  No pneumoperitoneum  No lymphadenopathy  VESSELS:  No abdominal aortic aneurysm No retroperitoneal PELVIS REPRODUCTIVE ORGANS:  Prostate calcification seen URINARY BLADDER:  Unremarkable  ABDOMINAL WALL/INGUINAL REGIONS:  Unremarkable  OSSEOUS STRUCTURES:  No acute compression collapse vertebra Degenerative changes seen within the lumbar spine with spondylotic changes at L5-S1 Left hip arthroplasty seen Heterotopic ossification seen around the left hip joint A sclerotic lesion seen within the left inferior pubic ramus, stable from previous PET scan of May 23, 2022    Impression: No peripancreatic fluid collection seen No biliary dilation seen Fluid-filled bowel loops throughout the abdomen without bowel obstruction Workstation performed: [id number]     VAS lower limb venous duplex study, complete bilateral    Result Date: 8/11/2022  Narrative:  [facility] REPORT CLINICAL: Indications: Patient presents with bilateral lower extremity edema, left > right x several weeks  Patient denies pain in lower extremities  Operative History: No cardiovascular surgeries noted  Risk Factors The patient has history of HTN and CKD  CONCLUSION:  Impression:  RIGHT LOWER LIMB: No evidence of acute or chronic deep vein thrombosis  No evidence of superficial thrombophlebitis noted   Doppler evaluation shows a normal response to augmentation maneuvers  Popliteal, posterior tibial and anterior tibial arterial Doppler waveforms are triphasic/biphasic  LEFT LOWER LIMB: No evidence of acute or chronic deep vein thrombosis  No evidence of superficial thrombophlebitis noted  Doppler evaluation shows a normal response to augmentation maneuvers  Popliteal and posterior tibiall arterial Doppler waveforms are triphasic/biphasic  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  Technical findings were given to MD Gold Ariza Ion: Electronically Signed by: Tacho Hdez on 2022-08-11 02:39:28 PM      Labs:   Lab Results   Component Value Date    WBC 9 04 08/11/2022    HGB 13 9 08/11/2022    HCT 41 7 08/11/2022    MCV 96 08/11/2022     08/11/2022     Lab Results   Component Value Date    K 3 5 08/11/2022     08/11/2022    CO2 30 08/11/2022    BUN 11 08/11/2022    CREATININE 0 93 08/11/2022    CALCIUM 8 7 08/11/2022    CORRECTEDCA 9 4 08/11/2022    AST 16 08/11/2022    ALT 30 08/11/2022    ALKPHOS 47 08/11/2022    EGFR 77 08/11/2022       No results found for: IRON, TIBC, FERRITIN    No results found for: [id number]      ROS: Review of Systems   Constitutional: Positive for fatigue  Negative for appetite change, chills, diaphoresis, fever and unexpected weight change  HENT:   Negative for hearing loss, lump/mass, mouth sores, nosebleeds, sore throat, trouble swallowing and voice change  Eyes: Negative  Negative for eye problems and icterus  Respiratory: Negative  Negative for chest tightness, cough, hemoptysis and shortness of breath  Cardiovascular: Negative for chest pain and leg swelling  Gastrointestinal: Negative for abdominal distention, abdominal pain, blood in stool, constipation, diarrhea and nausea  Endocrine: Negative  Genitourinary: Negative for dysuria, frequency, hematuria and pelvic pain  Musculoskeletal: Negative    Negative for arthralgias, back pain, flank pain, gait problem, myalgias and neck stiffness  Skin: Negative for itching and rash  Neurological: Negative for dizziness, gait problem, headaches, light-headedness, numbness and speech difficulty  Hematological: Negative for adenopathy  Does not bruise/bleed easily  Psychiatric/Behavioral: Negative for confusion, decreased concentration, depression and sleep disturbance  The patient is not nervous/anxious  Current Medications: Reviewed  Allergies: Reviewed  PMH/FH/SH:  Reviewed      Physical Exam:    Body surface area is 1 85 meters squared  Wt Readings from Last 3 Encounters:   08/15/22 72 1 kg (159 lb)   08/02/22 68 9 kg (151 lb 12 8 oz)   07/08/22 77 6 kg (171 lb)        Temp Readings from Last 3 Encounters:   08/15/22 97 6 °F (36 4 °C) (Tympanic)   08/11/22 98 6 °F (37 °C) (Oral)   08/02/22 (!) 97 4 °F (36 3 °C) (Axillary)        BP Readings from Last 3 Encounters:   08/15/22 140/80   08/11/22 141/70   08/02/22 127/67         Pulse Readings from Last 3 Encounters:   08/15/22 77   08/11/22 68   08/02/22 75        Physical Exam  Vitals reviewed  Constitutional:       General: He is not in acute distress  Appearance: He is well-developed  He is not diaphoretic  Comments: Using cane for ambulation   HENT:      Head: Normocephalic and atraumatic  Eyes:      Conjunctiva/sclera: Conjunctivae normal    Neck:      Trachea: No tracheal deviation  Cardiovascular:      Rate and Rhythm: Normal rate and regular rhythm  Heart sounds: No murmur heard  No friction rub  No gallop  Pulmonary:      Effort: Pulmonary effort is normal  No respiratory distress  Breath sounds: Normal breath sounds  No wheezing or rales  Chest:      Chest wall: No tenderness  Abdominal:      General: There is no distension  Palpations: Abdomen is soft  Tenderness: There is no abdominal tenderness  Musculoskeletal:      Cervical back: Normal range of motion and neck supple        Right lower leg: No edema  Left lower leg: No edema  Lymphadenopathy:      Cervical: No cervical adenopathy  Skin:     General: Skin is warm and dry  Coloration: Skin is not pale  Findings: No erythema  Neurological:      Mental Status: He is alert and oriented to person, place, and time  Psychiatric:         Behavior: Behavior normal          Thought Content: Thought content normal          Judgment: Judgment normal          ECO    Goals and Barriers:  Current Goal: Minimize effects of disease  Barriers: None  Patient's Capacity to Self Care:  Patient is able to self care      Code Status: @[facility]@

## 2022-08-22 ENCOUNTER — TELEPHONE (OUTPATIENT)
Dept: GASTROENTEROLOGY | Facility: CLINIC | Age: 77
End: 2022-08-22

## 2022-08-22 NOTE — TELEPHONE ENCOUNTER
Spoke with patient regarding setting up 3 month repeat EGD  Patient states that he is not interested in booking at this time  He would like to speak to his regular doctor first and will call us when he is ready

## 2022-11-03 ENCOUNTER — TELEPHONE (OUTPATIENT)
Dept: HEMATOLOGY ONCOLOGY | Facility: CLINIC | Age: 77
End: 2022-11-03

## 2022-11-07 ENCOUNTER — HOSPITAL ENCOUNTER (OUTPATIENT)
Dept: CT IMAGING | Facility: HOSPITAL | Age: 77
Discharge: HOME | End: 2022-11-07
Attending: INTERNAL MEDICINE

## 2022-11-07 DIAGNOSIS — C79.51 BONE METASTASES (HCC): ICD-10-CM

## 2022-11-07 DIAGNOSIS — C64.1 PRIMARY MALIGNANT NEOPLASM OF RIGHT KIDNEY WITH METASTASIS FROM KIDNEY TO OTHER SITE (HCC): ICD-10-CM

## 2022-11-10 ENCOUNTER — TELEPHONE (OUTPATIENT)
Dept: HEMATOLOGY ONCOLOGY | Facility: CLINIC | Age: 77
End: 2022-11-10

## 2022-11-10 NOTE — TELEPHONE ENCOUNTER
Received call from radiology reporting significant findings on CT: IMPRESSION:     Mild enlargement of the right middle lobe pulmonary nodule now measuring 6 x 4 mm increasing in size since July 25, 2022 when it measured 5 x 3 mm  Given the interval increase in size, this is concerning for a metastasis, but is too small to biopsy  Consider additional 6 month follow-up      Stable left adrenal nodule      Right perinephric scarring unchanged from the prior study      Diverticulosis        Pt has follow up 11/21

## 2022-11-14 ENCOUNTER — TELEPHONE (OUTPATIENT)
Dept: HEMATOLOGY ONCOLOGY | Facility: CLINIC | Age: 77
End: 2022-11-14

## 2022-11-14 NOTE — TELEPHONE ENCOUNTER
Received call from wife asking for clarification on message below  Advised CBC and CMP requested  She verbalized understanding

## 2022-11-17 ENCOUNTER — APPOINTMENT (OUTPATIENT)
Dept: LAB | Age: 77
End: 2022-11-17

## 2022-11-17 DIAGNOSIS — C64.1 PRIMARY MALIGNANT NEOPLASM OF RIGHT KIDNEY WITH METASTASIS FROM KIDNEY TO OTHER SITE (HCC): ICD-10-CM

## 2022-11-17 DIAGNOSIS — C79.51 BONE METASTASES (HCC): ICD-10-CM

## 2022-11-17 LAB
ALBUMIN SERPL BCP-MCNC: 3.5 G/DL (ref 3.5–5)
ALP SERPL-CCNC: 68 U/L (ref 46–116)
ALT SERPL W P-5'-P-CCNC: 29 U/L (ref 12–78)
ANION GAP SERPL CALCULATED.3IONS-SCNC: 2 MMOL/L (ref 4–13)
AST SERPL W P-5'-P-CCNC: 16 U/L (ref 5–45)
BASOPHILS # BLD AUTO: 0.05 THOUSANDS/ÂΜL (ref 0–0.1)
BASOPHILS NFR BLD AUTO: 1 % (ref 0–1)
BILIRUB SERPL-MCNC: 0.41 MG/DL (ref 0.2–1)
BUN SERPL-MCNC: 15 MG/DL (ref 5–25)
CALCIUM SERPL-MCNC: 9.3 MG/DL (ref 8.3–10.1)
CHLORIDE SERPL-SCNC: 106 MMOL/L (ref 96–108)
CO2 SERPL-SCNC: 29 MMOL/L (ref 21–32)
CREAT SERPL-MCNC: 1.08 MG/DL (ref 0.6–1.3)
EOSINOPHIL # BLD AUTO: 0.13 THOUSAND/ÂΜL (ref 0–0.61)
EOSINOPHIL NFR BLD AUTO: 2 % (ref 0–6)
ERYTHROCYTE [DISTWIDTH] IN BLOOD BY AUTOMATED COUNT: 12.5 % (ref 11.6–15.1)
GFR SERPL CREATININE-BSD FRML MDRD: 64 ML/MIN/1.73SQ M
GLUCOSE SERPL-MCNC: 196 MG/DL (ref 65–140)
HCT VFR BLD AUTO: 41.8 % (ref 36.5–49.3)
HGB BLD-MCNC: 13.2 G/DL (ref 12–17)
IMM GRANULOCYTES # BLD AUTO: 0.01 THOUSAND/UL (ref 0–0.2)
IMM GRANULOCYTES NFR BLD AUTO: 0 % (ref 0–2)
LYMPHOCYTES # BLD AUTO: 1.58 THOUSANDS/ÂΜL (ref 0.6–4.47)
LYMPHOCYTES NFR BLD AUTO: 29 % (ref 14–44)
MCH RBC QN AUTO: 32.1 PG (ref 26.8–34.3)
MCHC RBC AUTO-ENTMCNC: 31.6 G/DL (ref 31.4–37.4)
MCV RBC AUTO: 102 FL (ref 82–98)
MONOCYTES # BLD AUTO: 0.48 THOUSAND/ÂΜL (ref 0.17–1.22)
MONOCYTES NFR BLD AUTO: 9 % (ref 4–12)
NEUTROPHILS # BLD AUTO: 3.28 THOUSANDS/ÂΜL (ref 1.85–7.62)
NEUTS SEG NFR BLD AUTO: 59 % (ref 43–75)
NRBC BLD AUTO-RTO: 0 /100 WBCS
PLATELET # BLD AUTO: 260 THOUSANDS/UL (ref 149–390)
PMV BLD AUTO: 9.2 FL (ref 8.9–12.7)
POTASSIUM SERPL-SCNC: 4.1 MMOL/L (ref 3.5–5.3)
PROT SERPL-MCNC: 6.5 G/DL (ref 6.4–8.4)
RBC # BLD AUTO: 4.11 MILLION/UL (ref 3.88–5.62)
SODIUM SERPL-SCNC: 137 MMOL/L (ref 135–147)
WBC # BLD AUTO: 5.53 THOUSAND/UL (ref 4.31–10.16)

## 2022-11-21 ENCOUNTER — OFFICE VISIT (OUTPATIENT)
Dept: HEMATOLOGY ONCOLOGY | Facility: CLINIC | Age: 77
End: 2022-11-21

## 2022-11-21 VITALS
WEIGHT: 157 LBS | DIASTOLIC BLOOD PRESSURE: 70 MMHG | HEART RATE: 65 BPM | HEIGHT: 68 IN | BODY MASS INDEX: 23.79 KG/M2 | OXYGEN SATURATION: 98 % | RESPIRATION RATE: 17 BRPM | TEMPERATURE: 97 F | SYSTOLIC BLOOD PRESSURE: 130 MMHG

## 2022-11-21 DIAGNOSIS — C79.51 BONE METASTASES (HCC): ICD-10-CM

## 2022-11-21 DIAGNOSIS — L57.0 KERATOSIS, ACTINIC: ICD-10-CM

## 2022-11-21 DIAGNOSIS — C64.1 PRIMARY MALIGNANT NEOPLASM OF RIGHT KIDNEY WITH METASTASIS FROM KIDNEY TO OTHER SITE (HCC): Primary | ICD-10-CM

## 2022-11-21 PROBLEM — R11.2 NAUSEA & VOMITING: Status: RESOLVED | Noted: 2022-08-01 | Resolved: 2022-11-21

## 2022-11-21 PROBLEM — E87.6 HYPOKALEMIA: Status: RESOLVED | Noted: 2022-07-30 | Resolved: 2022-11-21

## 2022-11-21 RX ORDER — FLUOROURACIL 50 MG/G
CREAM TOPICAL 2 TIMES DAILY
Qty: 40 G | Refills: 0 | Status: SHIPPED | OUTPATIENT
Start: 2022-11-21

## 2022-11-21 NOTE — PROGRESS NOTES
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 with multiple retroperitoneal, pelvic lymphadenopathy, bilateral adrenal gland involvement, bilateral lung involvement and left femur head involvement status post ORIF followed by radiation therapy he received 4 cycles of ipilimumab/nivolumab as of 11/01/2018 at [address] in [facility]     He had good response with subsequent continuation of nivolumab 480 mg every month     The last PET scan on 05/23/2022 showed favorable response involving the lungs, retroperitoneal, bilateral adrenal glands primary in the right kidney    CT scan on 11/2022 showed right lower lobe lung nodule growing by 1 mm to 6 mm, otherwise stable disease no new lesions    Will continue watchful observation and repeat CT scan in 3 month    Immunotherapy induced colitis with diarrhea, dehydration requiring admission to the hospital he was treated with prednisone currently this is improving he is not on prednisone anymore    Actinic keratosis, 5-FU ointment apply twice a day        Labs and imaging studies are reviewed by ordering provider once results are available  If there are findings that need immediate attention, you will be contacted when results available  Discussing results and the implication on your healthcare is best discussed in person at your follow-up visit         HPI:    The patient is 64-year-old  male with history of multiple skin cancers, history of melanoma when he was golfing and he had pain in the left thigh area, with subsequent limbing     Bone scan on 06/2018 showed abnormal increased uptake within the left femoral neck, inter trochanteric area and proximal day of feces, MRI showed abnormal marrow signal within the left femoral neck, neck, intertrochanteric region with extension to the proximal diaphoresis with mild bone expansion and endosteal scalloping, enlarged prostate measuring 4 8 x 3 6 cm fullness of the right kidney, right kidney biopsy showed high-grade renal cell carcinoma, clear cell subtype with possibility of sarcomatoid variant, positive for vimentin, CD10, comp 5 2, negative for PAX8, melan a, S100 and TTF1     Status post embolization to the tumor of the left hip in August 2018, and left total hip arthroplasty with pathological fracture, metastatic high-grade and sarcomatoid carcinoma consistent with grade 4 renal cell carcinoma, workup showed invading of the right adrenal gland, lung, bone involvement avid on PET scan     Status post 4 cycles of ipilimumab/nivolumab as of 11/01/2018 followed by maintenance nivolumab 480 mg every 4 weeks status post 15 fraction of radiation therapy to the left area     Regarding history of melanoma on his face requiring sentinel lymph node biopsy, history of 2 areas of melanoma in the back and the leg     He had been receiving Xgeva as of 11/07/2019 on monthly basis in [place] under Dr Stella Zavala     PET scan on 05/23/2022 showed necrotic left upper lobe lung mass measuring 2 5 x 2 4 cm with significant decrease from the initial PET scan with mild uptake nodule in the right lower lobe of the lung measuring 1 1 x 0 7 cm unchanged, nodule within the superior segment of the right lower lobe of the lung measuring 0 9 x 0 7 cm, mild uptake in the inferior pole of the right kidney measuring 1 7 x 1 6 cm, mild mesenteric, retroperitoneal, iliac, inguinal lymph node right adrenal nodule measuring 1 0 x 0 7 cm, left adrenal nodule measuring 2 2 x 1 6 cm and stable uptake in the left femoral head into the neck      He does not smoke or drink    immunotherapy induced diarrhea and colitis in July 2022 requiring discontinuation of nivolumab, CT scan however showed decrease in adrenal metastases  Interval History:    1-3 bowel movements a day, his appetite is great, he has off prednisone    Previous Treatment:         Test Results:    Imaging: CT abdomen pelvis wo contrast    Result Date: 11/10/2022  Narrative: CT ABDOMEN AND PELVIS WITHOUT IV CONTRAST INDICATION:   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 C64 1: Malignant neoplasm of right kidney, except renal pelvis  COMPARISON:  July 25, 2022 TECHNIQUE:  CT examination of the abdomen and pelvis was performed without intravenous contrast  Axial, sagittal, and coronal 2D reformatted images were created from the source data and submitted for interpretation  Radiation dose length product (DLP) for this visit:  324 mGy-cm   This examination, like all CT scans performed in the [facility], was performed utilizing techniques to minimize radiation dose exposure, including the use of iterative reconstruction and automated exposure control  Enteric contrast was administered  FINDINGS: ABDOMEN LOWER CHEST:  Stable 5 x 3 mm left lower lobe pulmonary nodule (series 2 image 2)  6 x 4 mm right middle lobe pulmonary nodule (2/5) which was present in retrospect on the prior study measuring 5 x 3 mm  LIVER/BILIARY TREE:  Unremarkable  GALLBLADDER:  No calcified gallstones  No pericholecystic inflammatory change  SPLEEN:  Unremarkable  PANCREAS:  Unremarkable  ADRENAL GLANDS:  Stable hyperdense, partially calcified 1 7 x 1 4 cm left adrenal nodule  KIDNEYS/URETERS:  Unremarkable  No hydronephrosis  Stable scarring medial right perinephric soft tissues extending superiorly towards the diaphragmatic david  STOMACH AND BOWEL:  There is colonic diverticulosis without evidence of acute diverticulitis  APPENDIX:  A normal appendix was visualized  ABDOMINOPELVIC CAVITY:  No ascites  No pneumoperitoneum  No lymphadenopathy  VESSELS:  Atherosclerotic changes are present  No evidence of aneurysm  PELVIS REPRODUCTIVE ORGANS:  Unremarkable for patient's age  URINARY BLADDER:  Unremarkable   ABDOMINAL WALL/INGUINAL REGIONS:  Unremarkable  OSSEOUS STRUCTURES:  No acute fracture or destructive osseous lesion  Spinal degenerative changes are noted  Impression: Mild enlargement of the right middle lobe pulmonary nodule now measuring 6 x 4 mm increasing in size since July 25, 2022 when it measured 5 x 3 mm  Given the interval increase in size, this is concerning for a metastasis, but is too small to biopsy  Consider additional 6 month follow-up  Stable left adrenal nodule  Right perinephric scarring unchanged from the prior study  Diverticulosis  The study was marked in EPIC for significant notification  Workstation performed: [id number]       Labs:   Lab Results   Component Value Date    WBC 5 53 11/17/2022    HGB 13 2 11/17/2022    HCT 41 8 11/17/2022     (H) 11/17/2022     11/17/2022     Lab Results   Component Value Date    K 4 1 11/17/2022     11/17/2022    CO2 29 11/17/2022    BUN 15 11/17/2022    CREATININE 1 08 11/17/2022    CALCIUM 9 3 11/17/2022    CORRECTEDCA 9 4 08/11/2022    AST 16 11/17/2022    ALT 29 11/17/2022    ALKPHOS 68 11/17/2022    EGFR 64 11/17/2022       No results found for: IRON, TIBC, FERRITIN    No results found for: [id number]      ROS: Review of Systems   Constitutional: Negative  Negative for appetite change, chills, diaphoresis, fatigue, fever and unexpected weight change  HENT:   Negative for hearing loss, lump/mass, mouth sores, nosebleeds, sore throat, trouble swallowing and voice change  Eyes: Negative  Negative for eye problems and icterus  Respiratory: Negative  Negative for chest tightness, cough, hemoptysis and shortness of breath  Cardiovascular: Negative for chest pain and leg swelling  Gastrointestinal: Negative for abdominal distention, abdominal pain, blood in stool, constipation, diarrhea and nausea  Endocrine: Negative  Genitourinary: Negative for dysuria, frequency, hematuria and pelvic pain  Musculoskeletal: Negative  Negative for arthralgias, back pain, flank pain, gait problem, myalgias and neck stiffness  Skin: Positive for rash  Negative for itching  Neurological: Negative for dizziness, gait problem, headaches, light-headedness, numbness and speech difficulty  Hematological: Negative for adenopathy  Does not bruise/bleed easily  Psychiatric/Behavioral: Negative for confusion, decreased concentration, depression and sleep disturbance  The patient is not nervous/anxious  Current Medications: Reviewed  Allergies: Reviewed  PMH/FH/SH:  Reviewed      Physical Exam:    Body surface area is 1 84 meters squared  Wt Readings from Last 3 Encounters:   11/21/22 71 2 kg (157 lb)   08/15/22 72 1 kg (159 lb)   08/02/22 68 9 kg (151 lb 12 8 oz)        Temp Readings from Last 3 Encounters:   11/21/22 (!) 97 °F (36 1 °C)   08/15/22 97 6 °F (36 4 °C) (Tympanic)   08/11/22 98 6 °F (37 °C) (Oral)        BP Readings from Last 3 Encounters:   11/21/22 130/70   08/15/22 140/80   08/11/22 141/70         Pulse Readings from Last 3 Encounters:   11/21/22 65   08/15/22 77   08/11/22 68        Physical Exam  Vitals reviewed  Constitutional:       General: He is not in acute distress  Appearance: He is well-developed and well-nourished  He is not diaphoretic  HENT:      Head: Normocephalic and atraumatic  Eyes:      Conjunctiva/sclera: Conjunctivae normal    Neck:      Trachea: No tracheal deviation  Cardiovascular:      Rate and Rhythm: Normal rate and regular rhythm  Heart sounds: No murmur heard  No friction rub  No gallop  Pulmonary:      Effort: Pulmonary effort is normal  No respiratory distress  Breath sounds: Normal breath sounds  No wheezing or rales  Chest:      Chest wall: No tenderness  Abdominal:      General: There is no distension  Palpations: Abdomen is soft  Tenderness: There is no abdominal tenderness     Musculoskeletal:      Cervical back: Normal range of motion and neck supple  Lymphadenopathy:      Cervical: No cervical adenopathy  Skin:     General: Skin is warm and dry  Coloration: Skin is not pale  Findings: Lesion (Few skin lesions over the scalp and the back of the head) present  No erythema  Neurological:      Mental Status: He is alert and oriented to person, place, and time  Psychiatric:         Mood and Affect: Mood and affect normal          Behavior: Behavior normal          Thought Content: Thought content normal          Judgment: Judgment normal          ECO    Goals and Barriers:  Current Goal: Minimize effects of disease  Barriers: None  Patient's Capacity to Self Care:  Patient is able to self care      Code Status: @[facility]@

## 2023-02-17 ENCOUNTER — HOSPITAL ENCOUNTER (OUTPATIENT)
Dept: CT IMAGING | Facility: HOSPITAL | Age: 78
Discharge: HOME | End: 2023-02-17
Attending: INTERNAL MEDICINE

## 2023-02-17 DIAGNOSIS — C64.1 PRIMARY MALIGNANT NEOPLASM OF RIGHT KIDNEY WITH METASTASIS FROM KIDNEY TO OTHER SITE (HCC): ICD-10-CM

## 2023-02-21 ENCOUNTER — APPOINTMENT (OUTPATIENT)
Dept: LAB | Age: 78
End: 2023-02-21

## 2023-02-21 DIAGNOSIS — C64.1 PRIMARY MALIGNANT NEOPLASM OF RIGHT KIDNEY WITH METASTASIS FROM KIDNEY TO OTHER SITE (HCC): ICD-10-CM

## 2023-02-21 DIAGNOSIS — L57.0 KERATOSIS, ACTINIC: ICD-10-CM

## 2023-02-21 DIAGNOSIS — C79.51 BONE METASTASES (HCC): ICD-10-CM

## 2023-02-21 LAB
ALBUMIN SERPL BCP-MCNC: 3.5 G/DL (ref 3.5–5)
ALP SERPL-CCNC: 81 U/L (ref 46–116)
ALT SERPL W P-5'-P-CCNC: 39 U/L (ref 12–78)
ANION GAP SERPL CALCULATED.3IONS-SCNC: 2 MMOL/L (ref 4–13)
AST SERPL W P-5'-P-CCNC: 21 U/L (ref 5–45)
BASOPHILS # BLD AUTO: 0.03 THOUSANDS/ÂΜL (ref 0–0.1)
BASOPHILS NFR BLD AUTO: 1 % (ref 0–1)
BILIRUB SERPL-MCNC: 0.35 MG/DL (ref 0.2–1)
BUN SERPL-MCNC: 15 MG/DL (ref 5–25)
CALCIUM SERPL-MCNC: 9.1 MG/DL (ref 8.3–10.1)
CHLORIDE SERPL-SCNC: 107 MMOL/L (ref 96–108)
CO2 SERPL-SCNC: 28 MMOL/L (ref 21–32)
CREAT SERPL-MCNC: 0.97 MG/DL (ref 0.6–1.3)
EOSINOPHIL # BLD AUTO: 0.11 THOUSAND/ÂΜL (ref 0–0.61)
EOSINOPHIL NFR BLD AUTO: 2 % (ref 0–6)
ERYTHROCYTE [DISTWIDTH] IN BLOOD BY AUTOMATED COUNT: 13.1 % (ref 11.6–15.1)
GFR SERPL CREATININE-BSD FRML MDRD: 73 ML/MIN/1.73SQ M
GLUCOSE SERPL-MCNC: 97 MG/DL (ref 65–140)
HCT VFR BLD AUTO: 40.9 % (ref 36.5–49.3)
HGB BLD-MCNC: 13.4 G/DL (ref 12–17)
IMM GRANULOCYTES # BLD AUTO: 0.04 THOUSAND/UL (ref 0–0.2)
IMM GRANULOCYTES NFR BLD AUTO: 1 % (ref 0–2)
LYMPHOCYTES # BLD AUTO: 1.46 THOUSANDS/ÂΜL (ref 0.6–4.47)
LYMPHOCYTES NFR BLD AUTO: 24 % (ref 14–44)
MCH RBC QN AUTO: 32 PG (ref 26.8–34.3)
MCHC RBC AUTO-ENTMCNC: 32.8 G/DL (ref 31.4–37.4)
MCV RBC AUTO: 98 FL (ref 82–98)
MONOCYTES # BLD AUTO: 0.54 THOUSAND/ÂΜL (ref 0.17–1.22)
MONOCYTES NFR BLD AUTO: 9 % (ref 4–12)
NEUTROPHILS # BLD AUTO: 4.03 THOUSANDS/ÂΜL (ref 1.85–7.62)
NEUTS SEG NFR BLD AUTO: 63 % (ref 43–75)
NRBC BLD AUTO-RTO: 0 /100 WBCS
PLATELET # BLD AUTO: 279 THOUSANDS/UL (ref 149–390)
PMV BLD AUTO: 8.8 FL (ref 8.9–12.7)
POTASSIUM SERPL-SCNC: 4.4 MMOL/L (ref 3.5–5.3)
PROT SERPL-MCNC: 6.6 G/DL (ref 6.4–8.4)
RBC # BLD AUTO: 4.19 MILLION/UL (ref 3.88–5.62)
SODIUM SERPL-SCNC: 137 MMOL/L (ref 135–147)
WBC # BLD AUTO: 6.21 THOUSAND/UL (ref 4.31–10.16)

## 2023-02-24 ENCOUNTER — OFFICE VISIT (OUTPATIENT)
Dept: HEMATOLOGY ONCOLOGY | Facility: CLINIC | Age: 78
End: 2023-02-24

## 2023-02-24 VITALS
DIASTOLIC BLOOD PRESSURE: 90 MMHG | HEART RATE: 69 BPM | RESPIRATION RATE: 16 BRPM | BODY MASS INDEX: 24.55 KG/M2 | WEIGHT: 162 LBS | HEIGHT: 68 IN | SYSTOLIC BLOOD PRESSURE: 160 MMHG | OXYGEN SATURATION: 98 % | TEMPERATURE: 97.2 F

## 2023-02-24 DIAGNOSIS — C79.51 BONE METASTASES (HCC): ICD-10-CM

## 2023-02-24 DIAGNOSIS — E23.1 DRUG-INDUCED HYPOPITUITARISM (HCC): ICD-10-CM

## 2023-02-24 DIAGNOSIS — I21.9 MYOCARDIAL INFARCTION, UNSPECIFIED MI TYPE, UNSPECIFIED ARTERY (HCC): Primary | ICD-10-CM

## 2023-02-24 DIAGNOSIS — C64.1 PRIMARY MALIGNANT NEOPLASM OF RIGHT KIDNEY WITH METASTASIS FROM KIDNEY TO OTHER SITE (HCC): ICD-10-CM

## 2023-02-24 NOTE — LETTER
February 24, 2023     Shira Mcclellan, [address] [place] [id number]    Patient: Denver Bonilla   YOB: 1945   Date of Visit: 2/24/2023       Dear Dr Elizabeth Silver:    Thank you for referring Denver Bonilla to me for evaluation  Below are my notes for this consultation  If you have questions, please do not hesitate to call me  I look forward to following your patient along with you  Sincerely,        Shana Lewis MD        CC: No Recipients  Shana Lewis MD  2/24/2023  9:00 AM  Sign when Signing Visit  Hematology Outpatient Follow - Up Note  Denver Bonilla 76 y o  male MRN: @ Encounter: [id number]        Date:  2/24/2023        Assessment/ Plan:    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 with multiple retroperitoneal, pelvic lymphadenopathy, bilateral adrenal gland involvement, bilateral lung involvement and left femur head involvement status post ORIF followed by radiation therapy he received 4 cycles [facility]     He had good response with subsequent continuation of nivolumab 480 mg every month     The last PET scan on 05/23/2022 showed favorable response involving the lungs, retroperitoneal, bilateral adrenal glands primary in the right kidney    CAT scan in February 2023 showed stable left upper lobe lung nodule, bilateral small stable lung nodules await for official reading    We will refill the patient to radiation oncology to determine SBRT    Otherwise he will stay off of immunotherapy    We will follow-up in 3 months with CAT scan of the chest abdomen and pelvis, CBC, CMP, TSH    He has previous heart attack we will refer patient to cardiology        Labs and imaging studies are reviewed by ordering provider once results are available  If there are findings that need immediate attention, you will be contacted when results available     Discussing results and the implication on your healthcare is best discussed in person at your follow-up visit         HPI:    The patient is 64-year-old  male with history of multiple skin cancers, history of melanoma when he was golfing and he had pain in the left thigh area, with subsequent limbing     Bone scan on 06/2018 showed abnormal increased uptake within the left femoral neck, inter trochanteric area and proximal day of feces, MRI showed abnormal marrow signal within the left femoral neck, neck, intertrochanteric region with extension to the proximal diaphoresis with mild bone expansion and endosteal scalloping, enlarged prostate measuring 4 8 x 3 6 cm fullness of the right kidney, right kidney biopsy showed high-grade renal cell carcinoma, clear cell subtype with possibility of sarcomatoid variant, positive for vimentin, CD10, comp 5 2, negative for PAX8, melan a, S100 and TTF1     Status post embolization to the tumor of the left hip in August 2018, and left total hip arthroplasty with pathological fracture, metastatic high-grade and sarcomatoid carcinoma consistent with grade 4 renal cell carcinoma, workup showed invading of the right adrenal gland, lung, bone involvement avid on PET scan     Status post 4 cycles of ipilimumab/nivolumab as of 11/01/2018 followed by maintenance nivolumab 480 mg every 4 weeks status post 15 fraction of radiation therapy to the left area     Regarding history of melanoma on his face requiring sentinel lymph node biopsy, history of 2 areas of melanoma in the back and the leg     He had been receiving Xgeva as of 11/07/2019 on monthly basis in [place] under Dr Mariposa Magallanes     PET scan on 05/23/2022 showed necrotic left upper lobe lung mass measuring 2 5 x 2 4 cm with significant decrease from the initial PET scan with mild uptake nodule in the right lower lobe of the lung measuring 1 1 x 0 7 cm unchanged, nodule within the superior segment of the right lower lobe of the lung measuring 0 9 x 0 7 cm, mild uptake in the inferior pole of the right kidney measuring 1 7 x 1 6 cm, mild mesenteric, retroperitoneal, iliac, inguinal lymph node right adrenal nodule measuring 1 0 x 0 7 cm, left adrenal nodule measuring 2 2 x 1 6 cm and stable uptake in the left femoral head into the neck      He does not smoke or drink     immunotherapy induced diarrhea and colitis in July 2022 requiring discontinuation of nivolumab, CT scan however showed decrease in adrenal metastases  Interval History:        Previous Treatment:         Test Results:    Imaging: No results found  Labs:   Lab Results   Component Value Date    WBC 6 21 02/21/2023    HGB 13 4 02/21/2023    HCT 40 9 02/21/2023    MCV 98 02/21/2023     02/21/2023     Lab Results   Component Value Date    K 4 4 02/21/2023     02/21/2023    CO2 28 02/21/2023    BUN 15 02/21/2023    CREATININE 0 97 02/21/2023    CALCIUM 9 1 02/21/2023    CORRECTEDCA 9 4 08/11/2022    AST 21 02/21/2023    ALT 39 02/21/2023    ALKPHOS 81 02/21/2023    EGFR 73 02/21/2023       No results found for: IRON, TIBC, FERRITIN    No results found for: [id number]      ROS: Review of Systems   Constitutional: Negative  Negative for appetite change, chills, diaphoresis, fatigue, fever and unexpected weight change  HENT:   Negative for hearing loss, lump/mass, mouth sores, nosebleeds, sore throat, trouble swallowing and voice change  Eyes: Negative  Negative for eye problems and icterus  Respiratory: Negative  Negative for chest tightness, cough, hemoptysis and shortness of breath  Cardiovascular: Negative for chest pain and leg swelling  Gastrointestinal: Negative for abdominal distention, abdominal pain, blood in stool, constipation, diarrhea and nausea  Endocrine: Negative  Genitourinary: Negative for dysuria, frequency, hematuria and pelvic pain  Musculoskeletal: Positive for gait problem  Negative for arthralgias, back pain, flank pain, myalgias and neck stiffness  Skin: Negative for itching and rash  Neurological: Positive for gait problem  Negative for dizziness, headaches, light-headedness, numbness and speech difficulty  Hematological: Negative for adenopathy  Does not bruise/bleed easily  Psychiatric/Behavioral: Negative for confusion, decreased concentration, depression and sleep disturbance  The patient is not nervous/anxious  Current Medications: Reviewed  Allergies: Reviewed  PMH/FH/SH:  Reviewed      Physical Exam:    Body surface area is 1 87 meters squared  Wt Readings from Last 3 Encounters:   02/24/23 73 5 kg (162 lb)   11/21/22 71 2 kg (157 lb)   08/15/22 72 1 kg (159 lb)        Temp Readings from Last 3 Encounters:   02/24/23 (!) 97 2 °F (36 2 °C) (Temporal)   11/21/22 (!) 97 °F (36 1 °C)   08/15/22 97 6 °F (36 4 °C) (Tympanic)        BP Readings from Last 3 Encounters:   02/24/23 160/90   11/21/22 130/70   08/15/22 140/80         Pulse Readings from Last 3 Encounters:   02/24/23 69   11/21/22 65   08/15/22 77        Physical Exam  Vitals reviewed  Constitutional:       General: He is not in acute distress  Appearance: He is well-developed  He is not diaphoretic  HENT:      Head: Normocephalic and atraumatic  Eyes:      Conjunctiva/sclera: Conjunctivae normal    Neck:      Trachea: No tracheal deviation  Cardiovascular:      Rate and Rhythm: Normal rate and regular rhythm  Heart sounds: No murmur heard  No friction rub  No gallop  Pulmonary:      Effort: Pulmonary effort is normal  No respiratory distress  Breath sounds: Normal breath sounds  No wheezing or rales  Chest:      Chest wall: No tenderness  Abdominal:      General: There is no distension  Palpations: Abdomen is soft  Tenderness: There is no abdominal tenderness  Musculoskeletal:      Cervical back: Normal range of motion and neck supple  Right lower leg: No edema  Left lower leg: No edema     Lymphadenopathy:      Cervical: No cervical adenopathy  Skin:     General: Skin is warm and dry  Coloration: Skin is not pale  Findings: No erythema  Neurological:      Mental Status: He is alert and oriented to person, place, and time  Psychiatric:         Behavior: Behavior normal          Thought Content: Thought content normal          Judgment: Judgment normal          ECO    Goals and Barriers:  Current Goal: Minimize effects of disease  Barriers: None  Patient's Capacity to Self Care:  Patient is able to self care      Code Status: [unfilled]

## 2023-02-24 NOTE — PROGRESS NOTES
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 with multiple retroperitoneal, pelvic lymphadenopathy, bilateral adrenal gland involvement, bilateral lung involvement and left femur head involvement status post ORIF followed by radiation therapy he received 4 cycles of ipilimumab/nivolumab as of 11/01/2018 at [address] in [facility]     He had good response with subsequent continuation of nivolumab 480 mg every month     The last PET scan on 05/23/2022 showed favorable response involving the lungs, retroperitoneal, bilateral adrenal glands primary in the right kidney    CAT scan in February 2023 showed stable left upper lobe lung nodule, bilateral small stable lung nodules await for official reading    We will refill the patient to radiation oncology to determine SBRT    Otherwise he will stay off of immunotherapy    We will follow-up in 3 months with CAT scan of the chest abdomen and pelvis, CBC, CMP, TSH    He has previous heart attack we will refer patient to cardiology        Labs and imaging studies are reviewed by ordering provider once results are available  If there are findings that need immediate attention, you will be contacted when results available  Discussing results and the implication on your healthcare is best discussed in person at your follow-up visit         HPI:    The patient is 41-year-old  male with history of multiple skin cancers, history of melanoma when he was golfing and he had pain in the left thigh area, with subsequent limbing     Bone scan on 06/2018 showed abnormal increased uptake within the left femoral neck, inter trochanteric area and proximal day of feces, MRI showed abnormal marrow signal within the left femoral neck, neck, intertrochanteric region with extension to the proximal diaphoresis with mild bone expansion and endosteal scalloping, enlarged prostate measuring 4 8 x 3 6 cm fullness of the right kidney, right kidney biopsy showed high-grade renal cell carcinoma, clear cell subtype with possibility of sarcomatoid variant, positive for vimentin, CD10, comp 5 2, negative for PAX8, melan a, S100 and TTF1     Status post embolization to the tumor of the left hip in August 2018, and left total hip arthroplasty with pathological fracture, metastatic high-grade and sarcomatoid carcinoma consistent with grade 4 renal cell carcinoma, workup showed invading of the right adrenal gland, lung, bone involvement avid on PET scan     Status post 4 cycles of ipilimumab/nivolumab as of 11/01/2018 followed by maintenance nivolumab 480 mg every 4 weeks status post 15 fraction of radiation therapy to the left area     Regarding history of melanoma on his face requiring sentinel lymph node biopsy, history of 2 areas of melanoma in the back and the leg     He had been receiving Xgeva as of 11/07/2019 on monthly basis in [place] under Dr Susie Magallanes     PET scan on 05/23/2022 showed necrotic left upper lobe lung mass measuring 2 5 x 2 4 cm with significant decrease from the initial PET scan with mild uptake nodule in the right lower lobe of the lung measuring 1 1 x 0 7 cm unchanged, nodule within the superior segment of the right lower lobe of the lung measuring 0 9 x 0 7 cm, mild uptake in the inferior pole of the right kidney measuring 1 7 x 1 6 cm, mild mesenteric, retroperitoneal, iliac, inguinal lymph node right adrenal nodule measuring 1 0 x 0 7 cm, left adrenal nodule measuring 2 2 x 1 6 cm and stable uptake in the left femoral head into the neck      He does not smoke or drink     immunotherapy induced diarrhea and colitis in July 2022 requiring discontinuation of nivolumab, CT scan however showed decrease in adrenal metastases  Interval History:        Previous Treatment:         Test Results:    Imaging: No results found  Labs:   Lab Results   Component Value Date    WBC 6 21 02/21/2023    HGB 13 4 02/21/2023    HCT 40 9 02/21/2023    MCV 98 02/21/2023     02/21/2023     Lab Results   Component Value Date    K 4 4 02/21/2023     02/21/2023    CO2 28 02/21/2023    BUN 15 02/21/2023    CREATININE 0 97 02/21/2023    CALCIUM 9 1 02/21/2023    CORRECTEDCA 9 4 08/11/2022    AST 21 02/21/2023    ALT 39 02/21/2023    ALKPHOS 81 02/21/2023    EGFR 73 02/21/2023       No results found for: IRON, TIBC, FERRITIN    No results found for: [id number]      ROS: Review of Systems   Constitutional: Negative  Negative for appetite change, chills, diaphoresis, fatigue, fever and unexpected weight change  HENT:   Negative for hearing loss, lump/mass, mouth sores, nosebleeds, sore throat, trouble swallowing and voice change  Eyes: Negative  Negative for eye problems and icterus  Respiratory: Negative  Negative for chest tightness, cough, hemoptysis and shortness of breath  Cardiovascular: Negative for chest pain and leg swelling  Gastrointestinal: Negative for abdominal distention, abdominal pain, blood in stool, constipation, diarrhea and nausea  Endocrine: Negative  Genitourinary: Negative for dysuria, frequency, hematuria and pelvic pain  Musculoskeletal: Positive for gait problem  Negative for arthralgias, back pain, flank pain, myalgias and neck stiffness  Skin: Negative for itching and rash  Neurological: Positive for gait problem  Negative for dizziness, headaches, light-headedness, numbness and speech difficulty  Hematological: Negative for adenopathy  Does not bruise/bleed easily  Psychiatric/Behavioral: Negative for confusion, decreased concentration, depression and sleep disturbance  The patient is not nervous/anxious             Current Medications: Reviewed  Allergies: Reviewed  PMH/FH/SH:  Reviewed      Physical Exam:    Body surface area is 1 87 meters squared  Wt Readings from Last 3 Encounters:   23 73 5 kg (162 lb)   22 71 2 kg (157 lb)   08/15/22 72 1 kg (159 lb)        Temp Readings from Last 3 Encounters:   23 (!) 97 2 °F (36 2 °C) (Temporal)   22 (!) 97 °F (36 1 °C)   08/15/22 97 6 °F (36 4 °C) (Tympanic)        BP Readings from Last 3 Encounters:   23 160/90   22 130/70   08/15/22 140/80         Pulse Readings from Last 3 Encounters:   23 69   22 65   08/15/22 77        Physical Exam  Vitals reviewed  Constitutional:       General: He is not in acute distress  Appearance: He is well-developed  He is not diaphoretic  HENT:      Head: Normocephalic and atraumatic  Eyes:      Conjunctiva/sclera: Conjunctivae normal    Neck:      Trachea: No tracheal deviation  Cardiovascular:      Rate and Rhythm: Normal rate and regular rhythm  Heart sounds: No murmur heard  No friction rub  No gallop  Pulmonary:      Effort: Pulmonary effort is normal  No respiratory distress  Breath sounds: Normal breath sounds  No wheezing or rales  Chest:      Chest wall: No tenderness  Abdominal:      General: There is no distension  Palpations: Abdomen is soft  Tenderness: There is no abdominal tenderness  Musculoskeletal:      Cervical back: Normal range of motion and neck supple  Right lower leg: No edema  Left lower leg: No edema  Lymphadenopathy:      Cervical: No cervical adenopathy  Skin:     General: Skin is warm and dry  Coloration: Skin is not pale  Findings: No erythema  Neurological:      Mental Status: He is alert and oriented to person, place, and time  Psychiatric:         Behavior: Behavior normal          Thought Content: Thought content normal          Judgment: Judgment normal          ECO    Goals and Barriers:  Current Goal: Minimize effects of disease  Barriers: None        Patient's Capacity to Self Care:  Patient is able to self care     Code Status: @[facility]@

## 2023-03-02 ENCOUNTER — TELEPHONE (OUTPATIENT)
Dept: HEMATOLOGY ONCOLOGY | Facility: CLINIC | Age: 78
End: 2023-03-02

## 2023-03-10 ENCOUNTER — CLINICAL SUPPORT (OUTPATIENT)
Dept: RADIATION ONCOLOGY | Facility: HOSPITAL | Age: 78
End: 2023-03-10
Attending: RADIOLOGY

## 2023-03-10 ENCOUNTER — RADIATION ONCOLOGY CONSULT (OUTPATIENT)
Dept: RADIATION ONCOLOGY | Facility: HOSPITAL | Age: 78
End: 2023-03-10
Attending: RADIOLOGY

## 2023-03-10 VITALS
BODY MASS INDEX: 24.74 KG/M2 | DIASTOLIC BLOOD PRESSURE: 78 MMHG | SYSTOLIC BLOOD PRESSURE: 156 MMHG | OXYGEN SATURATION: 99 % | HEIGHT: 68 IN | HEART RATE: 67 BPM | TEMPERATURE: 97.8 F | WEIGHT: 163.2 LBS | RESPIRATION RATE: 18 BRPM

## 2023-03-10 DIAGNOSIS — C64.1 PRIMARY MALIGNANT NEOPLASM OF RIGHT KIDNEY WITH METASTASIS FROM KIDNEY TO OTHER SITE (HCC): ICD-10-CM

## 2023-03-10 DIAGNOSIS — C64.1 PRIMARY MALIGNANT NEOPLASM OF RIGHT KIDNEY WITH METASTASIS FROM KIDNEY TO OTHER SITE (HCC): Primary | ICD-10-CM

## 2023-03-10 NOTE — PROGRESS NOTES
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 with multiple retroperitoneal, pelvic lymphadenopathy, bilateral adrenal gland involvement, bilateral lung involvement and left femur head involvement status post ORIF followed by radiation therapy he received 4 cycles of ipilimumab/nivolumab as of 11/01/2018 at [facility] in [place]  Patient had good response with subsequent continuation of nivolumab 480 mg monthly  (Nivolumab discontinued 8/22 due to immunotherapy induced colitis with subsequent diarrhea causing dehydration requiring hospitalization ) Last PET 5/23/22 showed favorable response involving the lungs, retroperitoneal, bilateral adrenal glands primary in the right kidney  CT scan 11/22 showed RLL nodule growing by 1mm to 6mm, otherwise stable disease with no new lesions  CT scan 2/23 showed stable left upper lobe lung nodule, bilateral small stable lung nodules  Patient is referred by Dr Joe Rangel for consideration of SBRT  He presents today for initial consult       7/8/22 - Hematology Oncology - Initial consult with Dr Melissa Leo therapy very well, will continue with nivolumab 480 mg flat dose every month  Cate Andino initially on monthly basis and later on space out the dose to every 3 months  CT scan of the chest abdomen and pelvis without IV or oral contrast in September 2022    8/15/22 - Hematology Oncology- Dr Perez Standing induced colitis with subsequent diarrhea, dehydration requiring admission to the hospital, currently on prednisone 20 mg p o  daily, reduce to 10 mg p o  daily in 1 week and then stay on prednisone 10 mg p o  daily for 2 weeks and then discontinue   Discontinue nivolumab  CT scan of the abdomen and pelvis in 3 months with CBC, CMP      11/7/22 - CT C/A/P wo contrast  IMPRESSION:  Mild enlargement of the right middle lobe pulmonary nodule now measuring 6 x 4 mm increasing in size since 2022 when it measured 5 x 3 mm  Given the interval increase in size, this is concerning for a metastasis, but is too small to biopsy  Consider additional 6 month follow-up  Stable left adrenal nodule  Right perinephric scarring unchanged from the prior study  Diverticulosis  22 - Hematology Oncology - Dr Hai Ware  PET scan on 2022 showed favorable response involving the lungs, retroperitoneal, bilateral adrenal glands primary in the right kidney  CT scan on 2022 showed right lower lobe lung nodule growing by 1 mm to 6 mm, otherwise stable disease no new lesions,  Will continue watchful observation and repeat CT scan in 3 month     23 - CT C/A/P wo contrast  IMPRESSION:   Chest:  Stable size of the presumed metastasis  No new concerning pulmonary nodules  Abdomen and pelvis:  Nothing to suggest progressive metastatic disease, within the confines of an examination without contrast     23 - Hematology Oncology - Dr Hai Ware  CAT scan in 2023 showed stable left upper lobe lung nodule, bilateral small stable lung nodules await for official reading  We will refill the patient to radiation oncology to determine SBRT  Otherwise he will stay off of immunotherapy  Will follow-up in 3 months with CAT scan of the chest abdomen and pelvis, CBC, CMP, TSH   Refer to cardiology for prior heart attack    Upcomin23 - CT C/A/P  23 - Hematology Oncology - Dr Hai Ware      Oncology History   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 [facility])   2022 Initial Diagnosis    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 [facility])     2022 - 2022 Chemotherapy    nivolumab (OPDIVO) IVPB, 480 mg, Intravenous, Once, 0 of 6 cycles      Chemotherapy    nivolumab (OPDIVO) IVPB, 480 mg, Intravenous, Once, 0 of 6 cycles       2023 -  Cancer Staged    Staging form: Kidney, AJCC 8th Edition  - Clinical: Stage IV (cT3b, cNX, pM1) - Signed by Gary Neff MD on 2/24/2023  Histologic grade (G): G3  Histologic grading system: 4 grade system           Review of Systems:  Review of Systems   Constitutional: Negative for appetite change and fatigue  HENT: Negative  Eyes:        Wears glasses   Respiratory: Negative for cough, shortness of breath and wheezing  Cardiovascular: Negative  Gastrointestinal: Negative  Endocrine: Negative  Genitourinary: Negative  Musculoskeletal: Negative  Skin: Negative  Bruises frequently to hands and arms   Allergic/Immunologic: Negative  Neurological: Negative  Hematological: Bruises/bleeds easily  Psychiatric/Behavioral: Negative  Clinical Trial: no    Pain assessment: 0    PFT: n/a    Prior Radiation: 2018 left femur    Teaching: [facility] radiation packet/SBRT handout    MST: completed     Implantable Devices (Port, pacemaker, pain stimulator): no    Hip Replacement: left hip replacement 2018    Health Maintenance   Topic Date Due   • Hepatitis C Screening  Never done   • Medicare Annual Wellness Visit (AWV)  Never done   • COVID-19 Vaccine (1) Never done   • Pneumococcal Vaccine: 65+ Years (1 - PCV) Never done   • Fall Risk  Never done   • Influenza Vaccine (1) Never done   • Depression Screening  03/10/2024   • BMI: Adult  03/10/2024   • HIB Vaccine  Aged Out   • IPV Vaccine  Aged Out   • Hepatitis A Vaccine  Aged Out   • Meningococcal ACWY Vaccine  Aged Out   • HPV Vaccine  Aged Out       Past Medical History:   Diagnosis Date   • Hypertension    • Kidney cancer, primary, 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 [facility])    • MI (myocardial infarction) ([facility] [address] ) 2001       Past Surgical History:   Procedure Laterality Date   • HIP SURGERY Left 2018       History reviewed  No pertinent family history      Social History     Tobacco Use   • Smoking status: Former     Packs/day: 0 50     Years: 15 00     Pack years: 7 50     Types: Cigarettes   • Smokeless tobacco: Never   • Tobacco comments:     Quit 40 years prior   Vaping Use   • Vaping Use: Never used   Substance Use Topics   • Alcohol use: Never   • Drug use: Never          Current Outpatient Medications:   •  aspirin 81 mg chewable tablet, Chew 81 mg every other day, Disp: , Rfl:   •  atenolol (TENORMIN) 25 mg tablet, Take 25 mg by mouth daily, Disp: , Rfl:   •  calcium carbonate (OS-LITO) 600 MG tablet, Take 600 mg by mouth every other day, Disp: , Rfl:   •  cholecalciferol (VITAMIN D3) 400 units tablet, Take 400 Units by mouth every other day, Disp: , Rfl:   •  fluorouracil (EFUDEX) 5 % cream, Apply topically 2 (two) times a day, Disp: 40 g, Rfl: 0  •  lisinopril (ZESTRIL) 20 mg tablet, Take 20 mg by mouth daily, Disp: , Rfl:   •  pravastatin (PRAVACHOL) 10 mg tablet, Take 10 mg by mouth daily, Disp: , Rfl:   •  ondansetron (Zofran ODT) 4 mg disintegrating tablet, Take 1 tablet (4 mg total) by mouth every 6 (six) hours as needed for nausea or vomiting (Patient not taking: Reported on 3/10/2023), Disp: 30 tablet, Rfl: 1  •  predniSONE 10 mg tablet, Take 3 tablets (30 mg total) by mouth in the morning with food (Patient not taking: Reported on 3/10/2023), Disp: 60 tablet, Rfl: 0    Allergies   Allergen Reactions   • Latex Rash        Vitals:    03/10/23 0934   BP: 156/78   BP Location: Left arm   Patient Position: Sitting   Cuff Size: Standard   Pulse: 67   Resp: 18   Temp: 97 8 °F (36 6 °C)   TempSrc: Temporal   SpO2: 99%   Weight: 74 kg (163 lb 3 2 oz)   Height: 5' 8" (1 727 m)       Pain Score: 0-No pain

## 2023-03-23 ENCOUNTER — TELEPHONE (OUTPATIENT)
Dept: HEMATOLOGY ONCOLOGY | Facility: CLINIC | Age: 78
End: 2023-03-23

## 2023-03-23 NOTE — TELEPHONE ENCOUNTER
Voicemail received:    Jonatan, my name is Inocente King, and I'm calling to speak with Val about for my  who is Jill Vasquez could  My telephone number is [phone]  Could you give me a call? We're trying to get his cardiac or meds ordered, but we cannot  He doesn't have scripts for them and we can't get in touch with or we can't get an appointment with a cardiologist in time before he runs out  Could you just give me a call? I'm trying to like say I'm trying to call Ezio Ellis  Thank you  Bye  Returned call to patients wife  Patient cannot get in to see cardiologist until end of May  His current cardiologist is in [facility]  He will run out of medication at the end of April  I instructed patients wife to contact his current cardiologist in [facility] and they will refill his medication until he can establish care with local cardiologist  She was agreeable to this plan  Erwin Rudolph was appreciative of call back

## 2023-05-12 ENCOUNTER — HOSPITAL ENCOUNTER (OUTPATIENT)
Dept: CT IMAGING | Facility: HOSPITAL | Age: 78
Discharge: HOME | End: 2023-05-12
Attending: INTERNAL MEDICINE

## 2023-05-12 DIAGNOSIS — C79.51 MALIGNANT NEOPLASM METASTATIC TO BONE (HCC): ICD-10-CM

## 2023-05-12 DIAGNOSIS — C64.1 PRIMARY MALIGNANT NEOPLASM OF RIGHT KIDNEY WITH METASTASIS FROM KIDNEY TO OTHER SITE (HCC): ICD-10-CM

## 2023-05-12 RX ADMIN — IOHEXOL 100 ML: 350 INJECTION, SOLUTION INTRAVENOUS at 12:50

## 2023-05-16 ENCOUNTER — APPOINTMENT (OUTPATIENT)
Dept: LAB | Age: 78
End: 2023-05-16

## 2023-05-16 DIAGNOSIS — C64.1 PRIMARY MALIGNANT NEOPLASM OF RIGHT KIDNEY WITH METASTASIS FROM KIDNEY TO OTHER SITE (HCC): ICD-10-CM

## 2023-05-16 DIAGNOSIS — E23.1 DRUG-INDUCED HYPOPITUITARISM (HCC): ICD-10-CM

## 2023-05-16 DIAGNOSIS — C79.51 MALIGNANT NEOPLASM METASTATIC TO BONE (HCC): ICD-10-CM

## 2023-05-16 LAB
ALBUMIN SERPL BCP-MCNC: 3.6 G/DL (ref 3.5–5)
ALP SERPL-CCNC: 78 U/L (ref 46–116)
ALT SERPL W P-5'-P-CCNC: 29 U/L (ref 12–78)
ANION GAP SERPL CALCULATED.3IONS-SCNC: -1 MMOL/L (ref 4–13)
AST SERPL W P-5'-P-CCNC: 21 U/L (ref 5–45)
BASOPHILS # BLD AUTO: 0.05 THOUSANDS/ÂΜL (ref 0–0.1)
BASOPHILS NFR BLD AUTO: 1 % (ref 0–1)
BILIRUB SERPL-MCNC: 0.53 MG/DL (ref 0.2–1)
BUN SERPL-MCNC: 13 MG/DL (ref 5–25)
CALCIUM SERPL-MCNC: 8.9 MG/DL (ref 8.3–10.1)
CHLORIDE SERPL-SCNC: 110 MMOL/L (ref 96–108)
CO2 SERPL-SCNC: 27 MMOL/L (ref 21–32)
CREAT SERPL-MCNC: 1.16 MG/DL (ref 0.6–1.3)
EOSINOPHIL # BLD AUTO: 0.14 THOUSAND/ÂΜL (ref 0–0.61)
EOSINOPHIL NFR BLD AUTO: 2 % (ref 0–6)
ERYTHROCYTE [DISTWIDTH] IN BLOOD BY AUTOMATED COUNT: 12.9 % (ref 11.6–15.1)
GFR SERPL CREATININE-BSD FRML MDRD: 59 ML/MIN/1.73SQ M
GLUCOSE SERPL-MCNC: 104 MG/DL (ref 65–140)
HCT VFR BLD AUTO: 42.9 % (ref 36.5–49.3)
HGB BLD-MCNC: 14 G/DL (ref 12–17)
IMM GRANULOCYTES # BLD AUTO: 0.03 THOUSAND/UL (ref 0–0.2)
IMM GRANULOCYTES NFR BLD AUTO: 1 % (ref 0–2)
LYMPHOCYTES # BLD AUTO: 1.65 THOUSANDS/ÂΜL (ref 0.6–4.47)
LYMPHOCYTES NFR BLD AUTO: 27 % (ref 14–44)
MCH RBC QN AUTO: 31.7 PG (ref 26.8–34.3)
MCHC RBC AUTO-ENTMCNC: 32.6 G/DL (ref 31.4–37.4)
MCV RBC AUTO: 97 FL (ref 82–98)
MONOCYTES # BLD AUTO: 0.48 THOUSAND/ÂΜL (ref 0.17–1.22)
MONOCYTES NFR BLD AUTO: 8 % (ref 4–12)
NEUTROPHILS # BLD AUTO: 3.67 THOUSANDS/ÂΜL (ref 1.85–7.62)
NEUTS SEG NFR BLD AUTO: 61 % (ref 43–75)
NRBC BLD AUTO-RTO: 0 /100 WBCS
PLATELET # BLD AUTO: 295 THOUSANDS/UL (ref 149–390)
PMV BLD AUTO: 9.5 FL (ref 8.9–12.7)
POTASSIUM SERPL-SCNC: 4.3 MMOL/L (ref 3.5–5.3)
PROT SERPL-MCNC: 6.9 G/DL (ref 6.4–8.4)
RBC # BLD AUTO: 4.41 MILLION/UL (ref 3.88–5.62)
SODIUM SERPL-SCNC: 136 MMOL/L (ref 135–147)
TSH SERPL DL<=0.05 MIU/L-ACNC: 1.61 UIU/ML (ref 0.45–4.5)
WBC # BLD AUTO: 6.02 THOUSAND/UL (ref 4.31–10.16)

## 2023-05-18 ENCOUNTER — TELEPHONE (OUTPATIENT)
Dept: HEMATOLOGY ONCOLOGY | Facility: CLINIC | Age: 78
End: 2023-05-18

## 2023-05-18 NOTE — TELEPHONE ENCOUNTER
Radiology calling with significant results of CT scan from 5/12: IMPRESSION:        1  Stable lingular nodule measuring 2 6 x 2 7 x 2 4 cm compared to February 17, 2023, presumed to represent metastatic disease  Additional few subcentimeter nodules that are also stable  No new or enlarging pulmonary nodules      2  One or more subcentimeter sharply circumscribed low-density hepatic lesion(s) are noted, too small to accurately characterize, and previously not seen due to lack of intravenous contrast  Recommend attention to on subsequent follow-up      3  Two indeterminate low-attenuation lesions in the right kidney, one in the upper pole, and another in the lower pole, which may represent posttreatment changes, although recurrent disease is not excluded  These are not well assessed on the prior   noncontrast CT examinations  Recommend follow-up CT or MRI with contrast in 3 months for reassessment      4  Stable 8 mm lucent lesion involving the T6 vertebral body, which has features of a benign hemangioma, but recommend attention to on subsequent follow-up given history of renal malignancy      The study was marked in EPIC for significant notification  Patient has scheduled follow up with Dr Gavin Conde on 5/22  Dr Gavin Conde will review results at appointment

## 2023-05-22 ENCOUNTER — OFFICE VISIT (OUTPATIENT)
Dept: HEMATOLOGY ONCOLOGY | Facility: CLINIC | Age: 78
End: 2023-05-22

## 2023-05-22 VITALS
BODY MASS INDEX: 25.31 KG/M2 | TEMPERATURE: 97.3 F | SYSTOLIC BLOOD PRESSURE: 162 MMHG | DIASTOLIC BLOOD PRESSURE: 92 MMHG | RESPIRATION RATE: 16 BRPM | WEIGHT: 167 LBS | HEIGHT: 68 IN | HEART RATE: 64 BPM | OXYGEN SATURATION: 98 %

## 2023-05-22 DIAGNOSIS — C64.1 PRIMARY MALIGNANT NEOPLASM OF RIGHT KIDNEY WITH METASTASIS FROM KIDNEY TO OTHER SITE (HCC): Primary | ICD-10-CM

## 2023-05-22 PROBLEM — R19.7 DIARRHEA: Status: RESOLVED | Noted: 2022-07-29 | Resolved: 2023-05-22

## 2023-05-22 PROBLEM — N17.9 AKI (ACUTE KIDNEY INJURY) (HCC): Status: RESOLVED | Noted: 2022-07-29 | Resolved: 2023-05-22

## 2023-05-22 NOTE — PROGRESS NOTES
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 with multiple retroperitoneal, pelvic lymphadenopathy, bilateral adrenal gland involvement, bilateral lung involvement and left femur head involvement status post ORIF followed by radiation therapy he received 4 cycles of ipilimumab/nivolumab as of 11/01/2018 at [address] in [facility]     He had good response with subsequent continuation of nivolumab 480 mg every month     The last PET scan on 05/23/2022 showed favorable response involving the lungs, retroperitoneal, bilateral adrenal glands primary in the right kidney     CAT scan in February 2023 showed stable left upper lobe lung nodule, bilateral small stable lung nodules    CAT scan in May 2023 showed stable disease no new lesions    Evaluated by radiation oncology and the decision not to proceed with any radiation therapy at this time    Follow-up in 4 months with CAT scan without contrast, CBC, CMP    Skin lesions consistent with squamous cell carcinoma of the scalp resolved on 5-FU             Labs and imaging studies are reviewed by ordering provider once results are available  If there are findings that need immediate attention, you will be contacted when results available  Discussing results and the implication on your healthcare is best discussed in person at your follow-up visit         HPI:    The patient is 18-year-old  male with history of multiple skin cancers, history of melanoma when he was golfing and he had pain in the left thigh area, with subsequent limbing     Bone scan on 06/2018 showed abnormal increased uptake within the left femoral neck, inter trochanteric area and proximal day of feces, MRI showed abnormal marrow signal within the left femoral neck, neck, intertrochanteric region with extension to the proximal diaphoresis with mild bone expansion and endosteal scalloping, enlarged prostate measuring 4 8 x 3 6 cm fullness of the right kidney, right kidney biopsy showed high-grade renal cell carcinoma, clear cell subtype with possibility of sarcomatoid variant, positive for vimentin, CD10, comp 5 2, negative for PAX8, melan a, S100 and TTF1     Status post embolization to the tumor of the left hip in August 2018, and left total hip arthroplasty with pathological fracture, metastatic high-grade and sarcomatoid carcinoma consistent with grade 4 renal cell carcinoma, workup showed invading of the right adrenal gland, lung, bone involvement avid on PET scan     Status post 4 cycles of ipilimumab/nivolumab as of 11/01/2018 followed by maintenance nivolumab 480 mg every 4 weeks status post 15 fraction of radiation therapy to the left area     Regarding history of melanoma on his face requiring sentinel lymph node biopsy, history of 2 areas of melanoma in the back and the leg     He had been receiving Xgeva as of 11/07/2019 on monthly basis in [place] under Dr Kailash Rodriguez     PET scan on 05/23/2022 showed necrotic left upper lobe lung mass measuring 2 5 x 2 4 cm with significant decrease from the initial PET scan with mild uptake nodule in the right lower lobe of the lung measuring 1 1 x 0 7 cm unchanged, nodule within the superior segment of the right lower lobe of the lung measuring 0 9 x 0 7 cm, mild uptake in the inferior pole of the right kidney measuring 1 7 x 1 6 cm, mild mesenteric, retroperitoneal, iliac, inguinal lymph node right adrenal nodule measuring 1 0 x 0 7 cm, left adrenal nodule measuring 2 2 x 1 6 cm and stable uptake in the left femoral head into the neck      He does not smoke or drink     immunotherapy induced diarrhea and colitis in July 2022 requiring discontinuation of nivolumab, CT scan however showed decrease in adrenal metastases      Evaluated by radiation oncology at this time no need for radiation    CAT scan in May 2023 showed stable disease no new lesions  Interval History:        Previous Treatment:         Test Results:    Imaging: CT chest abdomen pelvis w contrast    Result Date: 5/18/2023  Narrative: CT CHEST, ABDOMEN AND PELVIS WITH IV CONTRAST INDICATION:   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 C64 1: Malignant neoplasm of right kidney, except renal pelvis  COMPARISON: Multiple priors, recently a CT from 2/17/2023  PET/CT from 7/6/2018 d TECHNIQUE: CT examination of the chest, abdomen and pelvis was performed  Multiplanar 2D reformatted images were created from the source data  This examination, like all CT scans performed in the [facility], was performed utilizing techniques to minimize radiation dose exposure, including the use of iterative reconstruction and automated exposure control  Radiation dose length product (DLP) for this visit:  668 mGy-cm IV Contrast:  100 mL of iohexol (OMNIPAQUE) Enteric Contrast: Enteric contrast was administered  FINDINGS: CHEST LUNGS: Stable lingular nodule measuring 2 6 x 2 7 x 2 4 cm compared to February 17, 2023  There is adjacent linear scarring/subsegmental atelectasis  Stable 7 mm nodule in the middle lobe (series 4, image 165)  Stable 4 mm left lower lobe nodule (series 4, image 163)  Stable linear scarring in the posterior right lower lobe and in the anterior upper lobes  Punctate calcified granulomas are present  PLEURA:  Unremarkable  HEART/GREAT VESSELS: Normal heart size  Moderate coronary artery calcification  No thoracic aortic aneurysm  MEDIASTINUM AND KASEY:  Unremarkable  CHEST WALL AND LOWER NECK:  Unremarkable  ABDOMEN LIVER/BILIARY TREE:  One or more subcentimeter sharply circumscribed low-density hepatic lesion(s) are noted, too small to accurately characterize, and previously not seen due to lack of intravenous contrast  Recommend attention to on subsequent follow-up    Hepatic contours are normal   No biliary dilatation  GALLBLADDER:  No calcified gallstones  No pericholecystic inflammatory change  SPLEEN:  Unremarkable  PANCREAS: Atrophic pancreas  No ductal dilatation  ADRENAL GLANDS: Similar size 1 7 cm left adrenal nodule with calcification, compatible with the sequela of a prior inflammatory process  Punctate calcification in the right adrenal gland, also likely related to prior inflammation  KIDNEYS/URETERS: 1 1 x 2 3 x 0 7 cm (series 2, image 124; coronal image 98) area of low-attenuation within the upper pole of the right kidney extending medially, similar in size compared to the noncontrast CT of 2/17/2023 but appears slightly more prominent than 7/25/2022  1 1 x 1 2 x 1 0 cm area of low-attenuation within the lower pole of the right kidney (series 2, image 150; coronal image 90) with adjacent cortical scarring; this area was not well assessed on prior studies due to lack of intravenous contrast on those exams  Left upper pole renal cyst  No hydronephrosis  STOMACH AND BOWEL: Multiple diverticula in the sigmoid colon  No diverticulitis  No mass  No bowel obstruction  APPENDIX:  No findings to suggest appendicitis  ABDOMINOPELVIC CAVITY:  No ascites  No pneumoperitoneum  No lymphadenopathy  VESSELS: Atherosclerotic changes of the aorta and branching vessels  PELVIS REPRODUCTIVE ORGANS: Mildly enlarged prostate gland  URINARY BLADDER:  Unremarkable  ABDOMINAL WALL/INGUINAL REGIONS:  Unremarkable  OSSEOUS STRUCTURES: Degenerative changes of the spine and hips  Left hip arthroplasty with surrounding streak artifact  8 mm lucent lesion involving the posterior aspect of T6 (sagittal image 100), stable from 2/17/2023 when reviewed and directed retrospect  This has features of a hemangioma on axial imaging, but given history of renal malignancy recommend attention to on follow-up  No cortical destruction  Recommend attention to on subsequent follow-up  Impression: 1   Stable lingular nodule measuring 2 6 x 2 7 x 2 4 cm compared to February 17, 2023, presumed to represent metastatic disease  Additional few subcentimeter nodules that are also stable  No new or enlarging pulmonary nodules  2  One or more subcentimeter sharply circumscribed low-density hepatic lesion(s) are noted, too small to accurately characterize, and previously not seen due to lack of intravenous contrast  Recommend attention to on subsequent follow-up  3  Two indeterminate low-attenuation lesions in the right kidney, one in the upper pole, and another in the lower pole, which may represent posttreatment changes, although recurrent disease is not excluded  These are not well assessed on the prior noncontrast CT examinations  Recommend follow-up CT or MRI with contrast in 3 months for reassessment  4  Stable 8 mm lucent lesion involving the T6 vertebral body, which has features of a benign hemangioma, but recommend attention to on subsequent follow-up given history of renal malignancy  The study was marked in EPIC for significant notification  Workstation performed: [id number]       Labs:   Lab Results   Component Value Date    WBC 6 02 05/16/2023    HGB 14 0 05/16/2023    HCT 42 9 05/16/2023    MCV 97 05/16/2023     05/16/2023     Lab Results   Component Value Date    K 4 3 05/16/2023     (H) 05/16/2023    CO2 27 05/16/2023    BUN 13 05/16/2023    CREATININE 1 16 05/16/2023    CALCIUM 8 9 05/16/2023    CORRECTEDCA 9 4 08/11/2022    AST 21 05/16/2023    ALT 29 05/16/2023    ALKPHOS 78 05/16/2023    EGFR 59 05/16/2023       No results found for: IRON, TIBC, FERRITIN    No results found for: [id number]      ROS: Review of Systems   Constitutional: Negative  Negative for appetite change, chills, diaphoresis, fatigue, fever and unexpected weight change  HENT:   Negative for hearing loss, lump/mass, mouth sores, nosebleeds, sore throat, trouble swallowing and voice change  Eyes: Negative    Negative for eye problems and icterus  Respiratory: Negative  Negative for chest tightness, cough, hemoptysis and shortness of breath  Cardiovascular: Negative for chest pain and leg swelling  Gastrointestinal: Negative for abdominal distention, abdominal pain, blood in stool, constipation, diarrhea and nausea  Endocrine: Negative  Genitourinary: Negative for dysuria, frequency, hematuria and pelvic pain  Musculoskeletal: Negative  Negative for arthralgias, back pain, flank pain, gait problem, myalgias and neck stiffness  Skin: Negative for itching and rash  Neurological: Negative for dizziness, gait problem, headaches, light-headedness, numbness and speech difficulty  Hematological: Negative for adenopathy  Does not bruise/bleed easily  Psychiatric/Behavioral: Negative for confusion, decreased concentration, depression and sleep disturbance  The patient is not nervous/anxious  Current Medications: Reviewed  Allergies: Reviewed  PMH/FH/SH:  Reviewed      Physical Exam:    Body surface area is 1 89 meters squared  Wt Readings from Last 3 Encounters:   05/22/23 75 8 kg (167 lb)   03/10/23 74 kg (163 lb 3 2 oz)   02/24/23 73 5 kg (162 lb)        Temp Readings from Last 3 Encounters:   05/22/23 (!) 97 3 °F (36 3 °C) (Temporal)   03/10/23 97 8 °F (36 6 °C) (Temporal)   02/24/23 (!) 97 2 °F (36 2 °C) (Temporal)        BP Readings from Last 3 Encounters:   05/22/23 162/92   03/10/23 156/78   02/24/23 160/90         Pulse Readings from Last 3 Encounters:   05/22/23 64   03/10/23 67   02/24/23 69        Physical Exam  Vitals reviewed  Constitutional:       General: He is not in acute distress  Appearance: He is well-developed  He is not diaphoretic  HENT:      Head: Normocephalic and atraumatic  Eyes:      Conjunctiva/sclera: Conjunctivae normal    Neck:      Trachea: No tracheal deviation  Cardiovascular:      Rate and Rhythm: Normal rate and regular rhythm  Heart sounds: No murmur heard      No friction rub  No gallop  Pulmonary:      Effort: Pulmonary effort is normal  No respiratory distress  Breath sounds: Normal breath sounds  No wheezing or rales  Chest:      Chest wall: No tenderness  Abdominal:      General: There is no distension  Palpations: Abdomen is soft  Tenderness: There is no abdominal tenderness  Musculoskeletal:      Cervical back: Normal range of motion and neck supple  Lymphadenopathy:      Cervical: No cervical adenopathy  Skin:     General: Skin is warm and dry  Coloration: Skin is not pale  Findings: No erythema  Neurological:      Mental Status: He is alert and oriented to person, place, and time  Psychiatric:         Behavior: Behavior normal          Thought Content: Thought content normal          Judgment: Judgment normal          ECOG:    Goals and Barriers:  Current Goal: Minimize effects of disease  Barriers: None  Patient's Capacity to Self Care:  Patient is able to self care      Code Status: @[facility]@

## 2023-06-14 ENCOUNTER — OFFICE VISIT (OUTPATIENT)
Dept: CARDIOLOGY CLINIC | Facility: CLINIC | Age: 78
End: 2023-06-14
Payer: MEDICARE

## 2023-06-14 VITALS
HEIGHT: 68 IN | BODY MASS INDEX: 25.61 KG/M2 | SYSTOLIC BLOOD PRESSURE: 182 MMHG | OXYGEN SATURATION: 96 % | WEIGHT: 169 LBS | DIASTOLIC BLOOD PRESSURE: 98 MMHG | HEART RATE: 61 BPM

## 2023-06-14 DIAGNOSIS — I25.10 CORONARY ARTERY DISEASE INVOLVING NATIVE CORONARY ARTERY OF NATIVE HEART WITHOUT ANGINA PECTORIS: Primary | ICD-10-CM

## 2023-06-14 DIAGNOSIS — I49.3 PVC'S (PREMATURE VENTRICULAR CONTRACTIONS): ICD-10-CM

## 2023-06-14 DIAGNOSIS — I10 PRIMARY HYPERTENSION: ICD-10-CM

## 2023-06-14 DIAGNOSIS — I25.2 OLD MYOCARDIAL INFARCTION: ICD-10-CM

## 2023-06-14 DIAGNOSIS — E78.00 HYPERCHOLESTEROLEMIA: ICD-10-CM

## 2023-06-14 PROCEDURE — 93000 ELECTROCARDIOGRAM COMPLETE: CPT | Performed by: INTERNAL MEDICINE

## 2023-06-14 PROCEDURE — 99204 OFFICE O/P NEW MOD 45 MIN: CPT | Performed by: INTERNAL MEDICINE

## 2023-06-14 NOTE — PROGRESS NOTES
Cardiology Consultation     Anita Jama  [id number]  1945  [facility] [place]  [address] [place] [address] [address] [facility] [id number]    1  Coronary artery disease involving native coronary artery of native heart without angina pectoris  Echo complete w/ contrast if indicated      2  Old myocardial infarction  Ambulatory referral to Cardiology    POCT ECG    Echo complete w/ contrast if indicated      3  Primary hypertension  Echo complete w/ contrast if indicated      4  Hypercholesterolemia  Lipid Panel With Direct LDL      5  PVC's (premature ventricular contractions)              Discussion/Summary:    1  CAD - Yo Jack tells me he had a myocardial infarction in 2001 that was treated medically  His last ischemic evaluation that I have is from 2017 with a normal pharmacologic perfusion scan  No changes were made to his medical therapy today but we are going to be following his risk factors closely  We did order an updated echocardiogram     2   Hypertension - His blood pressure is elevated and he tells me this happens in doctors offices  He is not checking it at home however  I asked him to start checking this at home and we will discuss in the near future changes in treatment if it runs high at home  3   Hypercholesterolemia - He is on a low-dose statin with pravastatin 10 mg daily  We ordered updated blood work  We will see him back in the office in 6 months  4  PVCs - Seen on ECG and heard on exam   He is asymptomatic  No changes were made but we may need to uptitrate beta-blocker if his blood pressure is running high chronically  HPI:    My Luna Braxton comes in for consultation to establish care given his cardiac history  I do not have many records of his past cardiac history, and the records I do have are somewhat scant    By his history he had a myocardial infarction in 2001 and does not recall ever receiving stenting or even having a cardiac catheterization  He has been on the same medical therapy since then has had no other issues from a cardiac standpoint  The last ischemic evaluation I have is from 2017 in which she had a normal pharmacologic nuclear stress test   He tells me he had his MI in [place] but has been most recently following with cardiology out of [place] as he moved there  He also follows closely with oncology as he has a 5-year history of stage IV renal cell carcinoma  Overall [facility] feels well  He denies any current cardiac symptoms  No chest pain or any symptoms of angina  No shortness of breath or any signs/symptoms of CHF  He denies palpitations, lightheadedness or syncope  His blood pressure is running high and he tells me that his blood pressure does run high in physician offices but he does not check this at home  We do not have a recent lipid panel        Patient Active Problem List   Diagnosis   • Bone metastases   • 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 [facility])   • Hypertension   • [facility]   • Coronary artery disease involving native coronary artery of native heart without angina pectoris   • Hypercholesterolemia   • PVC's (premature ventricular contractions)     Past Medical History:   Diagnosis Date   • Hypertension    • Kidney cancer, primary, 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 [facility])    • [facility]     Social History     Socioeconomic History   • Marital status: /Civil Union     Spouse name: Not on file   • Number of children: Not on file   • Years of education: Not on file   • Highest education level: Not on file   Occupational History   • Not on file   Tobacco Use   • Smoking status: Former     Packs/day: 0 50     Years: 15 00     Total pack years: 7 50     Types: Cigarettes   • Smokeless tobacco: Never   • Tobacco comments:     Quit 40 years prior   Vaping Use   • "Vaping Use: Never used   Substance and Sexual Activity   • Alcohol use: Never   • Drug use: Never   • Sexual activity: Not Currently   Other Topics Concern   • Not on file   Social History Narrative   • Not on file     Social Determinants of Health     Financial Resource Strain: Not on file   Food Insecurity: Not on file   Transportation Needs: Not on file   Physical Activity: Not on file   Stress: Not on file   Social Connections: Not on file   Intimate Partner Violence: Not on file   Housing Stability: Not on file      History reviewed  No pertinent family history  Past Surgical History:   Procedure Laterality Date   • HIP SURGERY Left 2018       Current Outpatient Medications:   •  aspirin 81 mg chewable tablet, Chew 81 mg every other day, Disp: , Rfl:   •  atenolol (TENORMIN) 25 mg tablet, Take 25 mg by mouth daily, Disp: , Rfl:   •  calcium carbonate (OS-LITO) 600 MG tablet, Take 600 mg by mouth every other day, Disp: , Rfl:   •  cholecalciferol (VITAMIN D3) 400 units tablet, Take 400 Units by mouth every other day, Disp: , Rfl:   •  lisinopril (ZESTRIL) 20 mg tablet, Take 20 mg by mouth daily, Disp: , Rfl:   •  pravastatin (PRAVACHOL) 10 mg tablet, Take 10 mg by mouth daily, Disp: , Rfl:   Allergies   Allergen Reactions   • Latex Rash     Vitals:    06/14/23 1011   BP: (!) 182/98   BP Location: Left arm   Patient Position: Sitting   Cuff Size: Standard   Pulse: 61   SpO2: 96%   Weight: 76 7 kg (169 lb)   Height: 5' 8\" (1 727 m)       Labs:  Lab Results   Component Value Date    BUN 13 05/16/2023    CALCIUM 8 9 05/16/2023     (H) 05/16/2023    CO2 27 05/16/2023    CREATININE 1 16 05/16/2023    K 4 3 05/16/2023     Lab Results   Component Value Date    HCT 42 9 05/16/2023    HGB 14 0 05/16/2023    MCV 97 05/16/2023     05/16/2023    WBC 6 02 05/16/2023       Imaging: ECG today shows sinus rhythm with occasional PVCs, otherwise normal ECG      Review of Systems:  Review of Systems   Constitutional: " "Negative  HENT: Negative  Eyes: Negative  Respiratory: Negative  Cardiovascular: Negative  Gastrointestinal: Negative  Musculoskeletal: Negative  Skin: Negative  Allergic/Immunologic: Negative  Neurological: Negative  Hematological: Negative  Psychiatric/Behavioral: Negative  All other systems reviewed and are negative  Vitals:    06/14/23 1011   BP: (!) 182/98   BP Location: Left arm   Patient Position: Sitting   Cuff Size: Standard   Pulse: 61   SpO2: 96%   Weight: 76 7 kg (169 lb)   Height: 5' 8\" (1 727 m)       Physical Exam:  Physical Exam  Vitals and nursing note reviewed  Constitutional:       Appearance: He is well-developed  HENT:      Head: Normocephalic and atraumatic  Eyes:      General: No scleral icterus  Right eye: No discharge  Left eye: No discharge  Pupils: Pupils are equal, round, and reactive to light  Neck:      Thyroid: No thyromegaly  Vascular: No JVD  Cardiovascular:      Rate and Rhythm: Normal rate and regular rhythm  Extrasystoles are present  Pulses: Normal pulses  No decreased pulses  Heart sounds: Normal heart sounds, S1 normal and S2 normal  No murmur heard  No friction rub  No gallop  Pulmonary:      Effort: Pulmonary effort is normal  No respiratory distress  Breath sounds: Normal breath sounds  No wheezing, rhonchi or rales  Abdominal:      General: Bowel sounds are normal  There is no distension  Palpations: Abdomen is soft  Tenderness: There is no abdominal tenderness  Musculoskeletal:         General: No tenderness or deformity  Normal range of motion  Cervical back: Normal range of motion and neck supple  Skin:     General: Skin is warm and dry  Findings: No rash  Neurological:      Mental Status: He is alert and oriented to person, place, and time  Cranial Nerves: No cranial nerve deficit  Psychiatric:         Thought Content:  Thought content normal   " Judgment: Judgment normal        Counseling / Coordination of Care  Total office time spent today 40 minutes  Greater than 50% of total time was spent with the patient and / or family counseling and / or coordination of care

## 2023-07-06 ENCOUNTER — TELEPHONE (OUTPATIENT)
Dept: CARDIOLOGY CLINIC | Facility: CLINIC | Age: 78
End: 2023-07-06

## 2023-07-06 DIAGNOSIS — I10 PRIMARY HYPERTENSION: Primary | ICD-10-CM

## 2023-07-06 NOTE — TELEPHONE ENCOUNTER
Patient dropped off a list of his blood pressures last week for Dr. Adeline Singh to review. He has not heard anything back and is running out of medications and would like a return call in regards to what he should be doing about BP medication.   Please call

## 2023-07-07 RX ORDER — LISINOPRIL 20 MG/1
20 TABLET ORAL 2 TIMES DAILY
Qty: 180 TABLET | Refills: 3 | Status: SHIPPED | OUTPATIENT
Start: 2023-07-07

## 2023-07-14 ENCOUNTER — HOSPITAL ENCOUNTER (OUTPATIENT)
Dept: NON INVASIVE DIAGNOSTICS | Facility: CLINIC | Age: 78
Discharge: HOME | End: 2023-07-14
Payer: MEDICARE

## 2023-07-14 VITALS
BODY MASS INDEX: 25.63 KG/M2 | SYSTOLIC BLOOD PRESSURE: 162 MMHG | WEIGHT: 169.09 LBS | HEIGHT: 68 IN | DIASTOLIC BLOOD PRESSURE: 92 MMHG | HEART RATE: 50 BPM

## 2023-07-14 DIAGNOSIS — I25.10 CORONARY ARTERY DISEASE INVOLVING NATIVE CORONARY ARTERY OF NATIVE HEART WITHOUT ANGINA PECTORIS: ICD-10-CM

## 2023-07-14 DIAGNOSIS — I10 PRIMARY HYPERTENSION: ICD-10-CM

## 2023-07-14 DIAGNOSIS — I25.2 OLD MYOCARDIAL INFARCTION: ICD-10-CM

## 2023-07-14 LAB
AORTIC ROOT: 3.4 CM
APICAL FOUR CHAMBER EJECTION FRACTION: 68 %
ASCENDING AORTA: 3.2 CM
E WAVE DECELERATION TIME: 179 MS
FRACTIONAL SHORTENING: 30 % (ref 28–44)
INTERVENTRICULAR SEPTUM IN DIASTOLE (PARASTERNAL SHORT AXIS VIEW): 1 CM
INTERVENTRICULAR SEPTUM: 1 CM (ref 0.6–1.1)
LAAS-AP2: 20.7 CM2
LAAS-AP4: 17.2 CM2
LEFT ATRIUM AREA SYSTOLE SINGLE PLANE A4C: 17.5 CM2
LEFT ATRIUM SIZE: 3.5 CM
LEFT ATRIUM VOLUME (MOD BIPLANE): 60 ML
LEFT INTERNAL DIMENSION IN SYSTOLE: 2.8 CM (ref 2.1–4)
LEFT VENTRICULAR INTERNAL DIMENSION IN DIASTOLE: 4 CM (ref 3.5–6)
LEFT VENTRICULAR POSTERIOR WALL IN END DIASTOLE: 1 CM
LEFT VENTRICULAR STROKE VOLUME: 41 ML
LVSV (TEICH): 41 ML
MV E'TISSUE VEL-SEP: 8 CM/S
MV PEAK A VEL: 0.95 M/S
MV PEAK E VEL: 78 CM/S
MV STENOSIS PRESSURE HALF TIME: 52 MS
MV VALVE AREA P 1/2 METHOD: 4.23 CM2
RIGHT ATRIUM AREA SYSTOLE A4C: 10.8 CM2
RIGHT VENTRICLE ID DIMENSION: 3.1 CM
SL CV LEFT ATRIUM LENGTH A2C: 5.3 CM
SL CV LV EF: 60
SL CV PED ECHO LEFT VENTRICLE DIASTOLIC VOLUME (MOD BIPLANE) 2D: 72 ML
SL CV PED ECHO LEFT VENTRICLE SYSTOLIC VOLUME (MOD BIPLANE) 2D: 31 ML
TRICUSPID ANNULAR PLANE SYSTOLIC EXCURSION: 2.6 CM

## 2023-07-14 PROCEDURE — 93306 TTE W/DOPPLER COMPLETE: CPT | Performed by: INTERNAL MEDICINE

## 2023-07-14 PROCEDURE — 93306 TTE W/DOPPLER COMPLETE: CPT

## 2023-07-18 DIAGNOSIS — E78.00 HYPERCHOLESTEROLEMIA: ICD-10-CM

## 2023-07-18 DIAGNOSIS — I10 PRIMARY HYPERTENSION: Primary | ICD-10-CM

## 2023-07-18 RX ORDER — PRAVASTATIN SODIUM 10 MG
10 TABLET ORAL DAILY
Qty: 90 TABLET | Refills: 3 | Status: SHIPPED | OUTPATIENT
Start: 2023-07-18

## 2023-07-24 DIAGNOSIS — I10 PRIMARY HYPERTENSION: Primary | ICD-10-CM

## 2023-07-24 RX ORDER — ATENOLOL 25 MG/1
25 TABLET ORAL DAILY
Qty: 90 TABLET | Refills: 3 | Status: SHIPPED | OUTPATIENT
Start: 2023-07-24

## 2023-09-15 ENCOUNTER — HOSPITAL ENCOUNTER (OUTPATIENT)
Dept: CT IMAGING | Facility: HOSPITAL | Age: 78
Discharge: HOME | End: 2023-09-15
Attending: INTERNAL MEDICINE
Payer: MEDICARE

## 2023-09-15 DIAGNOSIS — C64.1 PRIMARY MALIGNANT NEOPLASM OF RIGHT KIDNEY WITH METASTASIS FROM KIDNEY TO OTHER SITE (HCC): ICD-10-CM

## 2023-09-15 PROCEDURE — 71250 CT THORAX DX C-: CPT

## 2023-09-15 PROCEDURE — G1004 CDSM NDSC: HCPCS

## 2023-09-15 PROCEDURE — 74176 CT ABD & PELVIS W/O CONTRAST: CPT

## 2023-09-19 ENCOUNTER — APPOINTMENT (OUTPATIENT)
Dept: LAB | Age: 78
End: 2023-09-19
Payer: MEDICARE

## 2023-09-19 DIAGNOSIS — C64.1 PRIMARY MALIGNANT NEOPLASM OF RIGHT KIDNEY WITH METASTASIS FROM KIDNEY TO OTHER SITE (HCC): ICD-10-CM

## 2023-09-19 LAB
ALBUMIN SERPL BCP-MCNC: 4.3 G/DL (ref 3.5–5)
ALP SERPL-CCNC: 67 U/L (ref 34–104)
ALT SERPL W P-5'-P-CCNC: 21 U/L (ref 7–52)
ANION GAP SERPL CALCULATED.3IONS-SCNC: 7 MMOL/L
AST SERPL W P-5'-P-CCNC: 23 U/L (ref 13–39)
BASOPHILS # BLD AUTO: 0.04 THOUSANDS/ÂΜL (ref 0–0.1)
BASOPHILS NFR BLD AUTO: 1 % (ref 0–1)
BILIRUB SERPL-MCNC: 0.6 MG/DL (ref 0.2–1)
BUN SERPL-MCNC: 16 MG/DL (ref 5–25)
CALCIUM SERPL-MCNC: 9.7 MG/DL (ref 8.4–10.2)
CHLORIDE SERPL-SCNC: 102 MMOL/L (ref 96–108)
CO2 SERPL-SCNC: 29 MMOL/L (ref 21–32)
CREAT SERPL-MCNC: 1.31 MG/DL (ref 0.6–1.3)
EOSINOPHIL # BLD AUTO: 0.1 THOUSAND/ÂΜL (ref 0–0.61)
EOSINOPHIL NFR BLD AUTO: 2 % (ref 0–6)
ERYTHROCYTE [DISTWIDTH] IN BLOOD BY AUTOMATED COUNT: 12.5 % (ref 11.6–15.1)
GFR SERPL CREATININE-BSD FRML MDRD: 51 ML/MIN/1.73SQ M
GLUCOSE P FAST SERPL-MCNC: 90 MG/DL (ref 65–99)
HCT VFR BLD AUTO: 44 % (ref 36.5–49.3)
HGB BLD-MCNC: 14.7 G/DL (ref 12–17)
IMM GRANULOCYTES # BLD AUTO: 0.03 THOUSAND/UL (ref 0–0.2)
IMM GRANULOCYTES NFR BLD AUTO: 1 % (ref 0–2)
LYMPHOCYTES # BLD AUTO: 1.63 THOUSANDS/ÂΜL (ref 0.6–4.47)
LYMPHOCYTES NFR BLD AUTO: 28 % (ref 14–44)
MCH RBC QN AUTO: 33.1 PG (ref 26.8–34.3)
MCHC RBC AUTO-ENTMCNC: 33.4 G/DL (ref 31.4–37.4)
MCV RBC AUTO: 99 FL (ref 82–98)
MONOCYTES # BLD AUTO: 0.54 THOUSAND/ÂΜL (ref 0.17–1.22)
MONOCYTES NFR BLD AUTO: 9 % (ref 4–12)
NEUTROPHILS # BLD AUTO: 3.46 THOUSANDS/ÂΜL (ref 1.85–7.62)
NEUTS SEG NFR BLD AUTO: 59 % (ref 43–75)
NRBC BLD AUTO-RTO: 0 /100 WBCS
PLATELET # BLD AUTO: 287 THOUSANDS/UL (ref 149–390)
PMV BLD AUTO: 9.2 FL (ref 8.9–12.7)
POTASSIUM SERPL-SCNC: 4.8 MMOL/L (ref 3.5–5.3)
PROT SERPL-MCNC: 6.8 G/DL (ref 6.4–8.4)
RBC # BLD AUTO: 4.44 MILLION/UL (ref 3.88–5.62)
SODIUM SERPL-SCNC: 138 MMOL/L (ref 135–147)
WBC # BLD AUTO: 5.8 THOUSAND/UL (ref 4.31–10.16)

## 2023-09-19 PROCEDURE — 36415 COLL VENOUS BLD VENIPUNCTURE: CPT

## 2023-09-19 PROCEDURE — 85025 COMPLETE CBC W/AUTO DIFF WBC: CPT

## 2023-09-19 PROCEDURE — 80053 COMPREHEN METABOLIC PANEL: CPT

## 2023-09-22 ENCOUNTER — OFFICE VISIT (OUTPATIENT)
Dept: HEMATOLOGY ONCOLOGY | Facility: CLINIC | Age: 78
End: 2023-09-22
Payer: MEDICARE

## 2023-09-22 VITALS
OXYGEN SATURATION: 97 % | WEIGHT: 171 LBS | HEART RATE: 53 BPM | SYSTOLIC BLOOD PRESSURE: 162 MMHG | TEMPERATURE: 97 F | BODY MASS INDEX: 25.91 KG/M2 | DIASTOLIC BLOOD PRESSURE: 70 MMHG | HEIGHT: 68 IN

## 2023-09-22 DIAGNOSIS — C64.1 PRIMARY MALIGNANT NEOPLASM OF RIGHT KIDNEY WITH METASTASIS FROM KIDNEY TO OTHER SITE (HCC): Primary | ICD-10-CM

## 2023-09-22 DIAGNOSIS — N18.31 STAGE 3A CHRONIC KIDNEY DISEASE (HCC): ICD-10-CM

## 2023-09-22 PROCEDURE — 99214 OFFICE O/P EST MOD 30 MIN: CPT | Performed by: INTERNAL MEDICINE

## 2023-09-22 NOTE — PROGRESS NOTES
Hematology Outpatient Follow - Up Note  Mirian Haas 78 y.o. 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 with multiple retroperitoneal, pelvic lymphadenopathy, bilateral adrenal gland involvement, bilateral lung involvement and left femur head involvement status post ORIF followed by radiation therapy he received 4 cycles of ipilimumab/nivolumab as of 11/01/2018 at [facility] in [facility]     He had good response with subsequent continuation of nivolumab 480 mg every month     The last PET scan on 05/23/2022 showed favorable response involving the lungs, retroperitoneal, bilateral adrenal glands primary in the right kidney     CAT scan in February 2023 showed stable left upper lobe lung nodule, bilateral small stable lung nodules     CAT scan in May 2023 showed stable disease no new lesions    CAT scan in September 2023 showed stable disease no new lesions as well    Follow-up in 4 to 5 months with CAT scan of the chest abdomen the pelvis without contrast, CBC, CMP    Squamous cell carcinoma of the scalp, resolved by 5-FU he was advised to follow-up with dermatology        Labs and imaging studies are reviewed by ordering provider once results are available. If there are findings that need immediate attention, you will be contacted when results available. Discussing results and the implication on your healthcare is best discussed in person at your follow-up visit.        HPI:    24-year-old  male with history of multiple skin cancers, history of melanoma when he was golfing and he had pain in the left thigh area, with subsequent limbing     Bone scan on 06/2018 showed abnormal increased uptake within the left femoral neck, inter trochanteric area and proximal day of feces, MRI showed abnormal marrow signal within the left femoral neck, neck, intertrochanteric region with extension to the proximal diaphoresis with mild bone expansion and endosteal scalloping, enlarged prostate measuring 4.8 x 3.6 cm fullness of the right kidney, right kidney biopsy showed high-grade renal cell carcinoma, clear cell subtype with possibility of sarcomatoid variant, positive for vimentin, CD10, comp 5.2, negative for PAX8, melan a, S100 and TTF1     Status post embolization to the tumor of the left hip in August 2018, and left total hip arthroplasty with pathological fracture, metastatic high-grade and sarcomatoid carcinoma consistent with grade 4 renal cell carcinoma, workup showed invading of the right adrenal gland, lung, bone involvement avid on PET scan     Status post 4 cycles of ipilimumab/nivolumab as of 11/01/2018 followed by maintenance nivolumab 480 mg every 4 weeks status post 15 fraction of radiation therapy to the left area     Regarding history of melanoma on his face requiring sentinel lymph node biopsy, history of 2 areas of melanoma in the back and the leg     He had been receiving Xgeva as of 11/07/2019 on monthly basis in [place] under Dr. Kayley Agarwal     PET scan on 05/23/2022 showed necrotic left upper lobe lung mass measuring 2.5 x 2.4 cm with significant decrease from the initial PET scan with mild uptake nodule in the right lower lobe of the lung measuring 1.1 x 0.7 cm unchanged, nodule within the superior segment of the right lower lobe of the lung measuring 0.9 x 0.7 cm, mild uptake in the inferior pole of the right kidney measuring 1.7 x 1.6 cm, mild mesenteric, retroperitoneal, iliac, inguinal lymph node right adrenal nodule measuring 1.0 x 0.7 cm, left adrenal nodule measuring 2.2 x 1.6 cm and stable uptake in the left femoral head into the neck.     He does not smoke or drink     immunotherapy induced diarrhea and colitis in July 2022 requiring discontinuation of nivolumab, CT scan however showed decrease in adrenal metastases        Evaluated by radiation oncology at this time no need for radiation     CAT scan in May 2023 showed stable disease no new lesions    CAT scan in September 2023 showed stable disease no new lesions  Interval History:        Previous Treatment:         Test Results:    Imaging: CT chest abdomen pelvis wo contrast    Result Date: 9/21/2023  Narrative: CT CHEST, ABDOMEN AND PELVIS WITHOUT IV CONTRAST INDICATION:   C64.1: Malignant neoplasm of right kidney, except renal pelvis. COMPARISON: CT chest abdomen pelvis 5/12/2023. TECHNIQUE: CT examination of the chest, abdomen and pelvis was performed without intravenous contrast. Multiplanar 2D reformatted images were created from the source data. This examination, like all CT scans performed in the [facility], was performed utilizing techniques to minimize radiation dose exposure, including the use of iterative reconstruction and automated exposure control. Radiation dose length product (DLP) for this visit:  718 mGy-cm Enteric contrast was not administered. FINDINGS: Exam is limited without IV and oral contrast particularly for soft tissue and bowel evaluation. CHEST LUNGS: Lingular nodule again seen now measuring 2.3 x 2.3 cm image 60 series 6, previously 2.7 x 2.6 cm. Associated linear atelectasis versus scarring. Unchanged 7 mm nodule in the right middle lobe centrally along the fissure image 82 series 6. Unchanged 4 mm left lower lobe nodule laterally image 88 series 6. No new suspicious nodules or masses. There is linear atelectasis or scarring in the right lower lobe. PLEURA:  Unremarkable. HEART/GREAT VESSELS: Heavy atherosclerotic coronary artery calcification is noted. Heart is otherwise unremarkable. No thoracic aortic aneurysm. MEDIASTINUM AND KASEY:  Unremarkable. CHEST WALL AND LOWER NECK:  Unremarkable. ABDOMEN LIVER/BILIARY TREE:  One or more subcentimeter sharply circumscribed low-density hepatic lesion(s) are noted, too small to accurately characterize, but stable.  Hepatic contours are normal.  No biliary dilatation. GALLBLADDER:  No calcified gallstones. No pericholecystic inflammatory change. SPLEEN:  Unremarkable. PANCREAS:  Unremarkable. ADRENAL GLANDS: Bilateral adrenal calcifications more extensive on the left where there is slight nodularity, stable. KIDNEYS/URETERS: Somewhat less well seen linear soft tissue density at the right kidney upper pole extending medially approximately 2.5 x 1.1 cm image 133 series 2, not a significant change previously 2.3 x 1.1 cm. On the prior exam, there is a 1.1 x 1.2 x 1.0 cm low-attenuation focus at the right kidney lower pole. This is not appreciated on the current low-dose CT images. Stable left upper pole renal cyst. No hydronephrosis. A few 1 to 2 mm intrarenal calculi bilaterally. STOMACH AND BOWEL:  There is colonic diverticulosis without evidence of acute diverticulitis. APPENDIX:  No findings to suggest appendicitis. ABDOMINOPELVIC CAVITY:  No ascites. No pneumoperitoneum. No lymphadenopathy. VESSELS:  Atherosclerotic changes are present. No evidence of aneurysm. PELVIS Limited by streak artifact from the left replacement hardware. REPRODUCTIVE ORGANS:  Unremarkable for patient's age. URINARY BLADDER: Underdistended limiting evaluation. ABDOMINAL WALL/INGUINAL REGIONS: There is a small right inguinal hernia containing bowel without obstruction. OSSEOUS STRUCTURES: Stable small 8 mm lucent lesion at the T6 vertebral body posteriorly. Status post left hip replacement. Small sclerotic lesion at the left inferior pubic ramus, stable. Impression: Exam is limited without IV and oral contrast particularly for soft tissue and bowel evaluation. 1. Slightly smaller lingular nodule now measuring up to 2.3 cm in size, previously 2.7 cm. Additional small pulmonary nodules, stable. 2. Stable tiny subcentimeter hypodensities in the liver, too small to characterize. 3. Fairly stable linear soft tissue density at the right kidney upper pole. Previously identified 1.2 cm right kidney lower pole hypodensity cannot be identified on the noncontrast CT images. Consider follow-up with ultrasound if contrast MRI or CT cannot be performed. 4. Stable small 8 mm lucent lesion at the T6 vertebral body posteriorly. Workstation performed: [id number]       Labs:   Lab Results   Component Value Date    WBC 5.80 09/19/2023    HGB 14.7 09/19/2023    HCT 44.0 09/19/2023    MCV 99 (H) 09/19/2023     09/19/2023     Lab Results   Component Value Date    K 4.8 09/19/2023     09/19/2023    CO2 29 09/19/2023    BUN 16 09/19/2023    CREATININE 1.31 (H) 09/19/2023    GLUF 90 09/19/2023    CALCIUM 9.7 09/19/2023    CORRECTEDCA 9.4 08/11/2022    AST 23 09/19/2023    ALT 21 09/19/2023    ALKPHOS 67 09/19/2023    EGFR 51 09/19/2023       No results found for: "IRON", "TIBC", "FERRITIN"    No results found for: "CDNSDXWJ75"      ROS: Review of Systems   Constitutional: Negative. Negative for appetite change, chills, diaphoresis, fatigue, fever and unexpected weight change. HENT:   Negative for hearing loss, lump/mass, mouth sores, nosebleeds, sore throat, trouble swallowing and voice change. Eyes: Negative. Negative for eye problems and icterus. Respiratory: Negative. Negative for chest tightness, cough, hemoptysis and shortness of breath. Cardiovascular: Negative for chest pain and leg swelling. Gastrointestinal: Negative for abdominal distention, abdominal pain, blood in stool, constipation, diarrhea and nausea. Endocrine: Negative. Genitourinary: Negative for dysuria, frequency, hematuria and pelvic pain. Musculoskeletal: Negative. Negative for arthralgias, back pain, flank pain, gait problem, myalgias and neck stiffness. Skin: Negative for itching and rash. Neurological: Negative for dizziness, gait problem, headaches, light-headedness, numbness and speech difficulty. Hematological: Negative for adenopathy.  Does not bruise/bleed easily. Psychiatric/Behavioral: Negative for confusion, decreased concentration, depression and sleep disturbance. The patient is not nervous/anxious. Current Medications: Reviewed  Allergies: Reviewed  PMH/FH/SH:  Reviewed      Physical Exam:    Body surface area is 1.91 meters squared. Wt Readings from Last 3 Encounters:   09/22/23 77.6 kg (171 lb)   07/14/23 76.7 kg (169 lb 1.5 oz)   06/14/23 76.7 kg (169 lb)        Temp Readings from Last 3 Encounters:   09/22/23 (!) 97 °F (36.1 °C) (Temporal)   05/22/23 (!) 97.3 °F (36.3 °C) (Temporal)   03/10/23 97.8 °F (36.6 °C) (Temporal)        BP Readings from Last 3 Encounters:   09/22/23 162/70   07/14/23 162/92   06/14/23 (!) 182/98         Pulse Readings from Last 3 Encounters:   09/22/23 (!) 53   07/14/23 (!) 50   06/14/23 61        Physical Exam  Vitals reviewed. Constitutional:       General: He is not in acute distress. Appearance: He is well-developed. He is not diaphoretic. HENT:      Head: Normocephalic and atraumatic. Eyes:      Conjunctiva/sclera: Conjunctivae normal.   Neck:      Trachea: No tracheal deviation. Cardiovascular:      Rate and Rhythm: Normal rate and regular rhythm. Heart sounds: No murmur heard. No friction rub. No gallop. Pulmonary:      Effort: Pulmonary effort is normal. No respiratory distress. Breath sounds: Normal breath sounds. No wheezing or rales. Chest:      Chest wall: No tenderness. Abdominal:      General: There is no distension. Palpations: Abdomen is soft. Tenderness: There is no abdominal tenderness. Musculoskeletal:      Cervical back: Normal range of motion and neck supple. Right lower leg: No edema. Left lower leg: No edema. Lymphadenopathy:      Cervical: No cervical adenopathy. Skin:     General: Skin is warm and dry. Coloration: Skin is not pale. Findings: Lesion present. No erythema.    Neurological:      Mental Status: He is alert and oriented to person, place, and time. Psychiatric:         Behavior: Behavior normal.         Thought Content: Thought content normal.         Judgment: Judgment normal.         ECO    Goals and Barriers:  Current Goal: Minimize effects of disease. Barriers: None. Patient's Capacity to Self Care:  Patient is able to self care.     Code Status: @[facility]@

## 2023-11-26 ENCOUNTER — OFFICE VISIT (OUTPATIENT)
Dept: URGENT CARE | Age: 78
End: 2023-11-26
Payer: MEDICARE

## 2023-11-26 VITALS
OXYGEN SATURATION: 99 % | SYSTOLIC BLOOD PRESSURE: 182 MMHG | TEMPERATURE: 97.9 F | DIASTOLIC BLOOD PRESSURE: 82 MMHG | RESPIRATION RATE: 18 BRPM | HEART RATE: 67 BPM

## 2023-11-26 DIAGNOSIS — L03.039 PARONYCHIA OF GREAT TOE: Primary | ICD-10-CM

## 2023-11-26 PROCEDURE — G0463 HOSPITAL OUTPT CLINIC VISIT: HCPCS

## 2023-11-26 PROCEDURE — 99203 OFFICE O/P NEW LOW 30 MIN: CPT

## 2023-11-26 RX ORDER — CEPHALEXIN 500 MG/1
500 CAPSULE ORAL EVERY 12 HOURS SCHEDULED
Qty: 14 CAPSULE | Refills: 0 | Status: SHIPPED | OUTPATIENT
Start: 2023-11-26 | End: 2023-12-03

## 2023-11-26 NOTE — PROGRESS NOTES
[facility]        NAME: Ester Cortez is a 78 y.o. male  : 1945    MRN: [id number]  DATE: 2023  TIME: 11:10 AM    Assessment and Plan   Paronychia of great toe [S45.901]  1. Paronychia of great toe  cephalexin (KEFLEX) 500 mg capsule            Patient Instructions       Please take Keflex 2 times daily for the next 7 days. If your symptoms persist, follow-up with your primary care provider for further evaluation. Proceed to  ER if symptoms worsen. Chief Complaint     Chief Complaint   Patient presents with    Toe Pain     Symptoms started 1.5 weeks ago. Swelling and redness present. History of Present Illness       61-year-old male presenting for evaluation of possible toe infection. Patient states approximately 10 days ago his wife attempted to cut his toenails and remove an ingrown toenail. He states that since this he developed pain, swelling and redness to his left great toe. He denies any bleeding or drainage, fevers or chills. He has been applying bacitracin with minimal relief. Toe Pain   Pertinent negatives include no numbness. Review of Systems   Review of Systems   Constitutional:  Negative for chills and fever. Musculoskeletal:  Positive for arthralgias and joint swelling. Skin:  Positive for color change. Neurological:  Negative for weakness and numbness. All other systems reviewed and are negative.         Current Medications       Current Outpatient Medications:     aspirin 81 mg chewable tablet, Chew 81 mg every other day, Disp: , Rfl:     atenolol (TENORMIN) 25 mg tablet, Take 1 tablet (25 mg total) by mouth daily, Disp: 90 tablet, Rfl: 3    calcium carbonate (OS-LITO) 600 MG tablet, Take 600 mg by mouth every other day, Disp: , Rfl:     cephalexin (KEFLEX) 500 mg capsule, Take 1 capsule (500 mg total) by mouth every 12 (twelve) hours for 7 days, Disp: 14 capsule, Rfl: 0    cholecalciferol (VITAMIN D3) 400 units tablet, Take 400 Units by mouth every other day, Disp: , Rfl:     lisinopril (ZESTRIL) 20 mg tablet, Take 1 tablet (20 mg total) by mouth 2 (two) times a day, Disp: 180 tablet, Rfl: 3    pravastatin (PRAVACHOL) 10 mg tablet, Take 1 tablet (10 mg total) by mouth daily, Disp: 90 tablet, Rfl: 3    Current Allergies     Allergies as of 11/26/2023 - Reviewed 11/26/2023   Allergen Reaction Noted    Latex Rash 07/08/2022            The following portions of the patient's history were reviewed and updated as appropriate: allergies, current medications, past family history, past medical history, past social history, past surgical history and problem list.     Past Medical History:   Diagnosis Date    Hypertension     Kidney cancer, primary, 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 [facility])     MI (myocardial infarction) ([address]) 2001       Past Surgical History:   Procedure Laterality Date    HIP SURGERY Left 2018       History reviewed. No pertinent family history. Medications have been verified. Objective   BP (!) 182/82   Pulse 67   Temp 97.9 °F (36.6 °C)   Resp 18   SpO2 99%        Physical Exam     Physical Exam  Vitals and nursing note reviewed. Constitutional:       General: He is not in acute distress. Appearance: Normal appearance. He is normal weight. He is not ill-appearing, toxic-appearing or diaphoretic. HENT:      Head: Normocephalic and atraumatic. Eyes:      General:         Right eye: No discharge. Left eye: No discharge. Cardiovascular:      Rate and Rhythm: Normal rate. Pulses: Normal pulses. Pulmonary:      Effort: Pulmonary effort is normal.   Musculoskeletal:         General: Swelling and tenderness present. Normal range of motion. Feet:    Feet:      Comments: Erythema, swelling, tenderness to palpation, no active bleeding or drainage  Skin:     General: Skin is warm and dry. Capillary Refill: Capillary refill takes less than 2 seconds. Findings: Erythema present. Neurological:      Mental Status: He is alert.    Psychiatric:         Mood and Affect: Mood normal.         Behavior: Behavior normal.

## 2023-11-26 NOTE — PATIENT INSTRUCTIONS
Please take Keflex 2 times daily for the next 7 days. If your symptoms persist, follow-up with your primary care provider for further evaluation.

## 2024-01-17 ENCOUNTER — APPOINTMENT (OUTPATIENT)
Dept: LAB | Age: 79
End: 2024-01-17
Payer: MEDICARE

## 2024-01-17 DIAGNOSIS — C64.1 PRIMARY MALIGNANT NEOPLASM OF RIGHT KIDNEY WITH METASTASIS FROM KIDNEY TO OTHER SITE (HCC): ICD-10-CM

## 2024-01-17 DIAGNOSIS — E78.00 HYPERCHOLESTEROLEMIA: Primary | ICD-10-CM

## 2024-01-17 LAB
ALBUMIN SERPL BCP-MCNC: 4.1 G/DL (ref 3.5–5)
ALP SERPL-CCNC: 55 U/L (ref 34–104)
ALT SERPL W P-5'-P-CCNC: 19 U/L (ref 7–52)
ANION GAP SERPL CALCULATED.3IONS-SCNC: 7 MMOL/L
AST SERPL W P-5'-P-CCNC: 24 U/L (ref 13–39)
BASOPHILS # BLD AUTO: 0.07 THOUSANDS/ÂΜL (ref 0–0.1)
BASOPHILS NFR BLD AUTO: 1 % (ref 0–1)
BILIRUB SERPL-MCNC: 0.52 MG/DL (ref 0.2–1)
BUN SERPL-MCNC: 14 MG/DL (ref 5–25)
CALCIUM SERPL-MCNC: 9.4 MG/DL (ref 8.4–10.2)
CHLORIDE SERPL-SCNC: 106 MMOL/L (ref 96–108)
CO2 SERPL-SCNC: 26 MMOL/L (ref 21–32)
CREAT SERPL-MCNC: 1.13 MG/DL (ref 0.6–1.3)
EOSINOPHIL # BLD AUTO: 0.14 THOUSAND/ÂΜL (ref 0–0.61)
EOSINOPHIL NFR BLD AUTO: 2 % (ref 0–6)
ERYTHROCYTE [DISTWIDTH] IN BLOOD BY AUTOMATED COUNT: 12.7 % (ref 11.6–15.1)
GFR SERPL CREATININE-BSD FRML MDRD: 61 ML/MIN/1.73SQ M
GLUCOSE SERPL-MCNC: 86 MG/DL (ref 65–140)
HCT VFR BLD AUTO: 42.5 % (ref 36.5–49.3)
HGB BLD-MCNC: 14.2 G/DL (ref 12–17)
IMM GRANULOCYTES # BLD AUTO: 0.04 THOUSAND/UL (ref 0–0.2)
IMM GRANULOCYTES NFR BLD AUTO: 1 % (ref 0–2)
LYMPHOCYTES # BLD AUTO: 1.4 THOUSANDS/ÂΜL (ref 0.6–4.47)
LYMPHOCYTES NFR BLD AUTO: 24 % (ref 14–44)
MCH RBC QN AUTO: 33.2 PG (ref 26.8–34.3)
MCHC RBC AUTO-ENTMCNC: 33.4 G/DL (ref 31.4–37.4)
MCV RBC AUTO: 99 FL (ref 82–98)
MONOCYTES # BLD AUTO: 0.56 THOUSAND/ÂΜL (ref 0.17–1.22)
MONOCYTES NFR BLD AUTO: 10 % (ref 4–12)
NEUTROPHILS # BLD AUTO: 3.66 THOUSANDS/ÂΜL (ref 1.85–7.62)
NEUTS SEG NFR BLD AUTO: 62 % (ref 43–75)
NRBC BLD AUTO-RTO: 0 /100 WBCS
PLATELET # BLD AUTO: 292 THOUSANDS/UL (ref 149–390)
PMV BLD AUTO: 9.5 FL (ref 8.9–12.7)
POTASSIUM SERPL-SCNC: 4.4 MMOL/L (ref 3.5–5.3)
PROT SERPL-MCNC: 5.9 G/DL (ref 6.4–8.4)
RBC # BLD AUTO: 4.28 MILLION/UL (ref 3.88–5.62)
SODIUM SERPL-SCNC: 139 MMOL/L (ref 135–147)
WBC # BLD AUTO: 5.87 THOUSAND/UL (ref 4.31–10.16)

## 2024-01-17 PROCEDURE — 85025 COMPLETE CBC W/AUTO DIFF WBC: CPT

## 2024-01-17 PROCEDURE — 80053 COMPREHEN METABOLIC PANEL: CPT

## 2024-01-17 PROCEDURE — 36415 COLL VENOUS BLD VENIPUNCTURE: CPT

## 2024-01-22 ENCOUNTER — HOSPITAL ENCOUNTER (OUTPATIENT)
Dept: CT IMAGING | Facility: HOSPITAL | Age: 79
Discharge: HOME | End: 2024-01-22
Attending: INTERNAL MEDICINE
Payer: MEDICARE

## 2024-01-22 DIAGNOSIS — C64.1 PRIMARY MALIGNANT NEOPLASM OF RIGHT KIDNEY WITH METASTASIS FROM KIDNEY TO OTHER SITE (HCC): ICD-10-CM

## 2024-01-22 PROCEDURE — G1004 CDSM NDSC: HCPCS

## 2024-01-22 PROCEDURE — 74176 CT ABD & PELVIS W/O CONTRAST: CPT

## 2024-01-22 PROCEDURE — 71250 CT THORAX DX C-: CPT

## 2024-02-02 ENCOUNTER — OFFICE VISIT (OUTPATIENT)
Dept: HEMATOLOGY ONCOLOGY | Facility: CLINIC | Age: 79
End: 2024-02-02
Payer: MEDICARE

## 2024-02-02 VITALS
SYSTOLIC BLOOD PRESSURE: 128 MMHG | DIASTOLIC BLOOD PRESSURE: 82 MMHG | HEIGHT: 68 IN | WEIGHT: 174 LBS | RESPIRATION RATE: 17 BRPM | OXYGEN SATURATION: 98 % | TEMPERATURE: 97.7 F | BODY MASS INDEX: 26.37 KG/M2 | HEART RATE: 64 BPM

## 2024-02-02 DIAGNOSIS — C64.1 PRIMARY MALIGNANT NEOPLASM OF RIGHT KIDNEY WITH METASTASIS FROM KIDNEY TO OTHER SITE (HCC): Primary | ICD-10-CM

## 2024-02-02 PROCEDURE — 99214 OFFICE O/P EST MOD 30 MIN: CPT | Performed by: INTERNAL MEDICINE

## 2024-02-02 NOTE — PROGRESS NOTES
Hematology Outpatient Follow - Up Note  Fabricio Castillo 78 y.o. 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 with multiple retroperitoneal, pelvic lymphadenopathy, bilateral adrenal gland involvement, bilateral lung involvement and left femur head involvement status post ORIF followed by radiation therapy he received 4 cycles of ipilimumab/nivolumab as of 11/01/2018 at [place] [facility]     He had good response with subsequent continuation of nivolumab 480 mg every month     The last PET scan on 05/23/2022 showed favorable response involving the lungs, retroperitoneal, bilateral adrenal glands primary in the right kidney     CAT scan in February 2023 showed stable left upper lobe lung nodule, bilateral small stable lung nodules     CAT scan in May 2023 showed stable disease no new lesions     CAT scan in September 2023 showed stable disease no new lesions as well    Repeat CAT scan in January 2024 showed stable left upper lobe lung nodule, no new lesion to suggest progression of disease at this time we will follow-up in 5-month    Squamous cell carcinoma of the scalp resolved by 5-FU cream        Labs and imaging studies are reviewed by ordering provider once results are available. If there are findings that need immediate attention, you will be contacted when results available.   Discussing results and the implication on your healthcare is best discussed in person at your follow-up visit.       HPI:    78-year-old  male with history of multiple skin cancers, history of melanoma when he was golfing and he had pain in the left thigh area, with subsequent limbing     Bone scan on 06/2018 showed abnormal increased uptake within the left femoral neck, inter trochanteric area and proximal day of feces, MRI showed abnormal marrow signal within the left femoral neck, neck,  intertrochanteric region with extension to the proximal diaphoresis with mild bone expansion and endosteal scalloping, enlarged prostate measuring 4.8 x 3.6 cm fullness of the right kidney, right kidney biopsy showed high-grade renal cell carcinoma, clear cell subtype with possibility of sarcomatoid variant, positive for vimentin, CD10, comp 5.2, negative for PAX8, melan a, S100 and TTF1     Status post embolization to the tumor of the left hip in August 2018, and left total hip arthroplasty with pathological fracture, metastatic high-grade and sarcomatoid carcinoma consistent with grade 4 renal cell carcinoma, workup showed invading of the right adrenal gland, lung, bone involvement avid on PET scan     Status post 4 cycles of ipilimumab/nivolumab as of 11/01/2018 followed by maintenance nivolumab 480 mg every 4 weeks status post 15 fraction of radiation therapy to the left area     Regarding history of melanoma on his face requiring sentinel lymph node biopsy, history of 2 areas of melanoma in the back and the leg     He had been receiving Xgeva as of 11/07/2019 on monthly basis in [place] under Dr. Adali Braden     PET scan on 05/23/2022 showed necrotic left upper lobe lung mass measuring 2.5 x 2.4 cm with significant decrease from the initial PET scan with mild uptake nodule in the right lower lobe of the lung measuring 1.1 x 0.7 cm unchanged, nodule within the superior segment of the right lower lobe of the lung measuring 0.9 x 0.7 cm, mild uptake in the inferior pole of the right kidney measuring 1.7 x 1.6 cm, mild mesenteric, retroperitoneal, iliac, inguinal lymph node right adrenal nodule measuring 1.0 x 0.7 cm, left adrenal nodule measuring 2.2 x 1.6 cm and stable uptake in the left femoral head into the neck.     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 COMPARISON: Compared with 9/15/2023 TECHNIQUE: CT examination of the chest, abdomen and pelvis was performed without intravenous contrast. Multiplanar 2D reformatted images were created from the source data. This examination, like all CT scans performed in the [facility], was performed utilizing techniques to minimize radiation dose exposure, including the use of iterative reconstruction and automated exposure control. Radiation dose length product (DLP) for this visit: 712.08 mGy-cm Enteric Contrast: Not administered. FINDINGS: CHEST LUNGS: Linear scarring in the posterior right lower lobe unchanged. Middle lobe 8 x 6.7 mm nodule unchanged and series 4 image 162. Masslike rounded atelectasis in the lingula with surrounding scarring in series 4 image 117 measuring 2.3 x 2.3 cm compared to 2.3 x 2.5 cm. Left lower lobe 2.8 mm nodule in series 4 image 155 unchanged. . No tracheal or endobronchial lesion. PLEURA: Unremarkable. HEART/GREAT VESSELS: Heart is unremarkable for patient's age. No thoracic aortic aneurysm. Coronary vascular calcifications MEDIASTINUM AND KASEY: Small hiatal hernia.. CHEST WALL AND LOWER NECK: Unremarkable. ABDOMEN LIVER/BILIARY TREE: Subcentimeter hypoattenuating lesion(s) too small to characterize, but statistically likely benign findings which do not require follow-up (ACR White Paper 2017). No suspicious mass. Hepatic contours are normal. No biliary dilation. GALLBLADDER: No calcified gallstones. No pericholecystic  "inflammatory change. SPLEEN: Unremarkable. PANCREAS: Unremarkable. ADRENAL GLANDS: Punctate calcification in the right adrenal gland. Left adrenal soft tissue with calcification measuring 1.6 cm unchanged.. KIDNEYS/URETERS: Right kidney upper pole scar nodule series 601 image 105 measuring 8 mm compared to 1.1 cm previously.. Left kidney upper pole exophytic simple cyst measuring 3.6 cm. Mid pole nonobstructive calculus unchanged on the left.. No hydronephrosis. STOMACH AND BOWEL: Sigmoid diverticulosis. Stool in the colon. APPENDIX: No findings to suggest appendicitis. ABDOMINOPELVIC CAVITY: No ascites. No pneumoperitoneum. No lymphadenopathy. VESSELS: Unremarkable for patient's age. PELVIS REPRODUCTIVE ORGANS: Unremarkable for patient's age. URINARY BLADDER: Poorly distended.. ABDOMINAL WALL/INGUINAL REGIONS: Unremarkable. BONES: Streak artifact from left hip prosthesis. Mild to moderate right hip degenerative change. Lumbar degenerative changes. Unchanged lucency in T7 vertebral body posteriorly. No acute fracture or suspicious osseous lesion.     Impression: Improving scarlike nodule in the upper pole of the right kidney. No findings to suggest recurrence. No change from prior study. Workstation performed: [id number]       Labs:   Lab Results   Component Value Date    WBC 5.87 01/17/2024    HGB 14.2 01/17/2024    HCT 42.5 01/17/2024    MCV 99 (H) 01/17/2024     01/17/2024     Lab Results   Component Value Date    K 4.4 01/17/2024     01/17/2024    CO2 26 01/17/2024    BUN 14 01/17/2024    CREATININE 1.13 01/17/2024    GLUF 90 09/19/2023    CALCIUM 9.4 01/17/2024    CORRECTEDCA 9.4 08/11/2022    AST 24 01/17/2024    ALT 19 01/17/2024    ALKPHOS 55 01/17/2024    EGFR 61 01/17/2024       No results found for: \"IRON\", \"TIBC\", \"FERRITIN\"    No results found for: \"EQAKKKZG15\"      ROS: Review of Systems   Constitutional: Negative.  Negative for appetite change, chills, diaphoresis, fatigue, fever and " unexpected weight change.   HENT:   Negative for hearing loss, lump/mass, mouth sores, nosebleeds, sore throat, trouble swallowing and voice change.    Eyes: Negative.  Negative for eye problems and icterus.   Respiratory: Negative.  Negative for chest tightness, cough, hemoptysis and shortness of breath.    Cardiovascular:  Negative for chest pain and leg swelling.   Gastrointestinal:  Negative for abdominal distention, abdominal pain, blood in stool, constipation, diarrhea and nausea.   Endocrine: Negative.    Genitourinary:  Negative for dysuria, frequency, hematuria and pelvic pain.    Musculoskeletal:  Positive for arthralgias. Negative for back pain, flank pain, gait problem, myalgias and neck stiffness.   Skin:  Negative for itching and rash.   Neurological:  Negative for dizziness, gait problem, headaches, light-headedness, numbness and speech difficulty.   Hematological:  Negative for adenopathy. Does not bruise/bleed easily.   Psychiatric/Behavioral:  Negative for confusion, decreased concentration, depression and sleep disturbance. The patient is not nervous/anxious.           Current Medications: Reviewed  Allergies: Reviewed  PMH/FH/SH:  Reviewed      Physical Exam:    Body surface area is 1.93 meters squared.    Wt Readings from Last 3 Encounters:   02/02/24 78.9 kg (174 lb)   09/22/23 77.6 kg (171 lb)   07/14/23 76.7 kg (169 lb 1.5 oz)        Temp Readings from Last 3 Encounters:   02/02/24 97.7 °F (36.5 °C) (Temporal)   11/26/23 97.9 °F (36.6 °C)   09/22/23 (!) 97 °F (36.1 °C) (Temporal)        BP Readings from Last 3 Encounters:   02/02/24 128/82   11/26/23 (!) 182/82   09/22/23 162/70         Pulse Readings from Last 3 Encounters:   02/02/24 64   11/26/23 67   09/22/23 (!) 53        Physical Exam  Vitals reviewed.   Constitutional:       General: He is not in acute distress.     Appearance: He is well-developed. He is not diaphoretic.   HENT:      Head: Normocephalic and atraumatic.   Eyes:       Conjunctiva/sclera: Conjunctivae normal.   Neck:      Trachea: No tracheal deviation.   Cardiovascular:      Rate and Rhythm: Normal rate and regular rhythm.      Heart sounds: No murmur heard.     No friction rub. No gallop.   Pulmonary:      Effort: Pulmonary effort is normal. No respiratory distress.      Breath sounds: Normal breath sounds. No wheezing or rales.   Chest:      Chest wall: No tenderness.   Abdominal:      General: There is no distension.      Palpations: Abdomen is soft.      Tenderness: There is no abdominal tenderness.   Musculoskeletal:      Cervical back: Normal range of motion and neck supple.      Right lower leg: No edema.      Left lower leg: No edema.   Lymphadenopathy:      Cervical: No cervical adenopathy.   Skin:     General: Skin is warm and dry.      Coloration: Skin is not pale.      Findings: No erythema.   Neurological:      Mental Status: He is alert and oriented to person, place, and time.   Psychiatric:         Behavior: Behavior normal.         Thought Content: Thought content normal.         Judgment: Judgment normal.         ECO    Goals and Barriers:  Current Goal: Minimize effects of disease.   Barriers: None.      Patient's Capacity to Self Care:  Patient is able to self care.    Code Status: [unfilled]

## 2024-04-28 DIAGNOSIS — E78.00 HYPERCHOLESTEROLEMIA: ICD-10-CM

## 2024-04-28 DIAGNOSIS — I10 PRIMARY HYPERTENSION: ICD-10-CM

## 2024-04-29 RX ORDER — ATENOLOL 25 MG/1
25 TABLET ORAL DAILY
Qty: 90 TABLET | Refills: 1 | Status: SHIPPED | OUTPATIENT
Start: 2024-04-29

## 2024-04-29 RX ORDER — PRAVASTATIN SODIUM 10 MG
10 TABLET ORAL DAILY
Qty: 90 TABLET | Refills: 1 | Status: SHIPPED | OUTPATIENT
Start: 2024-04-29

## 2024-05-18 ENCOUNTER — HOSPITAL ENCOUNTER (EMERGENCY)
Facility: HOSPITAL | Age: 79
Discharge: HOME | End: 2024-05-19
Attending: EMERGENCY MEDICINE
Payer: MEDICARE

## 2024-05-18 DIAGNOSIS — M43.6 TORTICOLLIS: Primary | ICD-10-CM

## 2024-05-18 PROCEDURE — 99283 EMERGENCY DEPT VISIT LOW MDM: CPT

## 2024-05-19 VITALS
SYSTOLIC BLOOD PRESSURE: 167 MMHG | OXYGEN SATURATION: 97 % | RESPIRATION RATE: 18 BRPM | TEMPERATURE: 98 F | DIASTOLIC BLOOD PRESSURE: 87 MMHG | HEART RATE: 65 BPM

## 2024-05-19 PROCEDURE — 99284 EMERGENCY DEPT VISIT MOD MDM: CPT | Performed by: EMERGENCY MEDICINE

## 2024-05-19 RX ORDER — METHOCARBAMOL 500 MG/1
500 TABLET, FILM COATED ORAL ONCE
Status: COMPLETED | OUTPATIENT
Start: 2024-05-19 | End: 2024-05-19

## 2024-05-19 RX ORDER — IBUPROFEN 400 MG/1
400 TABLET ORAL ONCE
Status: COMPLETED | OUTPATIENT
Start: 2024-05-19 | End: 2024-05-19

## 2024-05-19 RX ORDER — METHOCARBAMOL 500 MG/1
500 TABLET, FILM COATED ORAL
Qty: 10 TABLET | Refills: 0 | Status: SHIPPED | OUTPATIENT
Start: 2024-05-19

## 2024-05-19 RX ORDER — LIDOCAINE 50 MG/G
1 PATCH TOPICAL ONCE
Status: DISCONTINUED | OUTPATIENT
Start: 2024-05-19 | End: 2024-05-19 | Stop reason: HOSPADM

## 2024-05-19 RX ADMIN — LIDOCAINE 5% 1 PATCH: 700 PATCH TOPICAL at 01:27

## 2024-05-19 RX ADMIN — METHOCARBAMOL 500 MG: 500 TABLET ORAL at 01:27

## 2024-05-19 RX ADMIN — IBUPROFEN 400 MG: 400 TABLET, FILM COATED ORAL at 01:27

## 2024-05-19 NOTE — ED ATTENDING ATTESTATION
5/18/2024  IHumberto DO, saw and evaluated the patient. I have discussed the patient with the resident/non-physician practitioner and agree with the resident's/non-physician practitioner's findings, Plan of Care, and MDM as documented in the resident's/non-physician practitioner's note, except where noted. All available labs and Radiology studies were reviewed.  I was present for key portions of any procedure(s) performed by the resident/non-physician practitioner and I was immediately available to provide assistance.       At this point I agree with the current assessment done in the Emergency Department.  I have conducted an independent evaluation of this patient a history and physical is as follows:    Patient is an 78-year-old male with a history of renal cell carcinoma who presents with left-sided neck pain.  Patient states that the pain started about 1 week ago but got significantly worse 2 to 3 days ago.  He states that he has a baseline ache that has been constant over the past 3 days.  He has periods of acute worsening of pain with certain activities.  This evening, he states that it got worse as he was trying to change his thermostat.  He states that the achiness gets much more intense.  He describes the location as his left lateral neck.  He denies headache, vision changes, speech difficulty.  He denies any numbness, tingling or weakness in his upper extremities.  He does admit to baseline tinnitus which gets slightly worse when pain worsens.  He has taken ibuprofen which has provided some relief.    On exam, patient is in no acute distress.  He has difficulty with neck rotation secondary to pain.  He has tenderness to palpation and palpable spasm in the left paraspinal musculature.  No midline tenderness in the cervical, thoracic or lumbar spine.  Motor, sensation normal in all 4 extremities.  Speech fluent.  Visual fields intact.    Do not suspect cervical artery dissection, meningitis,  "referred pain from a cardiopulmonary etiology.  Suspect musculoskeletal neck pain.  Patient received symptomatic treatment in the ED and reports improvement.  He is stable for discharge and ongoing treatment as an outpatient.  He will follow-up with his PCP and return to ED if symptoms worsen or she develops numbness, tingling, weakness, chest pain, shortness of breath or other concerns.    Portions of the above record have been created with voice recognition software.  Occasional wrong word or \"sound alike\" substitutions may have occurred due to the inherent limitations of voice recognition software.  Read the chart carefully and recognize, using context, where substitutions may have occurred.      ED Course         Critical Care Time  Procedures      "

## 2024-05-19 NOTE — ED PROVIDER NOTES
"History  Chief Complaint   Patient presents with    Tinnitus     Pt reports he has been having a cramp in the back of his neck for a \"few days\". States tonight when he lies down, \"I have this unbearable noise in my ear that is accelerated by my neck cramping.\" States has been unable to sleep d/t the ringing in his ears.    Neck Pain     The patient is an 78 year old male who presents to ED with his wife for evaluation of neck pain. Pt states he had been having pain in the left side of his neck for a few days. He states pain makes it difficult to turn his head and to move his left arm. He states he has taken ibuprofen one tablet once a day with minimal relief. He notes an increase to his baseline tinnitus in the left ear during the same time. Denies fever, chills, recent trauma or injury, chest pain, SOB, back pain, weakness, numbness        Prior to Admission Medications   Prescriptions Last Dose Informant Patient Reported? Taking?   aspirin 81 mg chewable tablet  Self Yes No   Sig: Chew 81 mg every other day   atenolol (TENORMIN) 25 mg tablet   No No   Sig: TAKE 1 TABLET BY MOUTH DAILY   calcium carbonate (OS-LITO) 600 MG tablet  Self Yes No   Sig: Take 600 mg by mouth every other day   cholecalciferol (VITAMIN D3) 400 units tablet  Self Yes No   Sig: Take 400 Units by mouth every other day   lisinopril (ZESTRIL) 20 mg tablet  Self No No   Sig: Take 1 tablet (20 mg total) by mouth 2 (two) times a day   pravastatin (PRAVACHOL) 10 mg tablet   No No   Sig: TAKE 1 TABLET BY MOUTH DAILY      Facility-Administered Medications: None       Past Medical History:   Diagnosis Date    Hypertension     Kidney cancer, primary, 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 (HCC)     MI (myocardial infarction) (HCC) 2001       Past Surgical History:   Procedure Laterality Date    HIP SURGERY Left 2018       History reviewed. No pertinent family history.  I have reviewed and agree with the history as documented.    E-Cigarette/Vaping    " E-Cigarette Use Never User      E-Cigarette/Vaping Substances     Social History     Tobacco Use    Smoking status: Former     Current packs/day: 0.50     Average packs/day: 0.5 packs/day for 15.0 years (7.5 ttl pk-yrs)     Types: Cigarettes    Smokeless tobacco: Never    Tobacco comments:     Quit 40 years prior   Vaping Use    Vaping status: Never Used   Substance Use Topics    Alcohol use: Never    Drug use: Never        Review of Systems   Constitutional:  Positive for activity change. Negative for fever.   HENT:  Negative for congestion and sore throat.    Eyes:  Negative for visual disturbance.   Respiratory:  Negative for shortness of breath.    Cardiovascular:  Negative for chest pain.   Gastrointestinal:  Negative for vomiting.   Genitourinary:  Negative for difficulty urinating and hematuria.   Musculoskeletal:  Positive for myalgias and neck pain. Negative for back pain and gait problem.   Skin:  Negative for rash and wound.   Neurological:  Negative for dizziness, syncope, facial asymmetry and weakness.       Physical Exam  ED Triage Vitals [05/18/24 2333]   Temperature Pulse Respirations Blood Pressure SpO2   98 °F (36.7 °C) 65 18 (!) 194/92 97 %      Temp Source Heart Rate Source Patient Position - Orthostatic VS BP Location FiO2 (%)   Oral Monitor Lying Right arm --      Pain Score       --             Orthostatic Vital Signs  Vitals:    05/18/24 2333 05/19/24 0134 05/19/24 0136   BP: (!) 194/92 170/95 167/87   Pulse: 65     Patient Position - Orthostatic VS: Lying         Physical Exam  Vitals and nursing note reviewed.   Constitutional:       General: He is not in acute distress.     Appearance: Normal appearance. He is normal weight.   HENT:      Head: Normocephalic and atraumatic.      Right Ear: Tympanic membrane, ear canal and external ear normal. There is no impacted cerumen.      Left Ear: Tympanic membrane, ear canal and external ear normal. There is no impacted cerumen.   Neck:       Comments: Palpable muscle spasm to the left cervical paraspinal muscles.   Pain induced with turning the head to the left  Cardiovascular:      Rate and Rhythm: Normal rate and regular rhythm.      Pulses: Normal pulses.      Heart sounds: Normal heart sounds.   Pulmonary:      Effort: Pulmonary effort is normal. No respiratory distress.      Breath sounds: Normal breath sounds.   Abdominal:      General: Abdomen is flat. Bowel sounds are normal.      Palpations: Abdomen is soft.      Tenderness: There is no abdominal tenderness.   Musculoskeletal:         General: Normal range of motion.      Cervical back: Normal range of motion and neck supple. Tenderness present. No rigidity.      Comments: Normal ROM of the left shoulder and arm.  Palpable muscle spasm to the cervical left paraspinal muscles.   Pain induced with turning the head to the left   Skin:     General: Skin is warm and dry.      Findings: No bruising, erythema, lesion or rash.   Neurological:      General: No focal deficit present.      Mental Status: He is alert and oriented to person, place, and time.      Cranial Nerves: No cranial nerve deficit.      Sensory: No sensory deficit.   Psychiatric:         Mood and Affect: Mood normal.         Behavior: Behavior normal.         Thought Content: Thought content normal.         Judgment: Judgment normal.         ED Medications  Medications   ibuprofen (MOTRIN) tablet 400 mg (400 mg Oral Given 5/19/24 0127)   methocarbamol (ROBAXIN) tablet 500 mg (500 mg Oral Given 5/19/24 0127)       Diagnostic Studies  Results Reviewed       None                   No orders to display         Procedures  Procedures      ED Course                                       Medical Decision Making  DDX: torticolis, disc herniation, fracture    No midline bony tenderness on exam.   Palpable paraspinal muscle spasm on exam  Good ROM on neck. No rigidity of neck.   NO systemic sx  Pt given robaxin, lidocaine and  ibuprofen.    Amount and/or Complexity of Data Reviewed  Independent Historian: spouse    Risk  Prescription drug management.          Disposition  Final diagnoses:   Torticollis     Time reflects when diagnosis was documented in both MDM as applicable and the Disposition within this note       Time User Action Codes Description Comment    5/19/2024  2:03 AM Loren Sullivan Add [M43.6] Torticollis           ED Disposition       ED Disposition   Discharge    Condition   Stable    Date/Time   Sun May 19, 2024  1:50 AM    Comment   Fabricio Castillo discharge to home/self care.                   Follow-up Information       Follow up With Specialties Details Why Contact Info Additional Information    [facility]   [address]  [address]  [facility] [address]  [phone] [facility] [facility], [address], [place] [address]   [phone]            Discharge Medication List as of 5/19/2024  2:04 AM        START taking these medications    Details   methocarbamol (ROBAXIN) 500 mg tablet Take 1 tablet (500 mg total) by mouth daily at bedtime, Starting Sun 5/19/2024, Normal           CONTINUE these medications which have NOT CHANGED    Details   aspirin 81 mg chewable tablet Chew 81 mg every other day, Historical Med      atenolol (TENORMIN) 25 mg tablet TAKE 1 TABLET BY MOUTH DAILY, Starting Mon 4/29/2024, Normal      calcium carbonate (OS-LITO) 600 MG tablet Take 600 mg by mouth every other day, Historical Med      cholecalciferol (VITAMIN D3) 400 units tablet Take 400 Units by mouth every other day, Historical Med      lisinopril (ZESTRIL) 20 mg tablet Take 1 tablet (20 mg total) by mouth 2 (two) times a day, Starting Fri 7/7/2023, Normal      pravastatin (PRAVACHOL) 10 mg tablet TAKE 1 TABLET BY MOUTH DAILY, Starting Mon 4/29/2024, Normal           No discharge procedures on file.    PDMP Review       None             ED  Provider  Attending physically available and evaluated Fabricio Castillo. I managed the patient along with the ED Attending.    Electronically Signed by           Loren Sullivan MD  05/23/24 1069

## 2024-05-19 NOTE — DISCHARGE INSTRUCTIONS
You may use Robaxin (methocarbamol) at night before bed for muscular neck pain. Do not drive or operate machinery as this can make you tired.    You may use over the counter lidocaine patch to the painful area. Apply the patch and leave on for up to 12 hours then remove and discard patch and leave off of r 12 hours before applying a new patch.     You may use a heading pad or warm compress or ice pack when not wearing lidocaine patch. Warm shower or soaks in the bathtub can also be use. Gentle massage to the area can also be helpful.    You may use ibuprofen 400mg (2 tablets) in the morning and then take one tablet (200mg) every 6 hours for pain for the next 2-3 days. Stay well hydrated by drinking plenty of fluids to help protect your kidneys while taking ibuprofen.

## 2024-06-19 ENCOUNTER — TELEPHONE (OUTPATIENT)
Dept: HEMATOLOGY ONCOLOGY | Facility: CLINIC | Age: 79
End: 2024-06-19

## 2024-06-19 NOTE — TELEPHONE ENCOUNTER
Patient Call    Who are you speaking with? Spouse    If it is not the patient, are they listed on an active communication consent form? Yes   What is the reason for this call? Patients wife is asking for a call back to go about renewing his handicap  for the car   Does this require a call back? Yes   If a call back is required, please list [facility] call back number [phone]   If a call back is required, advise that a message will be forwarded to their care team and someone will return their call as soon as possible.   Did you relay this information to the patient? Yes

## 2024-06-21 ENCOUNTER — TELEPHONE (OUTPATIENT)
Dept: HEMATOLOGY ONCOLOGY | Facility: CLINIC | Age: 79
End: 2024-06-21

## 2024-07-02 ENCOUNTER — HOSPITAL ENCOUNTER (OUTPATIENT)
Dept: CT IMAGING | Facility: HOSPITAL | Age: 79
Discharge: HOME | End: 2024-07-02
Attending: INTERNAL MEDICINE
Payer: MEDICARE

## 2024-07-02 ENCOUNTER — APPOINTMENT (OUTPATIENT)
Dept: RADIOLOGY | Facility: HOSPITAL | Age: 79
End: 2024-07-02
Payer: MEDICARE

## 2024-07-02 DIAGNOSIS — C64.1 PRIMARY MALIGNANT NEOPLASM OF RIGHT KIDNEY WITH METASTASIS FROM KIDNEY TO OTHER SITE (HCC): ICD-10-CM

## 2024-07-02 PROCEDURE — 71250 CT THORAX DX C-: CPT

## 2024-07-02 PROCEDURE — 74176 CT ABD & PELVIS W/O CONTRAST: CPT

## 2024-08-23 ENCOUNTER — OFFICE VISIT (OUTPATIENT)
Dept: CARDIOLOGY CLINIC | Facility: CLINIC | Age: 79
End: 2024-08-23
Payer: MEDICARE

## 2024-08-23 VITALS
WEIGHT: 171.4 LBS | OXYGEN SATURATION: 97 % | DIASTOLIC BLOOD PRESSURE: 90 MMHG | SYSTOLIC BLOOD PRESSURE: 192 MMHG | HEIGHT: 68 IN | BODY MASS INDEX: 25.98 KG/M2

## 2024-08-23 DIAGNOSIS — I21.9 MI (MYOCARDIAL INFARCTION) (HCC): ICD-10-CM

## 2024-08-23 DIAGNOSIS — I49.3 PVC'S (PREMATURE VENTRICULAR CONTRACTIONS): ICD-10-CM

## 2024-08-23 DIAGNOSIS — I10 PRIMARY HYPERTENSION: Primary | ICD-10-CM

## 2024-08-23 DIAGNOSIS — I11.9 HYPERTENSIVE HEART DISEASE WITHOUT HEART FAILURE: ICD-10-CM

## 2024-08-23 DIAGNOSIS — E78.00 HYPERCHOLESTEROLEMIA: ICD-10-CM

## 2024-08-23 DIAGNOSIS — I25.10 CORONARY ARTERY DISEASE INVOLVING NATIVE CORONARY ARTERY OF NATIVE HEART WITHOUT ANGINA PECTORIS: ICD-10-CM

## 2024-08-23 PROCEDURE — 99214 OFFICE O/P EST MOD 30 MIN: CPT | Performed by: INTERNAL MEDICINE

## 2024-08-23 PROCEDURE — 93000 ELECTROCARDIOGRAM COMPLETE: CPT | Performed by: INTERNAL MEDICINE

## 2024-08-23 NOTE — PROGRESS NOTES
Cardiology Follow Up    Fabricio Castillo  1945  [id number]  [facility] CARDIAC CATH LAB  [address] [address]  [phone]    1. Primary hypertension  Stress test only, exercise      2. MI (myocardial infarction) (HCC)  POCT ECG    Stress test only, exercise    Lipid Panel with Direct LDL reflex      3. Coronary artery disease involving native coronary artery of native heart without angina pectoris        4. PVC's (premature ventricular contractions)        5. Hypercholesterolemia  Lipid Panel with Direct LDL reflex      6. Hypertensive heart disease without heart failure  Stress test only, exercise          Interval History: Cardiology follow-up.  Patient has not been seen by myself previously.  Multiple records reviewed, imaging was reviewed as well.  79-year-old male who has history of coronary disease.  Myocardial infarction in 2001.  No details available, no catheterization done at that time.  Symptoms were dyspnea.  He has been clinically stable over the years he had an echocardiogram last year that revealed normal left ventricular systolic  function.  Stage I diastolic dysfunction.  No significant valvular abnormalities.  EKG today is normal.  Patient is somewhat active.  He has issues with his hip and sometimes uses a cane but he is not doing any regular exercise.  No exertional symptoms.  Patient is non-smoker no recent ED profile, he is on low intense statin therapy, he is on a low-dose antiplatelet regimen because of bruisability 3 times a week aspirin.  He is somewhat compliant with low-sodium diet, although not strictly.  She is.  Her blood pressures in the 1 70-1 80 range per patient, today's is 190 systolic over 90 diastolic.  Denies any syncope or presyncope.    Patient Active Problem List   Diagnosis    Bone metastases    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 (HCC)     Hypertension    MI (myocardial infarction) (HCC)    Coronary artery disease involving native coronary artery of native heart without angina pectoris    Hypercholesterolemia    PVC's (premature ventricular contractions)    Stage 3a chronic kidney disease (HCC)     Past Medical History:   Diagnosis Date    Hypertension     Kidney cancer, primary, 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 (HCC)     MI (myocardial infarction) (HCC) 2001     Social History     Socioeconomic History    Marital status: /Civil Union     Spouse name: Not on file    Number of children: Not on file    Years of education: Not on file    Highest education level: Not on file   Occupational History    Not on file   Tobacco Use    Smoking status: Former     Current packs/day: 0.50     Average packs/day: 0.5 packs/day for 15.0 years (7.5 ttl pk-yrs)     Types: Cigarettes    Smokeless tobacco: Never    Tobacco comments:     Quit 40 years prior   Vaping Use    Vaping status: Never Used   Substance and Sexual Activity    Alcohol use: Never    Drug use: Never    Sexual activity: Not Currently   Other Topics Concern    Not on file   Social History Narrative    Not on file     Social Determinants of Health     Financial Resource Strain: Not on file   Food Insecurity: Not on file   Transportation Needs: Not on file   Physical Activity: Not on file   Stress: Not on file   Social Connections: Not on file   Intimate Partner Violence: Not on file   Housing Stability: Not on file      History reviewed. No pertinent family history.  Past Surgical History:   Procedure Laterality Date    HIP SURGERY Left 2018       Current Outpatient Medications:     aspirin 81 mg chewable tablet, Chew 81 mg every other day, Disp: , Rfl:     atenolol (TENORMIN) 25 mg tablet, TAKE 1 TABLET BY MOUTH DAILY, Disp: 90 tablet, Rfl: 1    calcium carbonate (OS-LITO) 600 MG tablet, Take 600 mg by mouth every other day, Disp: , Rfl:     cholecalciferol (VITAMIN D3) 400 units  tablet, Take 400 Units by mouth every other day, Disp: , Rfl:     lisinopril (ZESTRIL) 20 mg tablet, Take 1 tablet (20 mg total) by mouth 2 (two) times a day, Disp: 180 tablet, Rfl: 3    pravastatin (PRAVACHOL) 10 mg tablet, TAKE 1 TABLET BY MOUTH DAILY, Disp: 90 tablet, Rfl: 1    methocarbamol (ROBAXIN) 500 mg tablet, Take 1 tablet (500 mg total) by mouth daily at bedtime (Patient not taking: Reported on 8/23/2024), Disp: 10 tablet, Rfl: 0  Allergies   Allergen Reactions    Latex Rash       Labs:  No visits with results within 6 Month(s) from this visit.   Latest known visit with results is:   Appointment on 01/17/2024   Component Date Value    WBC 01/17/2024 5.87     RBC 01/17/2024 4.28     Hemoglobin 01/17/2024 14.2     Hematocrit 01/17/2024 42.5     MCV 01/17/2024 99 (H)     MCH 01/17/2024 33.2     MCHC 01/17/2024 33.4     RDW 01/17/2024 12.7     MPV 01/17/2024 9.5     Platelets 01/17/2024 292     nRBC 01/17/2024 0     Segmented % 01/17/2024 62     Immature Grans % 01/17/2024 1     Lymphocytes % 01/17/2024 24     Monocytes % 01/17/2024 10     Eosinophils Relative 01/17/2024 2     Basophils Relative 01/17/2024 1     Absolute Neutrophils 01/17/2024 3.66     Absolute Immature Grans 01/17/2024 0.04     Absolute Lymphocytes 01/17/2024 1.40     Absolute Monocytes 01/17/2024 0.56     Eosinophils Absolute 01/17/2024 0.14     Basophils Absolute 01/17/2024 0.07     Sodium 01/17/2024 139     Potassium 01/17/2024 4.4     Chloride 01/17/2024 106     CO2 01/17/2024 26     ANION GAP 01/17/2024 7     BUN 01/17/2024 14     Creatinine 01/17/2024 1.13     Glucose 01/17/2024 86     Calcium 01/17/2024 9.4     AST 01/17/2024 24     ALT 01/17/2024 19     Alkaline Phosphatase 01/17/2024 55     Total Protein 01/17/2024 5.9 (L)     Albumin 01/17/2024 4.1     Total Bilirubin 01/17/2024 0.52     eGFR 01/17/2024 61      Imaging: No results found.    Review of Systems:  Review of Systems   Constitutional:  Negative for activity change,  appetite change, chills, diaphoresis, fatigue and fever.   HENT:  Negative for hearing loss, nosebleeds and trouble swallowing.    Eyes:  Negative for visual disturbance.   Respiratory:  Negative for apnea, cough, choking, chest tightness, shortness of breath, wheezing and stridor.    Cardiovascular:  Negative for chest pain, palpitations and leg swelling.   Gastrointestinal:  Negative for abdominal pain, anal bleeding, blood in stool, constipation, diarrhea, nausea and vomiting.   Endocrine: Negative for cold intolerance and heat intolerance.   Genitourinary:  Negative for hematuria.   Musculoskeletal:  Positive for arthralgias and gait problem. Negative for myalgias.   Skin:  Positive for color change and rash.   Allergic/Immunologic: Negative for immunocompromised state.   Neurological:  Negative for dizziness, syncope and speech difficulty.   Hematological:  Bruises/bleeds easily.   Psychiatric/Behavioral:  Negative for sleep disturbance. The patient is not nervous/anxious.        Physical Exam:  Physical Exam  Vitals reviewed.   Constitutional:       General: He is not in acute distress.     Appearance: Normal appearance. He is normal weight. He is not ill-appearing, toxic-appearing or diaphoretic.   HENT:      Head: Normocephalic.   Eyes:      General: No scleral icterus.     Conjunctiva/sclera: Conjunctivae normal.   Neck:      Vascular: No carotid bruit.   Cardiovascular:      Rate and Rhythm: Normal rate and regular rhythm.      Pulses: Normal pulses.      Heart sounds: Normal heart sounds. No murmur heard.     No friction rub. No gallop.   Pulmonary:      Effort: Pulmonary effort is normal. No respiratory distress.      Breath sounds: Normal breath sounds. No stridor. No wheezing, rhonchi or rales.   Chest:      Chest wall: No tenderness.   Abdominal:      General: Abdomen is flat. Bowel sounds are normal. There is no distension.      Palpations: Abdomen is soft.      Tenderness: There is no abdominal  tenderness.   Musculoskeletal:      Right lower leg: No edema.      Left lower leg: No edema.   Skin:     General: Skin is warm and dry.      Capillary Refill: Capillary refill takes less than 2 seconds.      Coloration: Skin is not jaundiced or pale.      Findings: No bruising or erythema.   Neurological:      Mental Status: He is alert and oriented to person, place, and time.   Psychiatric:         Mood and Affect: Mood normal.         Behavior: Behavior normal.         Thought Content: Thought content normal.         Judgment: Judgment normal.         Discussion/Summary: Coronary artery disease, no details available.  Patient is clinically asymptomatic, no recent ischemic testing.  Will do stress test, he thinks he can walk on a treadmill.  Recent echocardiogram as described above without any wall motion abnormalities.  Will check lipid profile, he is on low intensity statin therapy.  Probably will require discussion.  Blood pressure appears to be a problem.  Will do ambulatory blood pressures and adjust accordingly, he is reluctant to take higher dose of lisinopril because of fear of possible hyperkalemia, I do not think that will be an issue, his most recent creatinine 1.1 GFR in the 60s.  Potassium 4.4.  Will probably add low-dose calcium channel blocker, he is on beta-blocker.  History of renal cell carcinoma diagnosed in 2018, with metastases including femoral metastasis requiring surgery.  He underwent chemotherapy immunotherapy.  He did not undergo nephrectomy.  Clinically in remission    This note was completed in part utilizing [employer] direct voice recognition software.   Grammatical errors, random word insertion, spelling mistakes, and incomplete sentences may be an occasional consequence of the system secondary to software limitations, ambient noise and hardware issues. At the time of dictation, efforts were made to edit, clarify and /or correct errors.  Please read the chart carefully and  recognize, using context, where substitutions have occurred.  If you have any questions or concerns about the context, text or information contained within the body of this dictation, please contact myself, the provider, for further clarification.

## 2024-08-29 ENCOUNTER — APPOINTMENT (OUTPATIENT)
Dept: LAB | Age: 79
End: 2024-08-29
Payer: MEDICARE

## 2024-08-29 DIAGNOSIS — C64.1 PRIMARY MALIGNANT NEOPLASM OF RIGHT KIDNEY WITH METASTASIS FROM KIDNEY TO OTHER SITE (HCC): ICD-10-CM

## 2024-08-29 DIAGNOSIS — E78.00 HYPERCHOLESTEROLEMIA: ICD-10-CM

## 2024-08-29 DIAGNOSIS — I21.9 MI (MYOCARDIAL INFARCTION) (HCC): ICD-10-CM

## 2024-08-29 LAB
ALBUMIN SERPL BCG-MCNC: 4 G/DL (ref 3.5–5)
ALP SERPL-CCNC: 57 U/L (ref 34–104)
ALT SERPL W P-5'-P-CCNC: 21 U/L (ref 7–52)
ANION GAP SERPL CALCULATED.3IONS-SCNC: 6 MMOL/L (ref 4–13)
AST SERPL W P-5'-P-CCNC: 17 U/L (ref 13–39)
BASOPHILS # BLD AUTO: 0.06 THOUSANDS/ÂΜL (ref 0–0.1)
BASOPHILS NFR BLD AUTO: 1 % (ref 0–1)
BILIRUB SERPL-MCNC: 0.62 MG/DL (ref 0.2–1)
BUN SERPL-MCNC: 20 MG/DL (ref 5–25)
CALCIUM SERPL-MCNC: 9.3 MG/DL (ref 8.4–10.2)
CHLORIDE SERPL-SCNC: 105 MMOL/L (ref 96–108)
CHOLEST SERPL-MCNC: 145 MG/DL
CO2 SERPL-SCNC: 28 MMOL/L (ref 21–32)
CREAT SERPL-MCNC: 1.25 MG/DL (ref 0.6–1.3)
EOSINOPHIL # BLD AUTO: 0.14 THOUSAND/ÂΜL (ref 0–0.61)
EOSINOPHIL NFR BLD AUTO: 2 % (ref 0–6)
ERYTHROCYTE [DISTWIDTH] IN BLOOD BY AUTOMATED COUNT: 12.6 % (ref 11.6–15.1)
GFR SERPL CREATININE-BSD FRML MDRD: 53 ML/MIN/1.73SQ M
GLUCOSE P FAST SERPL-MCNC: 89 MG/DL (ref 65–99)
HCT VFR BLD AUTO: 42.9 % (ref 36.5–49.3)
HDLC SERPL-MCNC: 29 MG/DL
HGB BLD-MCNC: 14.6 G/DL (ref 12–17)
IMM GRANULOCYTES # BLD AUTO: 0.03 THOUSAND/UL (ref 0–0.2)
IMM GRANULOCYTES NFR BLD AUTO: 0 % (ref 0–2)
LDLC SERPL CALC-MCNC: 91 MG/DL (ref 0–100)
LYMPHOCYTES # BLD AUTO: 1.62 THOUSANDS/ÂΜL (ref 0.6–4.47)
LYMPHOCYTES NFR BLD AUTO: 24 % (ref 14–44)
MCH RBC QN AUTO: 34.3 PG (ref 26.8–34.3)
MCHC RBC AUTO-ENTMCNC: 34 G/DL (ref 31.4–37.4)
MCV RBC AUTO: 101 FL (ref 82–98)
MONOCYTES # BLD AUTO: 0.68 THOUSAND/ÂΜL (ref 0.17–1.22)
MONOCYTES NFR BLD AUTO: 10 % (ref 4–12)
NEUTROPHILS # BLD AUTO: 4.32 THOUSANDS/ÂΜL (ref 1.85–7.62)
NEUTS SEG NFR BLD AUTO: 63 % (ref 43–75)
NRBC BLD AUTO-RTO: 0 /100 WBCS
PLATELET # BLD AUTO: 301 THOUSANDS/UL (ref 149–390)
PMV BLD AUTO: 9.1 FL (ref 8.9–12.7)
POTASSIUM SERPL-SCNC: 4.5 MMOL/L (ref 3.5–5.3)
PROT SERPL-MCNC: 6.2 G/DL (ref 6.4–8.4)
RBC # BLD AUTO: 4.26 MILLION/UL (ref 3.88–5.62)
SODIUM SERPL-SCNC: 139 MMOL/L (ref 135–147)
TRIGL SERPL-MCNC: 124 MG/DL
WBC # BLD AUTO: 6.85 THOUSAND/UL (ref 4.31–10.16)

## 2024-08-29 PROCEDURE — 80053 COMPREHEN METABOLIC PANEL: CPT

## 2024-08-29 PROCEDURE — 85025 COMPLETE CBC W/AUTO DIFF WBC: CPT

## 2024-08-29 PROCEDURE — 36415 COLL VENOUS BLD VENIPUNCTURE: CPT

## 2024-08-30 ENCOUNTER — TELEPHONE (OUTPATIENT)
Dept: CARDIOLOGY CLINIC | Facility: CLINIC | Age: 79
End: 2024-08-30

## 2024-08-30 NOTE — TELEPHONE ENCOUNTER
LVM for patient to call office back for medication increases due to Lipid Panel per Dr. Huffman - Increase pravastatin to 40 mg po qd, lipids in 2m     Please relay to patient if he calls back

## 2024-09-04 ENCOUNTER — OFFICE VISIT (OUTPATIENT)
Dept: HEMATOLOGY ONCOLOGY | Facility: CLINIC | Age: 79
End: 2024-09-04
Payer: MEDICARE

## 2024-09-04 VITALS
TEMPERATURE: 97.8 F | WEIGHT: 171 LBS | RESPIRATION RATE: 18 BRPM | SYSTOLIC BLOOD PRESSURE: 162 MMHG | HEIGHT: 68 IN | OXYGEN SATURATION: 98 % | BODY MASS INDEX: 25.91 KG/M2 | HEART RATE: 58 BPM | DIASTOLIC BLOOD PRESSURE: 76 MMHG

## 2024-09-04 DIAGNOSIS — C64.1 PRIMARY MALIGNANT NEOPLASM OF RIGHT KIDNEY WITH METASTASIS FROM KIDNEY TO OTHER SITE (HCC): ICD-10-CM

## 2024-09-04 DIAGNOSIS — N18.31 STAGE 3A CHRONIC KIDNEY DISEASE (HCC): ICD-10-CM

## 2024-09-04 DIAGNOSIS — C79.51 MALIGNANT NEOPLASM METASTATIC TO BONE (HCC): Primary | ICD-10-CM

## 2024-09-04 PROCEDURE — G2211 COMPLEX E/M VISIT ADD ON: HCPCS | Performed by: PHYSICIAN ASSISTANT

## 2024-09-04 PROCEDURE — 99214 OFFICE O/P EST MOD 30 MIN: CPT | Performed by: PHYSICIAN ASSISTANT

## 2024-09-04 NOTE — PROGRESS NOTES
Hematology/Oncology Outpatient Follow- up Note  Fabricio Castillo 79 y.o. 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 Status post ORIF followed by radiation therapy. He received 4 cycles of ipilimumab/nivolumab as of 11/01/2018 at [facility] in [place]     He had good response with subsequent continuation of nivolumab 480 mg every month   PET scan on 05/23/2022 showed favorable response involving the lungs, retroperitoneal, bilateral adrenal glands primary in the right kidney    8/2022 Nivolumab was discontinued due to colitis.  Xgeva Discontinued.     CAT scan in February 2023 showed stable left upper lobe lung nodule, bilateral small stable lung nodules     CAT scan in May 2023 showed stable disease no new lesions     CAT scan in September 2023 showed stable disease no new lesions as well     Repeat CAT scan in January 2024 showed stable left upper lobe lung nodule, no new lesion to suggest progression of disease at this time     7/2/24 noncontrast CT C/A/P- 1. Stable lung nodules including a 2.5 cm lingular mass and 8 mm right middle lobe nodule.  Stable calcified left adrenal nodule. Stable scarring in the right upper pole of the kidney without evidence of recurrent mass.    Continue with surveillance.  Follow-up in 6 months with CBC, CMP, noncontrast CT scan chest abdomen pelvis.       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    When he was golfing he had pain in the  left thigh area, with subsequent limping.     Bone scan on 06/2018 showed abnormal increased uptake within the left femoral neck, inter trochanteric area and proximal femur. MRI showed abnormal marrow signal within the left femoral neck, neck, intertrochanteric region with extension to the proximal diaphoresis with mild bone expansion and endosteal scalloping, enlarged prostate measuring 4.8 x 3.6 cm fullness of the right kidney.      Right kidney biopsy showed high-grade renal cell carcinoma, clear cell subtype with possibility of sarcomatoid variant, positive for vimentin, CD10, comp 5.2, negative for PAX8, melan a, S100 and TTF1     Status post embolization to the tumor of the left hip in August 2018, and left total hip arthroplasty with pathological fracture, metastatic high-grade and sarcomatoid carcinoma consistent with grade 4 renal cell carcinoma, workup showed invading of the right adrenal gland, lung, bone involvement avid on PET scan     Status post 4 cycles of ipilimumab/nivolumab as of 11/01/2018 followed by maintenance nivolumab 480 mg every 4 weeks status post 15 fraction of radiation therapy to the left area     Regarding history of melanoma on his face requiring sentinel lymph node biopsy, history of 2 areas of melanoma in the back and the leg     He had been receiving Xgeva as of 11/07/2019 on monthly basis in [place] under Dr. Adali Braden     PET scan on 05/23/2022 showed necrotic left upper lobe lung mass measuring 2.5 x 2.4 cm with significant decrease from the initial PET scan with mild uptake nodule in the right lower lobe of the lung measuring 1.1 x 0.7 cm unchanged, nodule within the superior segment of the right lower lobe of the lung measuring 0.9 x 0.7 cm, mild uptake in the inferior pole of the right kidney measuring 1.7 x 1.6 cm, mild mesenteric, retroperitoneal, iliac, inguinal lymph node right adrenal nodule measuring 1.0 x 0.7 cm, left adrenal nodule measuring 2.2 x 1.6 cm and stable  uptake in the left femoral head into the neck.     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     Interval History:   7/2/24 noncontrast CT C/A/P- 1. Stable lung nodules including a 2.5 cm lingular mass and 8 mm right middle lobe nodule.  Stable calcified left adrenal nodule. Stable scarring in the right upper pole of the kidney without evidence of recurrent mass.  8/29/24  CBCD stable; CMP stable       Interval History:        Review of Systems   Constitutional:  Negative for appetite change, chills, diaphoresis, fatigue, fever and unexpected weight change.   HENT:   Negative for mouth sores, nosebleeds, sore throat, tinnitus and voice change.    Eyes:  Negative for eye problems.   Respiratory:  Negative for chest tightness, cough, shortness of breath and wheezing.    Cardiovascular:  Negative for chest pain, leg swelling and palpitations.   Gastrointestinal:  Negative for abdominal distention, abdominal pain, blood in stool, constipation, diarrhea, nausea, rectal pain and vomiting.   Endocrine: Negative for hot flashes.   Genitourinary: Negative.     Musculoskeletal:  Positive for arthralgias. Negative for gait problem and myalgias.   Skin:  Negative for itching and rash.   Neurological:  Negative for dizziness, gait problem, headaches, light-headedness and numbness.   Hematological:  Negative for adenopathy.   Psychiatric/Behavioral:  Negative for confusion and sleep disturbance. The patient is not nervous/anxious.         Test Results:        Labs:   Lab Results   Component Value Date    HGB 14.6 08/29/2024    HCT 42.9 08/29/2024     (H) 08/29/2024      "08/29/2024    WBC 6.85 08/29/2024    NRBC 0 08/29/2024     Lab Results   Component Value Date    K 4.5 08/29/2024     08/29/2024    CO2 28 08/29/2024    BUN 20 08/29/2024    CREATININE 1.25 08/29/2024    GLUF 89 08/29/2024    CALCIUM 9.3 08/29/2024    CORRECTEDCA 9.4 08/11/2022    AST 17 08/29/2024    ALT 21 08/29/2024    ALKPHOS 57 08/29/2024    EGFR 53 08/29/2024           Imaging: No results found.          Allergies:   Allergies   Allergen Reactions    Latex Rash     Current Medications: Reviewed  PMH/FH/SH:  Reviewed      Physical Exam:    1.91 meters squared    Ht Readings from Last 3 Encounters:   09/04/24 5' 8\" (1.727 m)   08/23/24 5' 8\" (1.727 m)   02/02/24 5' 8\" (1.727 m)        Wt Readings from Last 3 Encounters:   09/04/24 77.6 kg (171 lb)   08/23/24 77.7 kg (171 lb 6.4 oz)   02/02/24 78.9 kg (174 lb)        Temp Readings from Last 3 Encounters:   09/04/24 97.8 °F (36.6 °C) (Temporal)   05/18/24 98 °F (36.7 °C) (Oral)   02/02/24 97.7 °F (36.5 °C) (Temporal)        BP Readings from Last 3 Encounters:   09/04/24 162/76   08/23/24 (!) 192/90   05/19/24 167/87             Physical Exam  Vitals reviewed.   Constitutional:       General: He is not in acute distress.     Appearance: He is well-developed. He is not diaphoretic.   HENT:      Head: Normocephalic and atraumatic.   Eyes:      Conjunctiva/sclera: Conjunctivae normal.   Neck:      Trachea: No tracheal deviation.   Cardiovascular:      Rate and Rhythm: Normal rate and regular rhythm.      Heart sounds: No murmur heard.     No friction rub. No gallop.   Pulmonary:      Effort: Pulmonary effort is normal. No respiratory distress.      Breath sounds: Normal breath sounds. No wheezing or rales.   Chest:      Chest wall: No tenderness.   Abdominal:      General: There is no distension.      Palpations: Abdomen is soft.      Tenderness: There is no abdominal tenderness.   Musculoskeletal:      Cervical back: Normal range of motion and neck supple. "   Lymphadenopathy:      Cervical: No cervical adenopathy.   Skin:     General: Skin is warm and dry.      Coloration: Skin is not pale.      Findings: No erythema.   Neurological:      Mental Status: He is alert. Mental status is at baseline.   Psychiatric:         Behavior: Behavior normal.         Thought Content: Thought content normal.         EC[employer]      Emergency Contacts:    Extended Emergency Contact Information  Primary Emergency Contact: Korey Castilloette  Mobile Phone: [phone]  Relation: Spouse

## 2024-09-09 ENCOUNTER — TELEPHONE (OUTPATIENT)
Dept: CARDIOLOGY CLINIC | Facility: CLINIC | Age: 79
End: 2024-09-09

## 2024-09-09 DIAGNOSIS — I15.9 SECONDARY HYPERTENSION: ICD-10-CM

## 2024-09-09 DIAGNOSIS — I10 PRIMARY HYPERTENSION: Primary | ICD-10-CM

## 2024-09-09 RX ORDER — AMLODIPINE BESYLATE 2.5 MG/1
2.5 TABLET ORAL DAILY
Qty: 30 TABLET | Refills: 3 | Status: SHIPPED | OUTPATIENT
Start: 2024-09-09 | End: 2024-09-09 | Stop reason: SDUPTHER

## 2024-09-09 NOTE — TELEPHONE ENCOUNTER
Requested medication(s) are due for refill today: Yes  Patient has already received a courtesy refill: No  Other reason request has been forwarded to provider: Sorry, It came through here again and have to send to sign

## 2024-09-09 NOTE — TELEPHONE ENCOUNTER
Caller: Chris Castillo    Doctor: Dr. Huffman    Call back #: [phone]    Reason for call: Patient called to return Zaina's call. I attempted to transfer patient over, but was unsuccessful with contacting the office. Patient would like a call back regarding Dr. Huffman's medication advice. Thank you.

## 2024-09-09 NOTE — TELEPHONE ENCOUNTER
Fabricio Castillo    Patient of Dr. Huffman     Patient is calling to give us 2 week average BP Readings that Dr. Huffman requested.     153/76 is his average for 2 weeks       If any questions please reach out to pt @[phone]

## 2024-09-10 RX ORDER — AMLODIPINE BESYLATE 2.5 MG/1
2.5 TABLET ORAL DAILY
Qty: 30 TABLET | Refills: 3 | Status: SHIPPED | OUTPATIENT
Start: 2024-09-10

## 2024-09-16 ENCOUNTER — HOSPITAL ENCOUNTER (OUTPATIENT)
Dept: NON INVASIVE DIAGNOSTICS | Facility: CLINIC | Age: 79
Discharge: HOME | End: 2024-09-16
Payer: MEDICARE

## 2024-09-16 VITALS
DIASTOLIC BLOOD PRESSURE: 86 MMHG | SYSTOLIC BLOOD PRESSURE: 154 MMHG | OXYGEN SATURATION: 98 % | WEIGHT: 171 LBS | HEART RATE: 55 BPM | HEIGHT: 68 IN | BODY MASS INDEX: 25.91 KG/M2

## 2024-09-16 DIAGNOSIS — I10 PRIMARY HYPERTENSION: ICD-10-CM

## 2024-09-16 DIAGNOSIS — I21.9 MI (MYOCARDIAL INFARCTION) (HCC): ICD-10-CM

## 2024-09-16 DIAGNOSIS — I11.9 HYPERTENSIVE HEART DISEASE WITHOUT HEART FAILURE: ICD-10-CM

## 2024-09-16 LAB
MAX HR PERCENT: 74 %
MAX HR: 104 BPM
RATE PRESSURE PRODUCT: NORMAL
SL CV STRESS RECOVERY BP: NORMAL MMHG
SL CV STRESS RECOVERY HR: 68 BPM
SL CV STRESS RECOVERY O2 SAT: 98 %
SL CV STRESS STAGE REACHED: 3
STRESS ANGINA INDEX: 0
STRESS BASELINE BP: NORMAL MMHG
STRESS BASELINE HR: 55 BPM
STRESS O2 SAT REST: 98 %
STRESS PEAK HR: 100 BPM
STRESS POST ESTIMATED WORKLOAD: 7.1 METS
STRESS POST EXERCISE DUR MIN: 6 MIN
STRESS POST EXERCISE DUR SEC: 46 SEC
STRESS POST O2 SAT PEAK: 97 %
STRESS POST PEAK BP: 166 MMHG

## 2024-09-16 PROCEDURE — 93017 CV STRESS TEST TRACING ONLY: CPT

## 2024-09-16 PROCEDURE — 93018 CV STRESS TEST I&R ONLY: CPT | Performed by: INTERNAL MEDICINE

## 2024-09-16 PROCEDURE — 93016 CV STRESS TEST SUPVJ ONLY: CPT | Performed by: INTERNAL MEDICINE

## 2024-09-17 LAB
CHEST PAIN STATEMENT: NORMAL
MAX DIASTOLIC BP: 88 MMHG
MAX PREDICTED HEART RATE: 139 BPM
PROTOCOL NAME: NORMAL
REASON FOR TERMINATION: NORMAL
STRESS POST EXERCISE DUR MIN: 6 MIN
STRESS POST EXERCISE DUR SEC: 46 SEC
STRESS POST PEAK HR: 104 BPM
STRESS POST PEAK SYSTOLIC BP: 166 MMHG
TARGET HR FORMULA: NORMAL
TEST INDICATION: NORMAL

## 2024-10-02 ENCOUNTER — TELEPHONE (OUTPATIENT)
Age: 79
End: 2024-10-02

## 2024-10-02 NOTE — TELEPHONE ENCOUNTER
Pt called at request of Dr. Huffman to monitor BP.  Amlodipine 2.5 mg daily was added to regimen on 9/9.    Pt states his average BP for 3 weeks was 141/69.  No report of symptoms.

## 2024-10-03 NOTE — TELEPHONE ENCOUNTER
Relayed Dr. Huffman's message that he has no further recommendations per BP charting. Pt understood

## 2024-10-09 ENCOUNTER — OFFICE VISIT (OUTPATIENT)
Dept: FAMILY MEDICINE CLINIC | Facility: CLINIC | Age: 79
End: 2024-10-09
Payer: MEDICARE

## 2024-10-09 VITALS
HEART RATE: 56 BPM | WEIGHT: 173.2 LBS | HEIGHT: 67 IN | TEMPERATURE: 96.5 F | BODY MASS INDEX: 27.18 KG/M2 | OXYGEN SATURATION: 100 % | SYSTOLIC BLOOD PRESSURE: 141 MMHG | DIASTOLIC BLOOD PRESSURE: 69 MMHG | RESPIRATION RATE: 18 BRPM

## 2024-10-09 DIAGNOSIS — Z00.00 MEDICARE ANNUAL WELLNESS VISIT, SUBSEQUENT: Primary | ICD-10-CM

## 2024-10-09 DIAGNOSIS — I25.10 CORONARY ARTERY DISEASE INVOLVING NATIVE CORONARY ARTERY OF NATIVE HEART WITHOUT ANGINA PECTORIS: ICD-10-CM

## 2024-10-09 DIAGNOSIS — I25.2 HISTORY OF MI (MYOCARDIAL INFARCTION): ICD-10-CM

## 2024-10-09 DIAGNOSIS — N18.31 STAGE 3A CHRONIC KIDNEY DISEASE (HCC): ICD-10-CM

## 2024-10-09 DIAGNOSIS — C64.1 PRIMARY MALIGNANT NEOPLASM OF RIGHT KIDNEY WITH METASTASIS FROM KIDNEY TO OTHER SITE (HCC): ICD-10-CM

## 2024-10-09 DIAGNOSIS — E78.00 HYPERCHOLESTEROLEMIA: ICD-10-CM

## 2024-10-09 DIAGNOSIS — Z85.820 HISTORY OF MELANOMA: ICD-10-CM

## 2024-10-09 PROCEDURE — 99203 OFFICE O/P NEW LOW 30 MIN: CPT | Performed by: FAMILY MEDICINE

## 2024-10-09 PROCEDURE — G0438 PPPS, INITIAL VISIT: HCPCS | Performed by: FAMILY MEDICINE

## 2024-10-09 NOTE — PROGRESS NOTES
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 Verma/Masoud Em         Stage 3a chronic kidney disease (HCC)  Lab Results   Component Value Date    EGFR 53 2024    EGFR 61 2024    EGFR 51 2023    CREATININE 1.25 2024    CREATININE 1.13 2024    CREATININE 1.31 (H) 2023   Monitoring kidney function         Hypercholesterolemia  On pravastatin    Orders:    Comprehensive metabolic panel; Future    CBC; Future    Lipid Panel with Direct LDL reflex; Future    TSH, 3rd generation with Free T4 reflex; Future    History of melanoma  Hx of effudex treatment on other areas    Orders:    Ambulatory Referral to Dermatology; Future      Depression Screening and Follow-up Plan: Patient was screened for depression during today's encounter. They screened negative with a PHQ-2 score of 0.      Preventive health issues were discussed with patient, and age appropriate screening tests were ordered as noted in patient's After Visit Summary. Personalized health advice and appropriate referrals for health education or preventive services given if needed, as noted in patient's After Visit Summary.    History of Present Illness     Here for wellness  141/69 average at home  Dr Simmons new cardiology- MI , stress test 2024  Dr Verma heme/onc- stage 4 renal cell carcinoma  Will see Dr. Simmons every 9 months  Will monitor bp's  Declines flu  Initial covid  vaccines  Declines pneumonia and shingles vaccines       Patient Care Team:  Antonia Hines DO as PCP - General (Family Medicine)  Nikkie Verma MD as Medical Oncologist (Hematology)  Justo Celeste MD (Radiation Oncology)  Monse Em PA-C (Hematology and Oncology)    Review of Systems   Constitutional: Negative.    HENT: Negative.     Eyes: Negative.    Respiratory: Negative.     Cardiovascular: Negative.    Gastrointestinal: Negative.    Endocrine: Negative.    Genitourinary: Negative.    Musculoskeletal:  Positive for arthralgias (left hip pain).   Allergic/Immunologic: Negative.    Neurological: Negative.    Hematological: Negative.    Psychiatric/Behavioral: Negative.       Medical History Reviewed by provider this encounter:  Tobacco  Allergies  Meds  Problems  Med Hx  Surg Hx  Fam Hx       Annual Wellness Visit Questionnaire   Fabricio is here for his Subsequent Wellness visit.     Health Risk Assessment:   Patient rates overall health as good. Patient feels that their physical health rating is same. Patient is very satisfied with their life. Eyesight was rated as same. Hearing was rated as same. Patient feels that their emotional and mental health rating is same. Patients states they are never, rarely angry. Patient states they are never, rarely unusually tired/fatigued. Pain experienced in the last 7 days has been none. Patient states that he has experienced no weight loss or gain in last 6 months.     Depression Screening:   PHQ-2 Score: 0      Fall Risk Screening:   In the past year, patient has experienced: no history of falling in past year      Home Safety:  Patient has trouble with stairs inside or outside of their home. Patient has working smoke alarms and has working carbon monoxide detector. Home safety hazards include: none.     Nutrition:   Current diet is Regular.     Medications:   Patient is currently taking over-the-counter supplements. OTC medications include: see  medication list. Patient is able to manage medications.     Activities of Daily Living (ADLs)/Instrumental Activities of Daily Living (IADLs):   Walk and transfer into and out of bed and chair?: Yes  Dress and groom yourself?: Yes    Bathe or shower yourself?: Yes    Feed yourself? Yes  Do your laundry/housekeeping?: Yes  Manage your money, pay your bills and track your expenses?: Yes  Make your own meals?: Yes    Do your own shopping?: Yes    Previous Hospitalizations:   Any hospitalizations or ED visits within the last 12 months?: Yes    How many hospitalizations have you had in the last year?: 1-2    Hospitalization Comments: 2024    Advance Care Planning:   Living will: Yes    Durable POA for healthcare: Yes    Advanced directive: Yes      PREVENTIVE SCREENINGS      Cardiovascular Screening:    General: Screening Not Indicated and History Lipid Disorder      Diabetes Screening:     General: Screening Current      Prostate Cancer Screening:    General: Screening Not Indicated      Abdominal Aortic Aneurysm (AAA) Screening:    Risk factors include: tobacco use        Lung Cancer Screening:     General: Screening Not Indicated    Screening, Brief Intervention, and Referral to Treatment (SBIRT)    Screening      AUDIT-C Screenin) How often did you have a drink containing alcohol in the past year? never  2) How many drinks did you have on a typical day when you were drinking in the past year? 0  3) How often did you have 6 or more drinks on one occasion in the past year? never    AUDIT-C Score: 0  Interpretation: Score 0-3 (male): Negative screen for alcohol misuse    Single Item Drug Screening:  How often have you used an illegal drug (including marijuana) or a prescription medication for non-medical reasons in the past year? never    Single Item Drug Screen Score: 0  Interpretation: Negative screen for possible drug use disorder    Social Determinants of Health     Food Insecurity: No Food Insecurity  "(10/9/2024)    Hunger Vital Sign     Worried About Running Out of Food in the Last Year: Never true     Ran Out of Food in the Last Year: Never true   Transportation Needs: No Transportation Needs (10/9/2024)    PRAPARE - Transportation     Lack of Transportation (Medical): No     Lack of Transportation (Non-Medical): No   Housing Stability: Low Risk  (10/9/2024)    Housing Stability Vital Sign     Unable to Pay for Housing in the Last Year: No     Number of Times Moved in the Last Year: 0     Homeless in the Last Year: No   Utilities: Not At Risk (10/9/2024)    [facility] Utilities     Threatened with loss of utilities: No     No results found.    Objective     /69   Pulse 56   Temp (!) 96.5 °F (35.8 °C) (Tympanic)   Resp 18   Ht 5' 7.13\" (1.705 m)   Wt 78.6 kg (173 lb 3.2 oz)   SpO2 100%   BMI 27.03 kg/m²     Physical Exam  Vitals and nursing note reviewed.   Constitutional:       Appearance: Normal appearance. He is well-developed.   HENT:      Head: Normocephalic and atraumatic.      Right Ear: External ear normal.      Left Ear: External ear normal.      Nose: Nose normal.   Eyes:      Conjunctiva/sclera: Conjunctivae normal.      Pupils: Pupils are equal, round, and reactive to light.   Cardiovascular:      Rate and Rhythm: Normal rate and regular rhythm.      Heart sounds: Normal heart sounds.   Pulmonary:      Effort: Pulmonary effort is normal.      Breath sounds: Normal breath sounds.   Abdominal:      General: Bowel sounds are normal.      Palpations: Abdomen is soft.   Musculoskeletal:         General: Normal range of motion.      Cervical back: Normal range of motion and neck supple.   Skin:     General: Skin is warm and dry.      Capillary Refill: Capillary refill takes less than 2 seconds.   Neurological:      Mental Status: He is alert and oriented to person, place, and time.   Psychiatric:         Behavior: Behavior normal.         Thought Content: Thought content normal.         Judgment: " Judgment normal.

## 2024-10-09 NOTE — PATIENT INSTRUCTIONS
Medicare Preventive Visit Patient Instructions  Thank you for completing your Welcome to Medicare Visit or Medicare Annual Wellness Visit today. Your next wellness visit will be due in one year (10/10/2025).  The screening/preventive services that you may require over the next 5-10 years are detailed below. Some tests may not apply to you based off risk factors and/or age. Screening tests ordered at today's visit but not completed yet may show as past due. Also, please note that scanned in results may not display below.  Preventive Screenings:  Service Recommendations Previous Testing/Comments   Colorectal Cancer Screening  Colonoscopy    Fecal Occult Blood Test (FOBT)/Fecal Immunochemical Test (FIT)  Fecal DNA/Cologuard Test  Flexible Sigmoidoscopy Age: 45-75 years old   Colonoscopy: every 10 years (May be performed more frequently if at higher risk)  OR  FOBT/FIT: every 1 year  OR  Cologuard: every 3 years  OR  Sigmoidoscopy: every 5 years  Screening may be recommended earlier than age 45 if at higher risk for colorectal cancer. Also, an individualized decision between you and your healthcare provider will decide whether screening between the ages of 76-85 would be appropriate. Colonoscopy: 08/02/2022  FOBT/FIT: Not on file  Cologuard: Not on file  Sigmoidoscopy: Not on file          Prostate Cancer Screening Individualized decision between patient and health care provider in men between ages of 55-69   Medicare will cover every 12 months beginning on the day after your 50th birthday PSA: No results in last 5 years     Screening Not Indicated     Hepatitis C Screening Once for adults born between 1945 and 1965  More frequently in patients at high risk for Hepatitis C Hep C Antibody: Not on file        Diabetes Screening 1-2 times per year if you're at risk for diabetes or have pre-diabetes Fasting glucose: 89 mg/dL (8/29/2024)  A1C: No results in last 5 years (No results in last 5 years)  Screening Current    Cholesterol Screening Once every 5 years if you don't have a lipid disorder. May order more often based on risk factors. Lipid panel: 08/29/2024  Screening Not Indicated  History Lipid Disorder      Other Preventive Screenings Covered by Medicare:  Abdominal Aortic Aneurysm (AAA) Screening: covered once if your at risk. You're considered to be at risk if you have a family history of AAA or a male between the age of 65-75 who smoking at least 100 cigarettes in your lifetime.  Lung Cancer Screening: covers low dose CT scan once per year if you meet all of the following conditions: (1) Age 55-77; (2) No signs or symptoms of lung cancer; (3) Current smoker or have quit smoking within the last 15 years; (4) You have a tobacco smoking history of at least 20 pack years (packs per day x number of years you smoked); (5) You get a written order from a healthcare provider.  Glaucoma Screening: covered annually if you're considered high risk: (1) You have diabetes OR (2) Family history of glaucoma OR (3)  aged 50 and older OR (4)  American aged 65 and older  Osteoporosis Screening: covered every 2 years if you meet one of the following conditions: (1) Have a vertebral abnormality; (2) On glucocorticoid therapy for more than 3 months; (3) Have primary hyperparathyroidism; (4) On osteoporosis medications and need to assess response to drug therapy.  HIV Screening: covered annually if you're between the age of 15-65. Also covered annually if you are younger than 15 and older than 65 with risk factors for HIV infection. For pregnant patients, it is covered up to 3 times per pregnancy.    Immunizations:  Immunization Recommendations   Influenza Vaccine Annual influenza vaccination during flu season is recommended for all persons aged >= 6 months who do not have contraindications   Pneumococcal Vaccine   * Pneumococcal conjugate vaccine = PCV13 (Prevnar 13), PCV15 (Vaxneuvance), PCV20 (Prevnar 20)  *  Pneumococcal polysaccharide vaccine = PPSV23 (Pneumovax) Adults 19-65 yo with certain risk factors or if 65+ yo  If never received any pneumonia vaccine: recommend Prevnar 20 (PCV20)  Give PCV20 if previously received 1 dose of PCV13 or PPSV23   Hepatitis B Vaccine 3 dose series if at intermediate or high risk (ex: diabetes, end stage renal disease, liver disease)   Respiratory syncytial virus (RSV) Vaccine - COVERED BY MEDICARE PART D  * RSVPreF3 (Arexvy) CDC recommends that adults 60 years of age and older may receive a single dose of RSV vaccine using shared clinical decision-making (SCDM)   Tetanus (Td) Vaccine - COST NOT COVERED BY MEDICARE PART B Following completion of primary series, a booster dose should be given every 10 years to maintain immunity against tetanus. Td may also be given as tetanus wound prophylaxis.   Tdap Vaccine - COST NOT COVERED BY MEDICARE PART B Recommended at least once for all adults. For pregnant patients, recommended with each pregnancy.   Shingles Vaccine (Shingrix) - COST NOT COVERED BY MEDICARE PART B  2 shot series recommended in those 19 years and older who have or will have weakened immune systems or those 50 years and older     Health Maintenance Due:  There are no preventive care reminders to display for this patient.  Immunizations Due:      Topic Date Due   • COVID-19 Vaccine (1) Never done   • Pneumococcal Vaccine: 65+ Years (1 of 2 - PCV) Never done   • Influenza Vaccine (1) Never done     Advance Directives   What are advance directives?  Advance directives are legal documents that state your wishes and plans for medical care. These plans are made ahead of time in case you lose your ability to make decisions for yourself. Advance directives can apply to any medical decision, such as the treatments you want, and if you want to donate organs.   What are the types of advance directives?  There are many types of advance directives, and each state has rules about how to use  them. You may choose a combination of any of the following:  Living will:  This is a written record of the treatment you want. You can also choose which treatments you do not want, which to limit, and which to stop at a certain time. This includes surgery, medicine, IV fluid, and tube feedings.   Durable power of  for healthcare (DPAHC):  This is a written record that states who you want to make healthcare choices for you when you are unable to make them for yourself. This person, called a proxy, is usually a family member or a friend. You may choose more than 1 proxy.  Do not resuscitate (DNR) order:  A DNR order is used in case your heart stops beating or you stop breathing. It is a request not to have certain forms of treatment, such as CPR. A DNR order may be included in other types of advance directives.  Medical directive:  This covers the care that you want if you are in a coma, near death, or unable to make decisions for yourself. You can list the treatments you want for each condition. Treatment may include pain medicine, surgery, blood transfusions, dialysis, IV or tube feedings, and a ventilator (breathing machine).  Values history:  This document has questions about your views, beliefs, and how you feel and think about life. This information can help others choose the care that you would choose.  Why are advance directives important?  An advance directive helps you control your care. Although spoken wishes may be used, it is better to have your wishes written down. Spoken wishes can be misunderstood, or not followed. Treatments may be given even if you do not want them. An advance directive may make it easier for your family to make difficult choices about your care.   Weight Management   Why it is important to manage your weight:  Being overweight increases your risk of health conditions such as heart disease, high blood pressure, type 2 diabetes, and certain types of cancer. It can also  increase your risk for osteoarthritis, sleep apnea, and other respiratory problems. Aim for a slow, steady weight loss. Even a small amount of weight loss can lower your risk of health problems.  How to lose weight safely:  A safe and healthy way to lose weight is to eat fewer calories and get regular exercise. You can lose up about 1 pound a week by decreasing the number of calories you eat by 500 calories each day.   Healthy meal plan for weight management:  A healthy meal plan includes a variety of foods, contains fewer calories, and helps you stay healthy. A healthy meal plan includes the following:  Eat whole-grain foods more often.  A healthy meal plan should contain fiber. Fiber is the part of grains, fruits, and vegetables that is not broken down by your body. Whole-grain foods are healthy and provide extra fiber in your diet. Some examples of whole-grain foods are whole-wheat breads and pastas, oatmeal, brown rice, and bulgur.  Eat a variety of vegetables every day.  Include dark, leafy greens such as spinach, kale, [place] greens, and mustard greens. Eat yellow and orange vegetables such as carrots, sweet potatoes, and winter squash.   Eat a variety of fruits every day.  Choose fresh or canned fruit (canned in its own juice or light syrup) instead of juice. Fruit juice has very little or no fiber.  Eat low-fat dairy foods.  Drink fat-free (skim) milk or 1% milk. Eat fat-free yogurt and low-fat cottage cheese. Try low-fat cheeses such as mozzarella and other reduced-fat cheeses.  Choose meat and other protein foods that are low in fat.  Choose beans or other legumes such as split peas or lentils. Choose fish, skinless poultry (chicken or turkey), or lean cuts of red meat (beef or pork). Before you cook meat or poultry, cut off any visible fat.   Use less fat and oil.  Try baking foods instead of frying them. Add less fat, such as margarine, sour cream, regular salad dressing and mayonnaise to foods. Eat  fewer high-fat foods. Some examples of high-fat foods include french fries, doughnuts, ice cream, and cakes.  Eat fewer sweets.  Limit foods and drinks that are high in sugar. This includes candy, cookies, regular soda, and sweetened drinks.  Exercise:  Exercise at least 30 minutes per day on most days of the week. Some examples of exercise include walking, biking, dancing, and swimming. You can also fit in more physical activity by taking the stairs instead of the elevator or parking farther away from stores. Ask your healthcare provider about the best exercise plan for you.      © Copyright [employer] 2018 Information is for End User's use only and may not be sold, redistributed or otherwise used for commercial purposes. All illustrations and images included in CareNotes® are the copyrighted property of [employer] Inc[employer] or [employer]

## 2024-10-09 NOTE — ASSESSMENT & PLAN NOTE
Lab Results   Component Value Date    EGFR 53 08/29/2024    EGFR 61 01/17/2024    EGFR 51 09/19/2023    CREATININE 1.25 08/29/2024    CREATININE 1.13 01/17/2024    CREATININE 1.31 (H) 09/19/2023   Monitoring kidney function

## 2024-10-09 NOTE — ASSESSMENT & PLAN NOTE
On pravastatin    Orders:    Comprehensive metabolic panel; Future    CBC; Future    Lipid Panel with Direct LDL reflex; Future    TSH, 3rd generation with Free T4 reflex; Future     (2) Forgets Limitations

## 2024-10-27 DIAGNOSIS — I10 PRIMARY HYPERTENSION: ICD-10-CM

## 2024-10-27 DIAGNOSIS — E78.00 HYPERCHOLESTEROLEMIA: ICD-10-CM

## 2024-10-29 RX ORDER — PRAVASTATIN SODIUM 10 MG
10 TABLET ORAL DAILY
Qty: 90 TABLET | Refills: 3 | Status: SHIPPED | OUTPATIENT
Start: 2024-10-29

## 2024-10-29 RX ORDER — ATENOLOL 25 MG/1
25 TABLET ORAL DAILY
Qty: 90 TABLET | Refills: 3 | Status: SHIPPED | OUTPATIENT
Start: 2024-10-29

## 2024-11-08 PROBLEM — Z00.00 MEDICARE ANNUAL WELLNESS VISIT, SUBSEQUENT: Status: RESOLVED | Noted: 2024-10-09 | Resolved: 2024-11-08

## 2025-01-02 ENCOUNTER — TELEPHONE (OUTPATIENT)
Age: 80
End: 2025-01-02

## 2025-01-02 DIAGNOSIS — I15.9 SECONDARY HYPERTENSION: ICD-10-CM

## 2025-01-02 DIAGNOSIS — I10 PRIMARY HYPERTENSION: ICD-10-CM

## 2025-01-02 RX ORDER — AMLODIPINE BESYLATE 2.5 MG/1
2.5 TABLET ORAL DAILY
Qty: 90 TABLET | Refills: 1 | Status: SHIPPED | OUTPATIENT
Start: 2025-01-02

## 2025-01-02 NOTE — TELEPHONE ENCOUNTER
Got disconnected. I got patient's info and the medication he needed but I did not get to verify the pharmacy. The medication does look like it may have a refill, I did not get to tell his wife that.

## 2025-01-02 NOTE — TELEPHONE ENCOUNTER
Patient called pharmacy, Does not have refill on file needs new prescription sent NOT A DUPLICATE   Reason for call:   [x] Refill   [] Prior Auth  [] Other:     Office:   [] PCP/Provider -   [x] Specialty/Provider - CARD Jose Huffman    Medication: Amlodipine    Dose/Frequency: 2.5 mg Daily     Quantity: 30    Pharmacy: [facility]     Does the patient have enough for 3 days?   [] Yes   [x] No - Send as HP to POD

## 2025-01-02 NOTE — TELEPHONE ENCOUNTER
Pt verified he has not had Xgeva in a few years. Pt is aware he is okay to have dental cleaning. Pt was appreciative of the call.

## 2025-01-02 NOTE — TELEPHONE ENCOUNTER
Patient calling in, stated he was told to call and inform if he had any sort of dental appt to make sure it was okay. He stated he has a cleaning on 1/7 and would like to confirm if this is okay with Dr Verma. Hank # [phone]

## 2025-02-18 ENCOUNTER — TELEPHONE (OUTPATIENT)
Dept: DERMATOLOGY | Facility: CLINIC | Age: 80
End: 2025-02-18

## 2025-02-18 NOTE — TELEPHONE ENCOUNTER
LM For patient to call back. Patients PCP sent in a referral for dermatology. Patient to call back if wants an appt. Please message me when he calls.

## 2025-02-26 ENCOUNTER — APPOINTMENT (OUTPATIENT)
Dept: LAB | Age: 80
End: 2025-02-26
Payer: MEDICARE

## 2025-02-26 DIAGNOSIS — C64.1 PRIMARY MALIGNANT NEOPLASM OF RIGHT KIDNEY WITH METASTASIS FROM KIDNEY TO OTHER SITE (HCC): ICD-10-CM

## 2025-02-26 LAB
ALBUMIN SERPL BCG-MCNC: 4.1 G/DL (ref 3.5–5)
ALP SERPL-CCNC: 52 U/L (ref 34–104)
ALT SERPL W P-5'-P-CCNC: 17 U/L (ref 7–52)
ANION GAP SERPL CALCULATED.3IONS-SCNC: 6 MMOL/L (ref 4–13)
AST SERPL W P-5'-P-CCNC: 16 U/L (ref 13–39)
BASOPHILS # BLD AUTO: 0.05 THOUSANDS/ÂΜL (ref 0–0.1)
BASOPHILS NFR BLD AUTO: 1 % (ref 0–1)
BILIRUB SERPL-MCNC: 0.67 MG/DL (ref 0.2–1)
BUN SERPL-MCNC: 15 MG/DL (ref 5–25)
CALCIUM SERPL-MCNC: 9.5 MG/DL (ref 8.4–10.2)
CHLORIDE SERPL-SCNC: 105 MMOL/L (ref 96–108)
CO2 SERPL-SCNC: 28 MMOL/L (ref 21–32)
CREAT SERPL-MCNC: 1.21 MG/DL (ref 0.6–1.3)
EOSINOPHIL # BLD AUTO: 0.09 THOUSAND/ÂΜL (ref 0–0.61)
EOSINOPHIL NFR BLD AUTO: 1 % (ref 0–6)
ERYTHROCYTE [DISTWIDTH] IN BLOOD BY AUTOMATED COUNT: 12.7 % (ref 11.6–15.1)
GFR SERPL CREATININE-BSD FRML MDRD: 55 ML/MIN/1.73SQ M
GLUCOSE SERPL-MCNC: 106 MG/DL (ref 65–140)
HCT VFR BLD AUTO: 42.4 % (ref 36.5–49.3)
HGB BLD-MCNC: 14.3 G/DL (ref 12–17)
IMM GRANULOCYTES # BLD AUTO: 0.03 THOUSAND/UL (ref 0–0.2)
IMM GRANULOCYTES NFR BLD AUTO: 0 % (ref 0–2)
LYMPHOCYTES # BLD AUTO: 1.29 THOUSANDS/ÂΜL (ref 0.6–4.47)
LYMPHOCYTES NFR BLD AUTO: 19 % (ref 14–44)
MCH RBC QN AUTO: 33 PG (ref 26.8–34.3)
MCHC RBC AUTO-ENTMCNC: 33.7 G/DL (ref 31.4–37.4)
MCV RBC AUTO: 98 FL (ref 82–98)
MONOCYTES # BLD AUTO: 0.6 THOUSAND/ÂΜL (ref 0.17–1.22)
MONOCYTES NFR BLD AUTO: 9 % (ref 4–12)
NEUTROPHILS # BLD AUTO: 4.61 THOUSANDS/ÂΜL (ref 1.85–7.62)
NEUTS SEG NFR BLD AUTO: 70 % (ref 43–75)
NRBC BLD AUTO-RTO: 0 /100 WBCS
PLATELET # BLD AUTO: 276 THOUSANDS/UL (ref 149–390)
PMV BLD AUTO: 9.2 FL (ref 8.9–12.7)
POTASSIUM SERPL-SCNC: 5 MMOL/L (ref 3.5–5.3)
PROT SERPL-MCNC: 6.8 G/DL (ref 6.4–8.4)
RBC # BLD AUTO: 4.33 MILLION/UL (ref 3.88–5.62)
SODIUM SERPL-SCNC: 139 MMOL/L (ref 135–147)
WBC # BLD AUTO: 6.67 THOUSAND/UL (ref 4.31–10.16)

## 2025-02-26 PROCEDURE — 80053 COMPREHEN METABOLIC PANEL: CPT

## 2025-02-26 PROCEDURE — 36415 COLL VENOUS BLD VENIPUNCTURE: CPT

## 2025-02-26 PROCEDURE — 85025 COMPLETE CBC W/AUTO DIFF WBC: CPT

## 2025-02-28 ENCOUNTER — HOSPITAL ENCOUNTER (OUTPATIENT)
Dept: CT IMAGING | Facility: HOSPITAL | Age: 80
Discharge: HOME | End: 2025-02-28
Payer: MEDICARE

## 2025-02-28 DIAGNOSIS — C64.1 PRIMARY MALIGNANT NEOPLASM OF RIGHT KIDNEY WITH METASTASIS FROM KIDNEY TO OTHER SITE (HCC): ICD-10-CM

## 2025-02-28 PROCEDURE — 71250 CT THORAX DX C-: CPT

## 2025-02-28 PROCEDURE — 74176 CT ABD & PELVIS W/O CONTRAST: CPT

## 2025-03-06 ENCOUNTER — OFFICE VISIT (OUTPATIENT)
Dept: HEMATOLOGY ONCOLOGY | Facility: CLINIC | Age: 80
End: 2025-03-06
Payer: MEDICARE

## 2025-03-06 VITALS
DIASTOLIC BLOOD PRESSURE: 82 MMHG | OXYGEN SATURATION: 99 % | RESPIRATION RATE: 16 BRPM | WEIGHT: 174 LBS | TEMPERATURE: 98.2 F | HEART RATE: 66 BPM | BODY MASS INDEX: 26.37 KG/M2 | SYSTOLIC BLOOD PRESSURE: 138 MMHG | HEIGHT: 68 IN

## 2025-03-06 DIAGNOSIS — C64.1 PRIMARY MALIGNANT NEOPLASM OF RIGHT KIDNEY WITH METASTASIS FROM KIDNEY TO OTHER SITE (HCC): Primary | ICD-10-CM

## 2025-03-06 PROCEDURE — 99214 OFFICE O/P EST MOD 30 MIN: CPT | Performed by: INTERNAL MEDICINE

## 2025-03-06 NOTE — PROGRESS NOTES
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 Status post ORIF followed by radiation therapy. He received 4 cycles of ipilimumab/nivolumab as of 2018 at [facility] in [place], [place]     He had good response with subsequent continuation of nivolumab 480 mg every month   PET scan on 2022 showed favorable response involving the lungs, retroperitoneal, bilateral adrenal glands primary in the right kidney     2022 Nivolumab was discontinued due to colitis.  Xgeva Discontinued.     CAT scan in 2023 showed stable left upper lobe lung nodule, bilateral small stable lung nodules     CAT scan in May 2023 showed stable disease no new lesions     CAT scan in 2023 showed stable disease no new lesions as well     Repeat CAT scan in 2024 showed stable left upper lobe lung nodule, no new lesion to suggest progression of disease at this time    CAT scan in 2025 showed stable 2.5 cm lingular mass, stable appearance of multiple lung nodules no new lung nodules however, postsurgical changes of the right upper pole of the kidney with scar    Will follow-up in 6 months with CAT scan, CBC, CMP           No follow-ups on file.    History of Present Illness   Chief Complaint   Patient presents with    Follow-up   79-year-old  male with history of multiple skin cancers, history of melanoma when he was golfing and he had pain in the left thigh area, with subsequent limbing     Bone scan  on 06/2018 showed abnormal increased uptake within the left femoral neck, inter trochanteric area and proximal day of feces, MRI showed abnormal marrow signal within the left femoral neck, neck, intertrochanteric region with extension to the proximal diaphoresis with mild bone expansion and endosteal scalloping, enlarged prostate measuring 4.8 x 3.6 cm fullness of the right kidney, right kidney biopsy showed high-grade renal cell carcinoma, clear cell subtype with possibility of sarcomatoid variant, positive for vimentin, CD10, comp 5.2, negative for PAX8, melan a, S100 and TTF1     Status post embolization to the tumor of the left hip in August 2018, and left total hip arthroplasty with pathological fracture, metastatic high-grade and sarcomatoid carcinoma consistent with grade 4 renal cell carcinoma, workup showed invading of the right adrenal gland, lung, bone involvement avid on PET scan     Status post 4 cycles of ipilimumab/nivolumab as of 11/01/2018 followed by maintenance nivolumab 480 mg every 4 weeks status post 15 fraction of radiation therapy to the left area     Regarding history of melanoma on his face requiring sentinel lymph node biopsy, history of 2 areas of melanoma in the back and the leg     He had been receiving Xgeva as of 11/07/2019 on monthly basis in [place] under Dr. Adali Braden     PET scan on 05/23/2022 showed necrotic left upper lobe lung mass measuring 2.5 x 2.4 cm with significant decrease from the initial PET scan with mild uptake nodule in the right lower lobe of the lung measuring 1.1 x 0.7 cm unchanged, nodule within the superior segment of the right lower lobe of the lung measuring 0.9 x 0.7 cm, mild uptake in the inferior pole of the right kidney measuring 1.7 x 1.6 cm, mild mesenteric, retroperitoneal, iliac, inguinal lymph node right adrenal nodule measuring 1.0 x 0.7 cm, left adrenal nodule measuring 2.2 x 1.6 cm and stable uptake in the left femoral head into the neck.     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 requiring discontinuation of nivolumab, CT scan however showed decrease in adrenal metastases        Evaluated by radiation oncology at this time no need for radiation     CAT scan in May 2023 showed stable disease no new lesions     CAT scan in September 2023 showed stable disease no new lesions  Oncology History   Cancer Staging   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 (HCC)  Staging form: Kidney, AJCC 8th Edition  - Clinical: Stage IV (cT3b, cNX, pM1) - Signed by Nikkie Verma MD on 2/24/2023  Histologic grade (G): G3  Histologic grading system: 4 grade system  - Pathologic: Stage IV (pM1) - Signed by Nikkie Verma MD on 5/22/2023  Histologic grade (G): G3  Histologic grading system: 4 grade system  Oncology History   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 (HCC)   7/8/2022 Initial Diagnosis    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 (HCC)     8/8/2022 - 8/8/2022 Chemotherapy    nivolumab (OPDIVO) IVPB, 480 mg, Intravenous, Once, 0 of 6 cycles      Chemotherapy    nivolumab (OPDIVO) IVPB, 480 mg, Intravenous, Once, 0 of 6 cycles       2/24/2023 -  Cancer Staged    Staging form: Kidney, AJCC 8th Edition  - Clinical: Stage IV (cT3b, cNX, pM1) - Signed by Nikkie Verma MD on 2/24/2023  Histologic grade (G): G3  Histologic grading system: 4 grade system       5/22/2023 -  Cancer Staged    Staging form: Kidney, AJCC 8th Edition  - Pathologic: Stage IV (pM1) - Signed by Nikkie Verma MD on 5/22/2023  Histologic grade (G): G3  Histologic grading system: 4 grade system               Review of Systems   Constitutional:  Negative for chills and fever.   HENT:  Negative for ear pain and sore throat.    Eyes:  Negative for pain and visual disturbance.   Respiratory:  Negative for cough and shortness of  "breath.    Cardiovascular:  Negative for chest pain and palpitations.   Gastrointestinal:  Negative for abdominal pain and vomiting.   Genitourinary:  Negative for dysuria and hematuria.   Musculoskeletal:  Negative for arthralgias and back pain.   Skin:  Negative for color change and rash.   Neurological:  Negative for seizures and syncope.   All other systems reviewed and are negative.          Objective   /82 (BP Location: Left arm, Patient Position: Sitting, Cuff Size: Adult)   Pulse 66   Temp 98.2 °F (36.8 °C) (Temporal)   Resp 16   Ht 5' 8\" (1.727 m)   Wt 78.9 kg (174 lb)   SpO2 99%   BMI 26.46 kg/m²     Pain Screening:  Pain Score: 0-No pain  ECOG   0  Physical Exam  Vitals reviewed.   Constitutional:       General: He is not in acute distress.     Appearance: He is well-developed. He is not diaphoretic.   HENT:      Head: Normocephalic and atraumatic.   Eyes:      Conjunctiva/sclera: Conjunctivae normal.   Neck:      Trachea: No tracheal deviation.   Cardiovascular:      Rate and Rhythm: Normal rate and regular rhythm.      Heart sounds: No murmur heard.     No friction rub. No gallop.   Pulmonary:      Effort: Pulmonary effort is normal. No respiratory distress.      Breath sounds: No decreased air movement. No decreased breath sounds, wheezing or rales.   Chest:      Chest wall: No tenderness.   Abdominal:      General: There is no distension.      Palpations: Abdomen is soft. There is no hepatomegaly or splenomegaly.      Tenderness: There is no abdominal tenderness.   Musculoskeletal:      Cervical back: Normal range of motion and neck supple.      Right lower leg: No edema.      Left lower leg: No edema.   Lymphadenopathy:      Head:      Right side of head: No submental or submandibular adenopathy.      Left side of head: No submental or submandibular adenopathy.      Cervical: No cervical adenopathy.      Upper Body:      Right upper body: No supraclavicular or axillary adenopathy.      " Left upper body: No supraclavicular or axillary adenopathy.      Lower Body: No right inguinal adenopathy. No left inguinal adenopathy.   Skin:     General: Skin is warm and dry.      Coloration: Skin is not pale.      Findings: No erythema or rash.   Neurological:      General: No focal deficit present.      Mental Status: He is alert and oriented to person, place, and time.   Psychiatric:         Behavior: Behavior normal.         Thought Content: Thought content normal.         Judgment: Judgment normal.         Labs: I have reviewed the following labs:  Lab Results   Component Value Date/Time    WBC 6.67 02/26/2025 09:36 AM    RBC 4.33 02/26/2025 09:36 AM    Hemoglobin 14.3 02/26/2025 09:36 AM    Hematocrit 42.4 02/26/2025 09:36 AM    MCV 98 02/26/2025 09:36 AM    MCH 33.0 02/26/2025 09:36 AM    RDW 12.7 02/26/2025 09:36 AM    Platelets 276 02/26/2025 09:36 AM    Segmented % 70 02/26/2025 09:36 AM    Lymphocytes % 19 02/26/2025 09:36 AM    Monocytes % 9 02/26/2025 09:36 AM    Eosinophils Relative 1 02/26/2025 09:36 AM    Basophils Relative 1 02/26/2025 09:36 AM    Immature Grans % 0 02/26/2025 09:36 AM    Absolute Neutrophils 4.61 02/26/2025 09:36 AM     Lab Results   Component Value Date/Time    Potassium 5.0 02/26/2025 09:36 AM    Chloride 105 02/26/2025 09:36 AM    CO2 28 02/26/2025 09:36 AM    BUN 15 02/26/2025 09:36 AM    Creatinine 1.21 02/26/2025 09:36 AM    Glucose, Fasting 89 08/29/2024 09:01 AM    Calcium 9.5 02/26/2025 09:36 AM    AST 16 02/26/2025 09:36 AM    ALT 17 02/26/2025 09:36 AM    Alkaline Phosphatase 52 02/26/2025 09:36 AM    Total Protein 6.8 02/26/2025 09:36 AM    Albumin 4.1 02/26/2025 09:36 AM    Total Bilirubin 0.67 02/26/2025 09:36 AM    eGFR 55 02/26/2025 09:36 AM

## 2025-03-06 NOTE — ASSESSMENT & PLAN NOTE
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 Status post ORIF followed by radiation therapy. He received 4 cycles of ipilimumab/nivolumab as of 11/01/2018 at [facility] in [place], [place]     He had good response with subsequent continuation of nivolumab 480 mg every month   PET scan on 05/23/2022 showed favorable response involving the lungs, retroperitoneal, bilateral adrenal glands primary in the right kidney     8/2022 Nivolumab was discontinued due to colitis.  Xgeva Discontinued.     CAT scan in February 2023 showed stable left upper lobe lung nodule, bilateral small stable lung nodules     CAT scan in May 2023 showed stable disease no new lesions     CAT scan in September 2023 showed stable disease no new lesions as well     Repeat CAT scan in January 2024 showed stable left upper lobe lung nodule, no new lesion to suggest progression of disease at this time    CAT scan in February 2025 showed stable 2.5 cm lingular mass, stable appearance of multiple lung nodules no new lung nodules however, postsurgical changes of the right upper pole of the kidney with scar    Will follow-up in 6 months with CAT scan, CBC, CMP

## 2025-03-09 DIAGNOSIS — I10 PRIMARY HYPERTENSION: ICD-10-CM

## 2025-03-11 RX ORDER — LISINOPRIL 20 MG/1
20 TABLET ORAL 2 TIMES DAILY
Qty: 180 TABLET | Refills: 1 | Status: SHIPPED | OUTPATIENT
Start: 2025-03-11

## 2025-03-31 DIAGNOSIS — I10 PRIMARY HYPERTENSION: ICD-10-CM

## 2025-03-31 DIAGNOSIS — I15.9 SECONDARY HYPERTENSION: ICD-10-CM

## 2025-04-07 RX ORDER — AMLODIPINE BESYLATE 2.5 MG/1
2.5 TABLET ORAL DAILY
Qty: 90 TABLET | Refills: 1 | Status: SHIPPED | OUTPATIENT
Start: 2025-04-07

## 2025-08-07 ENCOUNTER — TELEPHONE (OUTPATIENT)
Dept: FAMILY MEDICINE CLINIC | Facility: CLINIC | Age: 80
End: 2025-08-07